# Patient Record
Sex: FEMALE | Race: WHITE | NOT HISPANIC OR LATINO | Employment: OTHER | URBAN - METROPOLITAN AREA
[De-identification: names, ages, dates, MRNs, and addresses within clinical notes are randomized per-mention and may not be internally consistent; named-entity substitution may affect disease eponyms.]

---

## 2017-01-17 ENCOUNTER — NON-PROVIDER VISIT (OUTPATIENT)
Dept: CARDIOLOGY | Facility: MEDICAL CENTER | Age: 67
End: 2017-01-17
Payer: MEDICARE

## 2017-01-17 ENCOUNTER — OFFICE VISIT (OUTPATIENT)
Dept: CARDIOLOGY | Facility: MEDICAL CENTER | Age: 67
End: 2017-01-17
Payer: MEDICARE

## 2017-01-17 VITALS
BODY MASS INDEX: 23.99 KG/M2 | SYSTOLIC BLOOD PRESSURE: 100 MMHG | OXYGEN SATURATION: 93 % | WEIGHT: 144 LBS | DIASTOLIC BLOOD PRESSURE: 60 MMHG | HEART RATE: 62 BPM | HEIGHT: 65 IN

## 2017-01-17 DIAGNOSIS — I48.0 PAF (PAROXYSMAL ATRIAL FIBRILLATION) (HCC): ICD-10-CM

## 2017-01-17 DIAGNOSIS — Z95.0 CARDIAC PACEMAKER IN SITU: ICD-10-CM

## 2017-01-17 DIAGNOSIS — E78.2 MIXED HYPERLIPIDEMIA: ICD-10-CM

## 2017-01-17 DIAGNOSIS — I49.5 SSS (SICK SINUS SYNDROME) (HCC): ICD-10-CM

## 2017-01-17 DIAGNOSIS — G47.00 INSOMNIA, UNSPECIFIED TYPE: ICD-10-CM

## 2017-01-17 PROCEDURE — 93288 INTERROG EVL PM/LDLS PM IP: CPT | Performed by: NURSE PRACTITIONER

## 2017-01-17 PROCEDURE — 99214 OFFICE O/P EST MOD 30 MIN: CPT | Mod: 25 | Performed by: NURSE PRACTITIONER

## 2017-01-17 ASSESSMENT — ENCOUNTER SYMPTOMS
HEADACHES: 0
ABDOMINAL PAIN: 0
MYALGIAS: 0
SHORTNESS OF BREATH: 0
FEVER: 0
LOSS OF CONSCIOUSNESS: 0
PALPITATIONS: 0
CHILLS: 0
DIZZINESS: 0
PND: 0
BRUISES/BLEEDS EASILY: 0
ORTHOPNEA: 0

## 2017-01-17 NOTE — PROGRESS NOTES
Subjective:   Simona Jensen is a 66 y.o. female who presents today for six month FU for PM check and hyperlipidemia.    Simona is a 66 year old female with history of sick sinus syndrome with pacemaker since June 2010, paroxysmal atrial fibrillation on Flecainide, and hyperlipidemia, normally followed by Dr. Malagon.    Since the last visit six months ago, she has been doing well. She joined [employer], and has lost 25+ pounds, and feel much better. No chest pain, pressure or discomfort; no symptomatic palpitations; no shortness of breath, orthopnea or PND; no dizziness or syncope; no edema. BP has been very stable.    Past Medical History   Diagnosis Date   • A-fib (CMS-HCC)    • SSS (sick sinus syndrome) (CMS-HCC)    • Hyperlipidemia    • Menopause    • Pacemaker June 2010   • Other specified disorder of intestines      Divertuculosis     Past Surgical History   Procedure Laterality Date   • Hysterectomy, total abdominal     • Pacemaker insertion  June 2010     [employer] Altrua 60 [id number] implanted by Dr. Marte, [facility].   • Low anterior resection robotic  5/28/2013     Performed by Prashant Almanzar M.D. at SURGERY [facility]   • Breast biopsy  4/28/2014     Performed by Alex Cruz M.D. at SURGERY SAME DAY [facility] ORS     Family History   Problem Relation Age of Onset   • Arrythmia Mother    • Diabetes Mother    • Cancer Mother      breast, lung   • Hypertension Mother    • Hyperlipidemia Mother    • Arrythmia Brother    • Diabetes Brother    • Hypertension Brother    • Cancer Brother      Prsotate CA   • Heart Disease Father    • Cancer Maternal Aunt      breast     History   Smoking status   • Never Smoker    Smokeless tobacco   • Never Used     Allergies   Allergen Reactions   • Nkda [No Known Drug Allergy]      Outpatient Encounter Prescriptions as of 1/17/2017   Medication Sig Dispense Refill   • flecainide (TAMBOCOR) 100 MG Tab Take 1 Tab by mouth 2 times a day. 180  "Tab 3   • simvastatin (ZOCOR) 40 MG Tab Take 1 tablet by mouth  every evening 90 Tab 1   • trazodone (DESYREL) 100 MG Tab Take 1 tablet by mouth  every night at bedtime 90 Tab 1   • aspirin EC (ECOTRIN) 81 MG TBEC Take 81 mg by mouth every day.     • Calcium 500 MG CHEW Take 2 Each by mouth every day.     • MULTIPLE VITAMINS PO Take 1 Tab by mouth every day.     • simvastatin (ZOCOR) 40 MG Tab Take 1 tablet by mouth  every evening 90 Tab 0   • trazodone (DESYREL) 100 MG Tab Take 1 tablet by mouth  every night at bedtime 90 Tab 0     No facility-administered encounter medications on file as of 1/17/2017.     Review of Systems   Constitutional: Negative for fever and chills.   Respiratory: Negative for shortness of breath.    Cardiovascular: Negative for chest pain, palpitations, orthopnea, leg swelling and PND.   Gastrointestinal: Negative for abdominal pain.   Musculoskeletal: Negative for myalgias.   Neurological: Negative for dizziness, loss of consciousness and headaches.   Endo/Heme/Allergies: Does not bruise/bleed easily.        Objective:   /60 mmHg  Pulse 62  Ht 1.651 m (5' 5\")  Wt 65.318 kg (144 lb)  BMI 23.96 kg/m2  SpO2 93%    Physical Exam   Constitutional: She is oriented to person, place, and time. She appears well-developed and well-nourished. No distress.   Weight is down 20 pounds since May 2016.   HENT:   Head: Normocephalic.   Eyes: Conjunctivae and EOM are normal.   Neck: Normal range of motion. Neck supple. No JVD present.   Cardiovascular: Normal rate, regular rhythm, normal heart sounds and intact distal pulses.  Exam reveals no gallop and no friction rub.    No murmur heard.  Pulmonary/Chest: Effort normal and breath sounds normal. No respiratory distress.   PM in left chest wall.   Abdominal: Soft. Bowel sounds are normal.   Musculoskeletal: Normal range of motion. She exhibits no edema.   Neurological: She is alert and oriented to person, place, and time.   Skin: Skin is warm and " dry.   Psychiatric: She has a normal mood and affect. Her behavior is normal.     PM is working normally. No mode switching episodes at all.    Labs as of 8/31/2016 - reviewed with patient:  Vitamin D 60  Glucose 95  BUN 24  Creatinine 0.97  AST 17  ALT 14  GFR 62  Cholesterol 157  Triglycerides 117  HDL 61  LDL 73  Cholesterol/HDL ratio 2.57    Assessment:     1. Cardiac pacemaker in situ     2. SSS (sick sinus syndrome) (CMS-[facility])     3. PAF (paroxysmal atrial fibrillation) (CMS-[facility])     4. Mixed hyperlipidemia     5. Insomnia, unspecified type         Medical Decision Making:  Today's Assessment / Status / Plan:     1. Sick sinus syndrome with paroxysmal atrial fibrillation, with pacemaker, and on Flecainide. She has not had any mode switching, and PM is working normally. No changes are made today.    2. Hyperlipidemia, treated with Zocor. Recent lipids were excellent.    3. Insomnia, treated with Trazadone, stable.    She is doing very well. She is congratulated on her weight loss; continue with exercise.  Same medications. FU in 6 months for next PM check; FU sooner if clinical condition changes.    Collaborating MD: Steven

## 2017-01-17 NOTE — Clinical Note
[facility]   [address]  [place] [address]  Phone: [phone]  Fax: [phone]              Simona Jensen  1950    Encounter Date: 1/17/2017    JUSTIN Matthews          PROGRESS NOTE:  Subjective:   Simona Jensen is a 66 y.o. female who presents today for six month FU for PM check and hyperlipidemia.    Simona is a 66 year old female with history of sick sinus syndrome with pacemaker since June 2010, paroxysmal atrial fibrillation on Flecainide, and hyperlipidemia, normally followed by Dr. Malagon.    Since the last visit six months ago, she has been doing well. She joined [employer], and has lost 25+ pounds, and feel much better. No chest pain, pressure or discomfort; no symptomatic palpitations; no shortness of breath, orthopnea or PND; no dizziness or syncope; no edema. BP has been very stable.    Past Medical History   Diagnosis Date   • A-fib (CMS-HCC)    • SSS (sick sinus syndrome) (CMS-HCC)    • Hyperlipidemia    • Menopause    • Pacemaker June 2010   • Other specified disorder of intestines      Divertuculosis     Past Surgical History   Procedure Laterality Date   • Hysterectomy, total abdominal     • Pacemaker insertion  June 2010     [employer] Altrua 60 [id number] implanted by Dr. Marte, [facility].   • Low anterior resection robotic  5/28/2013     Performed by Prashant Almanzar M.D. at SURGERY [facility]   • Breast biopsy  4/28/2014     Performed by Alex Cruz M.D. at SURGERY SAME DAY [facility] ORS     Family History   Problem Relation Age of Onset   • Arrythmia Mother    • Diabetes Mother    • Cancer Mother      breast, lung   • Hypertension Mother    • Hyperlipidemia Mother    • Arrythmia Brother    • Diabetes Brother    • Hypertension Brother    • Cancer Brother      Prsotate CA   • Heart Disease Father    • Cancer Maternal Aunt      breast     History   Smoking status   "  • Never Smoker    Smokeless tobacco   • Never Used     Allergies   Allergen Reactions   • Nkda [No Known Drug Allergy]      Outpatient Encounter Prescriptions as of 1/17/2017   Medication Sig Dispense Refill   • flecainide (TAMBOCOR) 100 MG Tab Take 1 Tab by mouth 2 times a day. 180 Tab 3   • simvastatin (ZOCOR) 40 MG Tab Take 1 tablet by mouth  every evening 90 Tab 1   • trazodone (DESYREL) 100 MG Tab Take 1 tablet by mouth  every night at bedtime 90 Tab 1   • aspirin EC (ECOTRIN) 81 MG TBEC Take 81 mg by mouth every day.     • Calcium 500 MG CHEW Take 2 Each by mouth every day.     • MULTIPLE VITAMINS PO Take 1 Tab by mouth every day.     • simvastatin (ZOCOR) 40 MG Tab Take 1 tablet by mouth  every evening 90 Tab 0   • trazodone (DESYREL) 100 MG Tab Take 1 tablet by mouth  every night at bedtime 90 Tab 0     No facility-administered encounter medications on file as of 1/17/2017.     Review of Systems   Constitutional: Negative for fever and chills.   Respiratory: Negative for shortness of breath.    Cardiovascular: Negative for chest pain, palpitations, orthopnea, leg swelling and PND.   Gastrointestinal: Negative for abdominal pain.   Musculoskeletal: Negative for myalgias.   Neurological: Negative for dizziness, loss of consciousness and headaches.   Endo/Heme/Allergies: Does not bruise/bleed easily.        Objective:   /60 mmHg  Pulse 62  Ht 1.651 m (5' 5\")  Wt 65.318 kg (144 lb)  BMI 23.96 kg/m2  SpO2 93%    Physical Exam   Constitutional: She is oriented to person, place, and time. She appears well-developed and well-nourished. No distress.   Weight is down 20 pounds since May 2016.   HENT:   Head: Normocephalic.   Eyes: Conjunctivae and EOM are normal.   Neck: Normal range of motion. Neck supple. No JVD present.   Cardiovascular: Normal rate, regular rhythm, normal heart sounds and intact distal pulses.  Exam reveals no gallop and no friction rub.    No murmur heard.  Pulmonary/Chest: Effort " normal and breath sounds normal. No respiratory distress.   PM in left chest wall.   Abdominal: Soft. Bowel sounds are normal.   Musculoskeletal: Normal range of motion. She exhibits no edema.   Neurological: She is alert and oriented to person, place, and time.   Skin: Skin is warm and dry.   Psychiatric: She has a normal mood and affect. Her behavior is normal.     PM is working normally. No mode switching episodes at all.    Labs as of 8/31/2016 - reviewed with patient:  Vitamin D 60  Glucose 95  BUN 24  Creatinine 0.97  AST 17  ALT 14  GFR 62  Cholesterol 157  Triglycerides 117  HDL 61  LDL 73  Cholesterol/HDL ratio 2.57    Assessment:     1. Cardiac pacemaker in situ     2. SSS (sick sinus syndrome) ([facility])     3. PAF (paroxysmal atrial fibrillation) ([facility])     4. Mixed hyperlipidemia     5. Insomnia, unspecified type         Medical Decision Making:  Today's Assessment / Status / Plan:     1. Sick sinus syndrome with paroxysmal atrial fibrillation, with pacemaker, and on Flecainide. She has not had any mode switching, and PM is working normally. No changes are made today.    2. Hyperlipidemia, treated with Zocor. Recent lipids were excellent.    3. Insomnia, treated with Trazadone, stable.    She is doing very well. She is congratulated on her weight loss; continue with exercise.  Same medications. FU in 6 months for next PM check; FU sooner if clinical condition changes.    Collaborating MD: Steven Love

## 2017-01-17 NOTE — MR AVS SNAPSHOT
"        Simona Jensen   2017 1:45 PM   Office Visit   MRN: [id number]    Department:  [facility]   Dept Phone:  [phone]    Description:  Female : 1950   Provider:  WALE MatthewsPSG           Reason for Visit     Follow-Up SICK SINUS SYNDROME    Pacemaker Check/Dysfunction [employer]      Allergies as of 2017     Allergen Noted Reactions    Nkda [No Known Drug Allergy] 2012         Vital Signs     Blood Pressure Pulse Height Weight Body Mass Index Oxygen Saturation    100/60 mmHg 62 1.651 m (5' 5\") 65.318 kg (144 lb) 23.96 kg/m2 93%    Smoking Status                   Never Smoker            Basic Information     Date Of Birth Sex Race Ethnicity Preferred Language    1950 Female White Non- English      Your appointments     2017 12:40 PM   PACER CHECK ONLY with Veronica Britton A.P.N.   [facility] (--)    [address]  [facility] [id number] [phone]            2017  1:00 PM   FOLLOW UP with Jonathan Malagon M.D.   [facility] (--)    [address]  [facility] [id number] [phone]              Problem List              ICD-10-CM Priority Class Noted - Resolved    Cardiac pacemaker in situ Z95.0   2012 - Present    SSS (sick sinus syndrome) (CMS-HCC) I49.5   2012 - Present    Diverticulitis K57.92   2013 - Present    PAF (paroxysmal atrial fibrillation) (CMS-HCC) I48.0   2013 - Present    Hyperlipidemia E78.5   2014 - Present    Insomnia G47.00   2014 - Present    Other (abnormal) findings on radiological examination of breast R92.8   2014 - Present    Vitamin D insufficiency E55.9   2015 - Present    Osteopenia M85.80   2015 - Present    Family history of breast cancer in mother Z80.3   2015 - Present    Hypercalcemia E83.52   2016 - Present   "   Health Maintenance        Date Due Completion Dates    IMM DTaP/Tdap/Td Vaccine (1 - Tdap) 9/19/1970 ---    IMM INFLUENZA (1) 9/1/2016 10/28/2015    IMM PNEUMOCOCCAL 65+ (ADULT) LOW/MEDIUM RISK SERIES (1 of 2 - PCV13) 9/19/2016 ---    MAMMOGRAM 12/28/2017 12/28/2016, 12/8/2015, 12/4/2014, 4/28/2014, 4/28/2014, 12/16/2013, 12/10/2013, 12/10/2013, 12/10/2013, 12/9/2013, 12/15/2012 (Prv Comp)    Override on 12/15/2012: Previously completed    COLONOSCOPY 10/15/2019 10/15/2009 (Prv Comp)    Override on 10/15/2009: Previously completed    BONE DENSITY 11/9/2021 11/9/2016            Current Immunizations     Influenza TIV (IM) 10/28/2015    SHINGLES VACCINE 10/15/2012      Below and/or attached are the medications your provider expects you to take. Review all of your home medications and newly ordered medications with your provider and/or pharmacist. Follow medication instructions as directed by your provider and/or pharmacist. Please keep your medication list with you and share with your provider. Update the information when medications are discontinued, doses are changed, or new medications (including over-the-counter products) are added; and carry medication information at all times in the event of emergency situations     Allergies:  NKDA - (reactions not documented)               Medications  Valid as of: January 17, 2017 -  1:46 PM    Generic Name Brand Name Tablet Size Instructions for use    Aspirin (Tablet Delayed Response) ECOTRIN 81 MG Take 81 mg by mouth every day.        Calcium Carbonate (Chew Tab) Calcium 500 MG Take 2 Each by mouth every day.        Flecainide Acetate (Tab) TAMBOCOR 100 MG Take 1 Tab by mouth 2 times a day.        Multiple Vitamin   Take 1 Tab by mouth every day.        Simvastatin (Tab) ZOCOR 40 MG Take 1 tablet by mouth  every evening        Simvastatin (Tab) ZOCOR 40 MG Take 1 tablet by mouth  every evening        TraZODone HCl (Tab) DESYREL 100 MG Take 1 tablet by mouth  every night  at bedtime        TraZODone HCl (Tab) DESYREL 100 MG Take 1 tablet by mouth  every night at bedtime        .                 Medicines prescribed today were sent to:     [facility] (), [place] - [address]    [address] () [place] [address]    Phone: [phone] Fax: [phone]    Open 24 Hours?: No    [facility] - [place], [place] - [address] [facility]    [address] [facility] [address]    Phone: [phone] Fax: [phone]    Open 24 Hours?: No    [facility] - [place] [id number] [facility] [address] [facility] [address]    Phone: [phone] Fax: [phone]    Open 24 Hours?: No      Medication refill instructions:       If your prescription bottle indicates you have medication refills left, it is not necessary to call your provider’s office. Please contact your pharmacy and they will refill your medication.    If your prescription bottle indicates you do not have any refills left, you may request refills at any time through one of the following ways: The online [employer] system (except Urgent Care), by calling your provider’s office, or by asking your pharmacy to contact your provider’s office with a refill request. Medication refills are processed only during regular business hours and may not be available until the next business day. Your provider may request additional information or to have a follow-up visit with you prior to refilling your medication.   *Please Note: Medication refills are assigned a new Rx number when refilled electronically. Your pharmacy may indicate that no refills were authorized even though a new prescription for the same medication is available at the pharmacy. Please request the medicine by name with the pharmacy before contacting your provider for a refill.           [employer] Access Code: Activation code not generated  Current [employer] Status: Active

## 2017-05-02 ENCOUNTER — TELEPHONE (OUTPATIENT)
Dept: MEDICAL GROUP | Facility: PHYSICIAN GROUP | Age: 67
End: 2017-05-02

## 2017-05-02 NOTE — TELEPHONE ENCOUNTER
NEW PATIENT VISIT PRE-VISIT PLANNING    1.  [employer] Patient is checked in Patient Demographics? YES    2.  Immunizations were updated in Epic using WebIZ?: Epic matches WebIZ       •  Web Iz Recommendations: HEPATITIS A  HEPATITIS B TDAP    3.  Patient is due for the following Health Maintenance Topics:   Health Maintenance Due   Topic Date Due   • Annual Wellness Visit  1950   • IMM DTaP/Tdap/Td Vaccine (1 - Tdap) 09/19/1970       - Patient declines Immunizations: TDAP     4.  Reviewed/Updated the following with patient:       •   Preferred Pharmacy? YES       •   Preferred Lab? YES       •   Medications? YES. Was Abstract Encounter opened and chart updated? YES       •   Social History? YES. Was Abstract Encounter opened and chart updated? YES       •   Family History? YES. Was Abstract Encounter opened and chart updated? YES    5.  Updated Care Team?       •   DME Company (gait device, O2, CPAP, etc.) NO       •   Other Specialists (eye doctor, derm, GYN, cardiology, endo, etc): NO    6.  Patient was informed to arrive 15 min prior to their scheduled appointment and bring in their medication bottles? YES

## 2017-05-03 ENCOUNTER — OFFICE VISIT (OUTPATIENT)
Dept: MEDICAL GROUP | Facility: PHYSICIAN GROUP | Age: 67
End: 2017-05-03
Payer: MEDICARE

## 2017-05-03 ENCOUNTER — HOSPITAL ENCOUNTER (OUTPATIENT)
Dept: LAB | Facility: MEDICAL CENTER | Age: 67
End: 2017-05-03
Attending: INTERNAL MEDICINE
Payer: MEDICARE

## 2017-05-03 VITALS
HEART RATE: 63 BPM | BODY MASS INDEX: 23.74 KG/M2 | OXYGEN SATURATION: 94 % | HEIGHT: 66 IN | TEMPERATURE: 98.8 F | SYSTOLIC BLOOD PRESSURE: 100 MMHG | WEIGHT: 147.71 LBS | DIASTOLIC BLOOD PRESSURE: 80 MMHG

## 2017-05-03 DIAGNOSIS — G47.00 INSOMNIA, UNSPECIFIED TYPE: ICD-10-CM

## 2017-05-03 DIAGNOSIS — I48.0 PAF (PAROXYSMAL ATRIAL FIBRILLATION) (HCC): ICD-10-CM

## 2017-05-03 DIAGNOSIS — E55.9 VITAMIN D INSUFFICIENCY: ICD-10-CM

## 2017-05-03 DIAGNOSIS — E78.2 MIXED HYPERLIPIDEMIA: ICD-10-CM

## 2017-05-03 DIAGNOSIS — Z80.3 FAMILY HISTORY OF BREAST CANCER IN MOTHER: ICD-10-CM

## 2017-05-03 DIAGNOSIS — I49.5 SSS (SICK SINUS SYNDROME) (HCC): ICD-10-CM

## 2017-05-03 DIAGNOSIS — K57.30 DIVERTICULOSIS OF LARGE INTESTINE WITHOUT HEMORRHAGE: ICD-10-CM

## 2017-05-03 DIAGNOSIS — M85.859 OSTEOPENIA OF THIGH, UNSPECIFIED LATERALITY: ICD-10-CM

## 2017-05-03 DIAGNOSIS — Z95.0 CARDIAC PACEMAKER IN SITU: ICD-10-CM

## 2017-05-03 LAB
ALBUMIN SERPL BCP-MCNC: 4.5 G/DL (ref 3.2–4.9)
ALBUMIN/GLOB SERPL: 1.4 G/DL
ALP SERPL-CCNC: 72 U/L (ref 30–99)
ALT SERPL-CCNC: 16 U/L (ref 2–50)
ANION GAP SERPL CALC-SCNC: 9 MMOL/L (ref 0–11.9)
AST SERPL-CCNC: 16 U/L (ref 12–45)
BILIRUB SERPL-MCNC: 0.6 MG/DL (ref 0.1–1.5)
BUN SERPL-MCNC: 25 MG/DL (ref 8–22)
CALCIUM SERPL-MCNC: 10.2 MG/DL (ref 8.5–10.5)
CHLORIDE SERPL-SCNC: 104 MMOL/L (ref 96–112)
CHOLEST SERPL-MCNC: 170 MG/DL (ref 100–199)
CO2 SERPL-SCNC: 26 MMOL/L (ref 20–33)
CREAT SERPL-MCNC: 0.94 MG/DL (ref 0.5–1.4)
GFR SERPL CREATININE-BSD FRML MDRD: 60 ML/MIN/1.73 M 2
GLOBULIN SER CALC-MCNC: 3.2 G/DL (ref 1.9–3.5)
GLUCOSE SERPL-MCNC: 84 MG/DL (ref 65–99)
HDLC SERPL-MCNC: 72 MG/DL
LDLC SERPL CALC-MCNC: 73 MG/DL
POTASSIUM SERPL-SCNC: 4 MMOL/L (ref 3.6–5.5)
PROT SERPL-MCNC: 7.7 G/DL (ref 6–8.2)
SODIUM SERPL-SCNC: 139 MMOL/L (ref 135–145)
TRIGL SERPL-MCNC: 126 MG/DL (ref 0–149)

## 2017-05-03 PROCEDURE — 99214 OFFICE O/P EST MOD 30 MIN: CPT | Performed by: INTERNAL MEDICINE

## 2017-05-03 PROCEDURE — G8432 DEP SCR NOT DOC, RNG: HCPCS | Performed by: INTERNAL MEDICINE

## 2017-05-03 PROCEDURE — 4040F PNEUMOC VAC/ADMIN/RCVD: CPT | Performed by: INTERNAL MEDICINE

## 2017-05-03 PROCEDURE — G8420 CALC BMI NORM PARAMETERS: HCPCS | Performed by: INTERNAL MEDICINE

## 2017-05-03 PROCEDURE — 82306 VITAMIN D 25 HYDROXY: CPT

## 2017-05-03 PROCEDURE — 1036F TOBACCO NON-USER: CPT | Performed by: INTERNAL MEDICINE

## 2017-05-03 PROCEDURE — 1101F PT FALLS ASSESS-DOCD LE1/YR: CPT | Performed by: INTERNAL MEDICINE

## 2017-05-03 PROCEDURE — 36415 COLL VENOUS BLD VENIPUNCTURE: CPT

## 2017-05-03 PROCEDURE — 3014F SCREEN MAMMO DOC REV: CPT | Performed by: INTERNAL MEDICINE

## 2017-05-03 PROCEDURE — 80061 LIPID PANEL: CPT

## 2017-05-03 PROCEDURE — 80053 COMPREHEN METABOLIC PANEL: CPT

## 2017-05-03 ASSESSMENT — PATIENT HEALTH QUESTIONNAIRE - PHQ9: CLINICAL INTERPRETATION OF PHQ2 SCORE: 0

## 2017-05-03 NOTE — ASSESSMENT & PLAN NOTE
History of low calcium and vitamin D. She is on supplement 1000 U per day. Last vitamin D 60 (09/2016) she denies any bone pain, tremor,

## 2017-05-03 NOTE — PROGRESS NOTES
Subjective:   iSmona Jensen is a 66 y.o. female here today for establish, medication refills, hyperlipidemia    PAF (paroxysmal atrial fibrillation)  She had history of a-fib since 2010. She was placed on flecanaide since then. She sees Dr. Call who is close monitoring her. At that time she noted feeling fatgued, HR 40. She was dx with SSS and pacemaker was placed. She is still taking flecainide. He denies chest pain, leg swelling, palpitation, shortness of breath.    Cardiac pacemaker in situ  Follows with cardiology every 6 months    Diverticulosis of large intestine without hemorrhage  History of complicated diverticulitis status post colon ectomy. Stable. Last colonoscopy 2009. She denies any melanotic stool, hematochezia or abdominal pain.    Family history of breast cancer in mother  Mother and aunt with breast cancer. She has had left breast biopsy 2014 , benign microcalcification.      Hyperlipidemia  Chronic, well managed. LDL < 100. She is on simvastatin 40 mg daily     Insomnia  Chronic. She has trouble falling and staying in sleep. Trazodone helps. She takes it every night. She denies apneic episodes     SSS (sick sinus syndrome)  As per above     Vitamin D insufficiency  History of low calcium and vitamin D. She is on supplement 1000 U per day. Last vitamin D 60 (09/2016) she denies any bone pain, tremor,         Current medicines (including changes today)  Current Outpatient Prescriptions   Medication Sig Dispense Refill   • Cholecalciferol (VITAMIN D-3 PO) Take  by mouth.     • Probiotic Product (PROBIOTIC DAILY PO) Take  by mouth.     • flecainide (TAMBOCOR) 100 MG Tab Take 1 Tab by mouth 2 times a day. 180 Tab 3   • simvastatin (ZOCOR) 40 MG Tab Take 1 tablet by mouth  every evening 90 Tab 1   • trazodone (DESYREL) 100 MG Tab Take 1 tablet by mouth  every night at bedtime 90 Tab 1   • aspirin EC (ECOTRIN) 81 MG TBEC Take 81 mg by mouth every day.     • MULTIPLE VITAMINS PO Take 1 Tab by  mouth every day.       No current facility-administered medications for this visit.     She  has a past medical history of A-fib (CMS-HCC); SSS (sick sinus syndrome) (CMS-HCC); Hyperlipidemia; Menopause; Pacemaker (June 2010); Other specified disorder of intestines; and Cataract. She also has no past medical history of Breast cancer (CMS-HCC).    Current Outpatient Prescriptions   Medication Sig Dispense Refill   • Cholecalciferol (VITAMIN D-3 PO) Take  by mouth.     • Probiotic Product (PROBIOTIC DAILY PO) Take  by mouth.     • flecainide (TAMBOCOR) 100 MG Tab Take 1 Tab by mouth 2 times a day. 180 Tab 3   • simvastatin (ZOCOR) 40 MG Tab Take 1 tablet by mouth  every evening 90 Tab 1   • trazodone (DESYREL) 100 MG Tab Take 1 tablet by mouth  every night at bedtime 90 Tab 1   • aspirin EC (ECOTRIN) 81 MG TBEC Take 81 mg by mouth every day.     • MULTIPLE VITAMINS PO Take 1 Tab by mouth every day.       No current facility-administered medications for this visit.       Allergies as of 05/03/2017 - Graham as Reviewed 05/03/2017   Allergen Reaction Noted   • Nkda [no known drug allergy]  07/20/2012       Social History     Social History   • Marital Status:      Spouse Name: N/A   • Number of Children: N/A   • Years of Education: N/A     Occupational History   • Not on file.     Social History Main Topics   • Smoking status: Never Smoker    • Smokeless tobacco: Never Used   • Alcohol Use: 0.5 oz/week     1 Glasses of wine per week      Comment: social, 1 per week   • Drug Use: No   • Sexual Activity:     Partners: Male     Other Topics Concern   • Not on file     Social History Narrative        Family History   Problem Relation Age of Onset   • Arrythmia Mother    • Diabetes Mother    • Cancer Mother      breast, lung   • Hypertension Mother    • Hyperlipidemia Mother    • Arrythmia Brother    • Diabetes Brother    • Hypertension Brother    • Cancer Brother      Prsotate CA   • Heart Disease Brother    • Heart  "Disease Father    • Cancer Maternal Aunt      breast       Past Surgical History   Procedure Laterality Date   • Hysterectomy, total abdominal     • Pacemaker insertion  June 2010     [employer] 60 [id number] implanted by Dr. Marte, [facility].   • Low anterior resection robotic  5/28/2013     Performed by Prashant Almanzar M.D. at SURGERY [facility]   • Breast biopsy  4/28/2014     Performed by Alex Cruz M.D. at SURGERY SAME DAY [facility] ORS   • Pacemaker insertion     • Abdominal hysterectomy total       still has ovaries       ROS   No chest pain, no shortness of breath, no abdominal pain       Objective:     Blood pressure 100/80, pulse 63, temperature 37.1 °C (98.8 °F), height 1.676 m (5' 6\"), weight 67 kg (147 lb 11.3 oz), SpO2 94 %. Body mass index is 23.85 kg/(m^2).   Physical Exam:  Constitutional: Alert, no distress.  Skin: Warm, dry, good turgor, no rashes in visible areas.  Eye: Equal, round and reactive, conjunctiva clear, lids normal.  ENMT: Lips without lesions, good dentition, oropharynx clear.  Neck: Trachea midline, no masses, no thyromegaly. No cervical or supraclavicular lymphadenopathy  Respiratory: Unlabored respiratory effort, lungs clear to auscultation, no wheezes, no ronchi.  Cardiovascular: Normal S1, S2, no murmur, no edema.  Abdomen: Soft, non-tender, no masses, no hepatosplenomegaly.  Psych: Alert and oriented x3, normal affect and mood.        Assessment and Plan:   The following treatment plan was discussed    1. SSS (sick sinus syndrome) (CMS-HCC)  2. PAF (paroxysmal atrial fibrillation) (CMS-HCC)  3. Cardiac pacemaker in situ  Stable, chronic. Follow up with Dr. Torres.   - COMP METABOLIC PANEL; Future      4. Diverticulosis of large intestine without hemorrhage  [place], no flares     5. Family history of breast cancer in mother  She is up to date with mammogram     6. Insomnia, unspecified type  Continue trazodone     7. Mixed hyperlipidemia  LDL at " the goal.continue simvastatin   - LIPID PROFILE; Future  - COMP METABOLIC PANEL; Future    8. Osteopenia of thigh, unspecified laterality  Bone densitty 2016, normal, no osteopenia. Repeat DEXA scan 2021     9. Vitamin D insufficiency  Level nl 09/2016. Continue to monitor. Continue supplement   - VITAMIN D,25 HYDROXY; Future    Followup: Return in about 3 months (around 8/3/2017).

## 2017-05-03 NOTE — ASSESSMENT & PLAN NOTE
She had history of a-fib since 2010. She was placed on flecanaide since then. She sees Dr. Call who is close monitoring her. At that time she noted feeling fatgued, HR 40. She was dx with SSS and pacemaker was placed. She is still taking flecainide. He denies chest pain, leg swelling, palpitation, shortness of breath.

## 2017-05-03 NOTE — ASSESSMENT & PLAN NOTE
History of complicated diverticulitis status post colon ectomy. Stable. Last colonoscopy 2009. She denies any melanotic stool, hematochezia or abdominal pain.

## 2017-05-03 NOTE — ASSESSMENT & PLAN NOTE
Chronic. She has trouble falling and staying in sleep. Trazodone helps. She takes it every night. She denies apneic episodes

## 2017-05-03 NOTE — MR AVS SNAPSHOT
"        Simona Jensen   5/3/2017 1:40 PM   Office Visit   MRN: [id number]    Department:  [facility]   Dept Phone:  [phone]    Description:  Female : 1950   Provider:  Juan Sanchez M.D.           Reason for Visit     Medication Refill needs refills    Other est care      Allergies as of 5/3/2017     Allergen Noted Reactions    Nkda [No Known Drug Allergy] 2012         You were diagnosed with     SSS (sick sinus syndrome) (CMS-[facility])   [962110]       PAF (paroxysmal atrial fibrillation) (CMS-[facility])   [333909]       Cardiac pacemaker in situ   [V45.01.ICD-9-CM]       Diverticulosis of large intestine without hemorrhage   [1148241]       Family history of breast cancer in mother   [308187]       Insomnia, unspecified type   [0722215]       Mixed hyperlipidemia   [272.2.ICD-9-CM]       Osteopenia of thigh, unspecified laterality   [2260542]       Vitamin D insufficiency   [926134]         Vital Signs     Blood Pressure Pulse Temperature Height Weight Body Mass Index    100/80 mmHg 63 37.1 °C (98.8 °F) 1.676 m (5' 6\") 67 kg (147 lb 11.3 oz) 23.85 kg/m2    Oxygen Saturation Smoking Status                94% Never Smoker           Basic Information     Date Of Birth Sex Race Ethnicity Preferred Language    1950 Female White Non- English      Your appointments     2017 12:40 PM   PACER CHECK ONLY with WALE MatthewsPSG   [facility] (--)    [address]  [address]  [place] [address] [phone]            2017  1:00 PM   FOLLOW UP with Jonathan Malagon M.D.   [facility] (--)    [address]  [address]  [place] [address] [phone]              Problem List              ICD-10-CM Priority Class Noted - Resolved    Cardiac pacemaker in situ Z95.0   2012 - Present    SSS (sick sinus syndrome) (CMS-[facility]) I49.5   2012 - Present  "    Diverticulosis of large intestine without hemorrhage K57.30   4/11/2013 - Present    PAF (paroxysmal atrial fibrillation) (CMS-HCC) I48.0   8/14/2013 - Present    Hyperlipidemia E78.5   2/25/2014 - Present    Insomnia G47.00   2/25/2014 - Present    Vitamin D insufficiency E55.9   12/8/2015 - Present    Family history of breast cancer in mother Z80.3   12/8/2015 - Present      Health Maintenance        Date Due Completion Dates    IMM PNEUMOCOCCAL 65+ (ADULT) LOW/MEDIUM RISK SERIES (2 of 2 - PPSV23) 11/9/2017 11/9/2016    MAMMOGRAM 12/28/2017 12/28/2016, 12/8/2015, 12/4/2014, 12/16/2013, 12/9/2013, 12/15/2012 (Prv Comp)    Override on 12/15/2012: Previously completed    COLONOSCOPY 10/15/2019 10/15/2009 (Prv Comp)    Override on 10/15/2009: Previously completed    IMM DTaP/Tdap/Td Vaccine (2 - Td) 1/10/2021 1/10/2011    BONE DENSITY 11/9/2021 11/9/2016            Current Immunizations     13-VALENT PCV PREVNAR 11/9/2016    Dtap Vaccine 1/10/2011    Influenza TIV (IM) 10/28/2015    Influenza Vac Subunit Quad Inj (Pf) 10/15/2012    Influenza Vaccine Adult HD 9/11/2016    Influenza Vaccine Quad Inj (Preserved) 10/18/2014, 10/15/2013    SHINGLES VACCINE 10/15/2012      Below and/or attached are the medications your provider expects you to take. Review all of your home medications and newly ordered medications with your provider and/or pharmacist. Follow medication instructions as directed by your provider and/or pharmacist. Please keep your medication list with you and share with your provider. Update the information when medications are discontinued, doses are changed, or new medications (including over-the-counter products) are added; and carry medication information at all times in the event of emergency situations     Allergies:  NKDA - (reactions not documented)               Medications  Valid as of: May 03, 2017 -  2:26 PM    Generic Name Brand Name Tablet Size Instructions for use    Aspirin (Tablet Delayed  Response) ECOTRIN 81 MG Take 81 mg by mouth every day.        Cholecalciferol   Take  by mouth.        Flecainide Acetate (Tab) TAMBOCOR 100 MG Take 1 Tab by mouth 2 times a day.        Multiple Vitamin   Take 1 Tab by mouth every day.        Probiotic Product   Take  by mouth.        Simvastatin (Tab) ZOCOR 40 MG Take 1 tablet by mouth  every evening        TraZODone HCl (Tab) DESYREL 100 MG Take 1 tablet by mouth  every night at bedtime        .                 Medicines prescribed today were sent to:     [facility] - [place], [place] - [address]    [address] [address]    Phone: [phone] Fax: [phone]    Open 24 Hours?: No      Medication refill instructions:       If your prescription bottle indicates you have medication refills left, it is not necessary to call your provider’s office. Please contact your pharmacy and they will refill your medication.    If your prescription bottle indicates you do not have any refills left, you may request refills at any time through one of the following ways: The online [employer] system (except Urgent Care), by calling your provider’s office, or by asking your pharmacy to contact your provider’s office with a refill request. Medication refills are processed only during regular business hours and may not be available until the next business day. Your provider may request additional information or to have a follow-up visit with you prior to refilling your medication.   *Please Note: Medication refills are assigned a new Rx number when refilled electronically. Your pharmacy may indicate that no refills were authorized even though a new prescription for the same medication is available at the pharmacy. Please request the medicine by name with the pharmacy before contacting your provider for a refill.        Your To Do List     Future Labs/Procedures Complete By Expires    COMP METABOLIC PANEL  As directed 5/4/2018    LIPID PROFILE   As directed 5/4/2018    VITAMIN D,25 HYDROXY  As directed 5/4/2018         MyChart Access Code: Activation code not generated  Current [employer] Status: Active

## 2017-05-03 NOTE — ASSESSMENT & PLAN NOTE
Mother and aunt with breast cancer. She has had left breast biopsy 2014 , benign microcalcification.

## 2017-05-04 LAB — 25(OH)D3 SERPL-MCNC: 40 NG/ML (ref 30–100)

## 2017-05-04 RX ORDER — ACETAMINOPHEN 160 MG
TABLET,DISINTEGRATING ORAL
COMMUNITY
End: 2018-12-20 | Stop reason: SDUPTHER

## 2017-05-04 NOTE — PROGRESS NOTES
Quick Note:    Renetta Jarvis,    I just wanted to let you know that that blood sugar, kidney function, liver function, vitamin D are normal.     Best,   Juan Sanchez M.D.  ______

## 2017-05-11 DIAGNOSIS — G47.00 INSOMNIA, UNSPECIFIED TYPE: ICD-10-CM

## 2017-05-11 RX ORDER — SIMVASTATIN 40 MG
TABLET ORAL
Qty: 90 TAB | Refills: 0 | Status: SHIPPED | OUTPATIENT
Start: 2017-05-11 | End: 2017-07-19 | Stop reason: SDUPTHER

## 2017-05-11 RX ORDER — TRAZODONE HYDROCHLORIDE 100 MG/1
TABLET ORAL
Qty: 90 TAB | Refills: 0 | Status: SHIPPED | OUTPATIENT
Start: 2017-05-11 | End: 2017-05-22 | Stop reason: SDUPTHER

## 2017-05-19 ENCOUNTER — PATIENT MESSAGE (OUTPATIENT)
Dept: MEDICAL GROUP | Facility: PHYSICIAN GROUP | Age: 67
End: 2017-05-19

## 2017-05-19 DIAGNOSIS — L98.9 SKIN LESION: ICD-10-CM

## 2017-05-22 DIAGNOSIS — G47.00 INSOMNIA, UNSPECIFIED TYPE: ICD-10-CM

## 2017-05-22 RX ORDER — TRAZODONE HYDROCHLORIDE 100 MG/1
TABLET ORAL
Qty: 90 TAB | Refills: 1 | OUTPATIENT
Start: 2017-05-22

## 2017-05-22 NOTE — TELEPHONE ENCOUNTER
Was the patient seen in the last year in this department? Yes     Does patient have an active prescription for medications requested? No     Received Request Via: Pharmacy     Last visit:5/3/17    Last labs:5/3/17

## 2017-05-23 RX ORDER — TRAZODONE HYDROCHLORIDE 100 MG/1
TABLET ORAL
Qty: 90 TAB | Refills: 0 | Status: SHIPPED | OUTPATIENT
Start: 2017-05-23 | End: 2017-09-15 | Stop reason: SDUPTHER

## 2017-05-31 ENCOUNTER — TELEPHONE (OUTPATIENT)
Dept: MEDICAL GROUP | Facility: PHYSICIAN GROUP | Age: 67
End: 2017-05-31

## 2017-05-31 DIAGNOSIS — L98.9 SKIN LESION: ICD-10-CM

## 2017-05-31 NOTE — TELEPHONE ENCOUNTER
1. Caller Name: Simona Jensen                      Call Back Number: [phone] (home)     2. Message: Pt needs an updated referral to dermatology please advise.     3. Patient approves office to leave a detailed voicemail/MyChart message: N\A

## 2017-05-31 NOTE — TELEPHONE ENCOUNTER
----- Message from Your Healthcare Team sent at 5/31/2017  2:23 PM PDT -----  Regarding: Non-Urgent Medical Question  Contact: [phone]  Renetta Sanchez,    Per your referral, I contacted [facility] and, unfortunately, they do not accept Medicare patients.   I wondered if you had another recommendation.     Thank you,  Simona Moody

## 2017-07-11 ENCOUNTER — OFFICE VISIT (OUTPATIENT)
Dept: CARDIOLOGY | Facility: MEDICAL CENTER | Age: 67
End: 2017-07-11
Payer: MEDICARE

## 2017-07-11 ENCOUNTER — NON-PROVIDER VISIT (OUTPATIENT)
Dept: CARDIOLOGY | Facility: MEDICAL CENTER | Age: 67
End: 2017-07-11
Payer: MEDICARE

## 2017-07-11 VITALS
BODY MASS INDEX: 24.99 KG/M2 | HEIGHT: 65 IN | OXYGEN SATURATION: 92 % | SYSTOLIC BLOOD PRESSURE: 110 MMHG | HEART RATE: 60 BPM | WEIGHT: 150 LBS | DIASTOLIC BLOOD PRESSURE: 70 MMHG

## 2017-07-11 DIAGNOSIS — I49.5 SSS (SICK SINUS SYNDROME) (HCC): ICD-10-CM

## 2017-07-11 DIAGNOSIS — E78.2 MIXED HYPERLIPIDEMIA: ICD-10-CM

## 2017-07-11 DIAGNOSIS — Z95.0 CARDIAC PACEMAKER IN SITU: ICD-10-CM

## 2017-07-11 DIAGNOSIS — I48.0 PAF (PAROXYSMAL ATRIAL FIBRILLATION) (HCC): ICD-10-CM

## 2017-07-11 PROCEDURE — 93288 INTERROG EVL PM/LDLS PM IP: CPT | Performed by: NURSE PRACTITIONER

## 2017-07-11 PROCEDURE — 99213 OFFICE O/P EST LOW 20 MIN: CPT | Performed by: INTERNAL MEDICINE

## 2017-07-11 ASSESSMENT — ENCOUNTER SYMPTOMS
EYE DISCHARGE: 0
CHILLS: 0
FEVER: 0
BLURRED VISION: 0
MYALGIAS: 0
COUGH: 0
HEADACHES: 0
SHORTNESS OF BREATH: 0
PND: 0
HEARTBURN: 0
DIZZINESS: 0
PALPITATIONS: 0
NAUSEA: 0
BRUISES/BLEEDS EASILY: 0
NERVOUS/ANXIOUS: 0
DEPRESSION: 0

## 2017-07-11 NOTE — PROGRESS NOTES
Device is working normally. No mode switching episodes.  Normal sensing and capture of RA and RV leads; stable impedances. Battery longevity is 2 years.  No changes are made today.    FU in 6 months for next PM check with me. She does see Dr. Malagon today too.    Collaborating MD: Manas

## 2017-07-11 NOTE — MR AVS SNAPSHOT
Simona Jensen   2017 12:40 PM   Non-Provider Visit   MRN: [id number]    Department:  [facility]   Dept Phone:  [phone]    Description:  Female : 1950   Provider:  JUSTIN Matthews           Allergies as of 2017     Allergen Noted Reactions    Nkda [No Known Drug Allergy] 2012         You were diagnosed with     Cardiac pacemaker in situ   [V45.01.ICD-9-CM]       SSS (sick sinus syndrome) (CMS-[facility])   [852289]       PAF (paroxysmal atrial fibrillation) (CMS-[facility])   [593644]         Vital Signs     Smoking Status                   Never Smoker            Basic Information     Date Of Birth Sex Race Ethnicity Preferred Language    1950 Female White Non- English      Your appointments     Sep 11, 2017  1:40 PM   Established Patient with Juan Sanchez M.D.   [facility] (--)    [address]  [address]  [facility] [id number] [phone]           You will be receiving a confirmation call a few days before your appointment from our automated call confirmation system.              Problem List              ICD-10-CM Priority Class Noted - Resolved    Cardiac pacemaker in situ Z95.0   2012 - Present    SSS (sick sinus syndrome) (CMS-[facility]) I49.5   2012 - Present    Diverticulosis of large intestine without hemorrhage K57.30   2013 - Present    PAF (paroxysmal atrial fibrillation) (CMS-[facility]) I48.0   2013 - Present    Hyperlipidemia E78.5   2014 - Present    Insomnia G47.00   2014 - Present    Vitamin D insufficiency E55.9   2015 - Present    Family history of breast cancer in mother Z80.3   2015 - Present      Health Maintenance        Date Due Completion Dates    IMM INFLUENZA (1) 2017, 10/28/2015, 10/18/2014, 10/15/2013, 10/15/2012    IMM PNEUMOCOCCAL 65+ (ADULT) LOW/MEDIUM RISK SERIES (2 of 2 - PPSV23) 2017    MAMMOGRAM 2017, 2015, 2014, 2013,  12/9/2013, 12/15/2012 (Prv Comp)    Override on 12/15/2012: Previously completed    COLONOSCOPY 10/15/2019 10/15/2009 (Prv Comp)    Override on 10/15/2009: Previously completed    IMM DTaP/Tdap/Td Vaccine (2 - Td) 1/10/2021 1/10/2011    BONE DENSITY 11/9/2021 11/9/2016            Current Immunizations     13-VALENT PCV PREVNAR 11/9/2016    Dtap Vaccine 1/10/2011    Influenza TIV (IM) 10/28/2015    Influenza Vac Subunit Quad Inj (Pf) 10/15/2012    Influenza Vaccine Adult HD 9/11/2016    Influenza Vaccine Quad Inj (Preserved) 10/18/2014, 10/15/2013    SHINGLES VACCINE 10/15/2012      Below and/or attached are the medications your provider expects you to take. Review all of your home medications and newly ordered medications with your provider and/or pharmacist. Follow medication instructions as directed by your provider and/or pharmacist. Please keep your medication list with you and share with your provider. Update the information when medications are discontinued, doses are changed, or new medications (including over-the-counter products) are added; and carry medication information at all times in the event of emergency situations     Allergies:  NKDA - (reactions not documented)               Medications  Valid as of: July 11, 2017 -  1:05 PM    Generic Name Brand Name Tablet Size Instructions for use    Aspirin (Tablet Delayed Response) ECOTRIN 81 MG Take 81 mg by mouth every day.        Cholecalciferol   Take  by mouth.        Cholecalciferol (Cap) Vitamin D3 2000 UNIT Take  by mouth.        Flecainide Acetate (Tab) TAMBOCOR 100 MG Take 1 Tab by mouth 2 times a day.        Multiple Vitamin   Take 1 Tab by mouth every day.        Probiotic Product   Take  by mouth.        Simvastatin (Tab) ZOCOR 40 MG Take 1 tablet by mouth  every evening        TraZODone HCl (Tab) DESYREL 100 MG Take 1 tablet by mouth  every night at bedtime        .                 Medicines prescribed today were sent to:     [facility]  MAIL DELIVERY - [place], [place] - [address]    [address] [address]    Phone: [phone] Fax: [phone]    Open 24 Hours?: No      Medication refill instructions:       If your prescription bottle indicates you have medication refills left, it is not necessary to call your provider’s office. Please contact your pharmacy and they will refill your medication.    If your prescription bottle indicates you do not have any refills left, you may request refills at any time through one of the following ways: The online [email or web address] system (except Urgent Care), by calling your provider’s office, or by asking your pharmacy to contact your provider’s office with a refill request. Medication refills are processed only during regular business hours and may not be available until the next business day. Your provider may request additional information or to have a follow-up visit with you prior to refilling your medication.   *Please Note: Medication refills are assigned a new Rx number when refilled electronically. Your pharmacy may indicate that no refills were authorized even though a new prescription for the same medication is available at the pharmacy. Please request the medicine by name with the pharmacy before contacting your provider for a refill.           [email or web address] Access Code: Activation code not generated  Current [employer] Status: Active

## 2017-07-11 NOTE — Clinical Note
[facility]   [address]   [address]  [place] [address]  Phone: [phone]  Fax: [phone]              Simona Jensen  1950    Encounter Date: 7/11/2017    Jonathan Malagon M.D.          PROGRESS NOTE:  Subjective:   Simona Jensen is a 66 y.o. female who presents today in annual follow-up for paroxysmal atrial fibrillation prevented with flecainide therapy.   She feels fine with no new medical problems at all in the last year.  Pacemaker interrogation today reveals no mode switching.  Tolerating flecainide without symptoms  Past Medical History   Diagnosis Date   • A-fib (CMS-HCC)    • SSS (sick sinus syndrome) (CMS-HCC)    • Hyperlipidemia    • Menopause    • Pacemaker June 2010   • Other specified disorder of intestines      Divertuculosis   • Cataract      OU     Past Surgical History   Procedure Laterality Date   • Hysterectomy, total abdominal     • Pacemaker insertion  June 2010     [employer] Altrua 60 [id number] implanted by Dr. Marte, [facility].   • Low anterior resection robotic  5/28/2013     Performed by Prashant Almanzar M.D. at SURGERY [facility] ORS   • Breast biopsy  4/28/2014     Performed by Alex Cruz M.D. at SURGERY SAME DAY [facility] ORS   • Pacemaker insertion     • Abdominal hysterectomy total       still has ovaries     Family History   Problem Relation Age of Onset   • Arrythmia Mother    • Diabetes Mother    • Cancer Mother      breast, lung   • Hypertension Mother    • Hyperlipidemia Mother    • Arrythmia Brother    • Diabetes Brother    • Hypertension Brother    • Cancer Brother      Prsotate CA   • Heart Disease Brother    • Heart Disease Father    • Cancer Maternal Aunt      breast     History   Smoking status   • Never Smoker    Smokeless tobacco   • Never Used     Allergies   Allergen Reactions   • Nkda [No Known Drug Allergy]      Outpatient Encounter Prescriptions  "as of 7/11/2017   Medication Sig Dispense Refill   • trazodone (DESYREL) 100 MG Tab Take 1 tablet by mouth  every night at bedtime 90 Tab 0   • simvastatin (ZOCOR) 40 MG Tab Take 1 tablet by mouth  every evening 90 Tab 0   • Cholecalciferol (VITAMIN D3) 2000 UNIT Cap Take  by mouth.     • flecainide (TAMBOCOR) 100 MG Tab Take 1 Tab by mouth 2 times a day. 180 Tab 3   • aspirin EC (ECOTRIN) 81 MG TBEC Take 81 mg by mouth every day.     • MULTIPLE VITAMINS PO Take 1 Tab by mouth every day.     • Cholecalciferol (VITAMIN D-3 PO) Take  by mouth.     • Probiotic Product (PROBIOTIC DAILY PO) Take  by mouth.       No facility-administered encounter medications on file as of 7/11/2017.     Review of Systems   Constitutional: Negative for fever, chills and malaise/fatigue.   Eyes: Negative for blurred vision and discharge.   Respiratory: Negative for cough and shortness of breath.    Cardiovascular: Negative for chest pain, palpitations, leg swelling and PND.   Gastrointestinal: Negative for heartburn and nausea.   Genitourinary: Negative for dysuria and urgency.   Musculoskeletal: Negative for myalgias.   Skin: Negative for itching and rash.   Neurological: Negative for dizziness and headaches.   Endo/Heme/Allergies: Negative for environmental allergies. Does not bruise/bleed easily.   Psychiatric/Behavioral: Negative for depression. The patient is not nervous/anxious.         Objective:   /70 mmHg  Pulse 60  Ht 1.651 m (5' 5\")  Wt 68.04 kg (150 lb)  BMI 24.96 kg/m2  SpO2 92%    Physical Exam   Constitutional: She is oriented to person, place, and time. She appears well-developed and well-nourished.   HENT:   Head: Normocephalic and atraumatic.   Eyes: Conjunctivae and EOM are normal. No scleral icterus.   Neck: Neck supple. No JVD present. No thyromegaly present.   Cardiovascular: Normal rate and regular rhythm.  Exam reveals no gallop and no friction rub.    No murmur heard.  Pulmonary/Chest: Effort normal and " breath sounds normal. No respiratory distress. She has no wheezes. She has no rales. She exhibits no tenderness.   Pacemaker generator left sub-clavicular area not tender and not inflamed   Abdominal: Soft. Bowel sounds are normal. She exhibits no distension and no mass. There is no tenderness.   Neurological: She is alert and oriented to person, place, and time. Coordination normal.   Skin: Skin is warm and dry. No rash noted. No pallor.   Psychiatric: She has a normal mood and affect. Her behavior is normal. Judgment and thought content normal.       Assessment:     1. PAF (paroxysmal atrial fibrillation) (CMS-HCC)     2. Cardiac pacemaker in situ     3. Mixed hyperlipidemia         Medical Decision Making:  Today's Assessment / Status / Plan:   Clinical status is stable.  No atrial fibrillation.  Continue flecainide at current dosage.  Pacemaker check 6 months  Return to see me one year        Juan Sanchez M.D.  [address]  [place] [address]  VIA In Basket

## 2017-07-11 NOTE — MR AVS SNAPSHOT
"        Simona Jensen   2017 1:00 PM   Office Visit   MRN: [id number]    Department:  [facility]   Dept Phone:  [phone]    Description:  Female : 1950   Provider:  Jonathan Malagon M.D.           Allergies as of 2017     Allergen Noted Reactions    Nkda [No Known Drug Allergy] 2012         You were diagnosed with     PAF (paroxysmal atrial fibrillation) (CMS-HCC)   [611172]       Cardiac pacemaker in situ   [V45.01.ICD-9-CM]       Mixed hyperlipidemia   [272.2.ICD-9-CM]         Vital Signs     Blood Pressure Pulse Height Weight Body Mass Index Oxygen Saturation    110/70 mmHg 60 1.651 m (5' 5\") 68.04 kg (150 lb) 24.96 kg/m2 92%    Smoking Status                   Never Smoker            Basic Information     Date Of Birth Sex Race Ethnicity Preferred Language    1950 Female White Non- English      Your appointments     Sep 11, 2017  1:40 PM   Established Patient with Juan Sanchez M.D.   [facility] (--)    [address]  [address]  [place] [id number] [phone]           You will be receiving a confirmation call a few days before your appointment from our automated call confirmation system.            2018 12:40 PM   PACER CHECK ONLY with JUSTIN Matthews   [facility]    [address]  [address]  [place] [id number] [phone]              Problem List              ICD-10-CM Priority Class Noted - Resolved    Cardiac pacemaker in situ Z95.0   2012 - Present    SSS (sick sinus syndrome) (CMS-HCC) I49.5   2012 - Present    Diverticulosis of large intestine without hemorrhage K57.30   2013 - Present    PAF (paroxysmal atrial fibrillation) (CMS-HCC) I48.0   2013 - Present    Hyperlipidemia E78.5   2014 - Present    Insomnia G47.00   2014 - Present    Vitamin D insufficiency E55.9   2015 - Present    Family history of breast " cancer in mother Z80.3   12/8/2015 - Present      Health Maintenance        Date Due Completion Dates    IMM INFLUENZA (1) 9/1/2017 9/11/2016, 10/28/2015, 10/18/2014, 10/15/2013, 10/15/2012    IMM PNEUMOCOCCAL 65+ (ADULT) LOW/MEDIUM RISK SERIES (2 of 2 - PPSV23) 11/9/2017 11/9/2016    MAMMOGRAM 12/28/2017 12/28/2016, 12/8/2015, 12/4/2014, 12/16/2013, 12/9/2013, 12/15/2012 (Prv Comp)    Override on 12/15/2012: Previously completed    COLONOSCOPY 10/15/2019 10/15/2009 (Prv Comp)    Override on 10/15/2009: Previously completed    IMM DTaP/Tdap/Td Vaccine (2 - Td) 1/10/2021 1/10/2011    BONE DENSITY 11/9/2021 11/9/2016            Current Immunizations     13-VALENT PCV PREVNAR 11/9/2016    Dtap Vaccine 1/10/2011    Influenza TIV (IM) 10/28/2015    Influenza Vac Subunit Quad Inj (Pf) 10/15/2012    Influenza Vaccine Adult HD 9/11/2016    Influenza Vaccine Quad Inj (Preserved) 10/18/2014, 10/15/2013    SHINGLES VACCINE 10/15/2012      Below and/or attached are the medications your provider expects you to take. Review all of your home medications and newly ordered medications with your provider and/or pharmacist. Follow medication instructions as directed by your provider and/or pharmacist. Please keep your medication list with you and share with your provider. Update the information when medications are discontinued, doses are changed, or new medications (including over-the-counter products) are added; and carry medication information at all times in the event of emergency situations     Allergies:  NKDA - (reactions not documented)               Medications  Valid as of: July 11, 2017 -  1:23 PM    Generic Name Brand Name Tablet Size Instructions for use    Aspirin (Tablet Delayed Response) ECOTRIN 81 MG Take 81 mg by mouth every day.        Cholecalciferol   Take  by mouth.        Cholecalciferol (Cap) Vitamin D3 2000 UNIT Take  by mouth.        Flecainide Acetate (Tab) TAMBOCOR 100 MG Take 1 Tab by mouth 2 times a day.          Multiple Vitamin   Take 1 Tab by mouth every day.        Probiotic Product   Take  by mouth.        Simvastatin (Tab) ZOCOR 40 MG Take 1 tablet by mouth  every evening        TraZODone HCl (Tab) DESYREL 100 MG Take 1 tablet by mouth  every night at bedtime        .                 Medicines prescribed today were sent to:     [facility] - [place], [place] - [address]    [address] [address] [facility] [address]    Phone: [phone] Fax: [phone]    Open 24 Hours?: No    [facility] [address] [place] [address] (), [place] - [address]    [address] () [place] [address]    Phone: [phone] Fax: [phone]    Open 24 Hours?: No      Medication refill instructions:       If your prescription bottle indicates you have medication refills left, it is not necessary to call your provider’s office. Please contact your pharmacy and they will refill your medication.    If your prescription bottle indicates you do not have any refills left, you may request refills at any time through one of the following ways: The online [employer] system (except Urgent Care), by calling your provider’s office, or by asking your pharmacy to contact your provider’s office with a refill request. Medication refills are processed only during regular business hours and may not be available until the next business day. Your provider may request additional information or to have a follow-up visit with you prior to refilling your medication.   *Please Note: Medication refills are assigned a new Rx number when refilled electronically. Your pharmacy may indicate that no refills were authorized even though a new prescription for the same medication is available at the pharmacy. Please request the medicine by name with the pharmacy before contacting your provider for a refill.           [employer] Access Code: Activation code not generated  Current [employer] Status: Active

## 2017-07-11 NOTE — PROGRESS NOTES
Subjective:   Simona Jensen is a 66 y.o. female who presents today in annual follow-up for paroxysmal atrial fibrillation prevented with flecainide therapy.   She feels fine with no new medical problems at all in the last year.  Pacemaker interrogation today reveals no mode switching.  Tolerating flecainide without symptoms  Past Medical History   Diagnosis Date   • A-fib (CMS-HCC)    • SSS (sick sinus syndrome) (CMS-HCC)    • Hyperlipidemia    • Menopause    • Pacemaker June 2010   • Other specified disorder of intestines      Divertuculosis   • Cataract      OU     Past Surgical History   Procedure Laterality Date   • Hysterectomy, total abdominal     • Pacemaker insertion  June 2010     [employer] Altrua 60 S606 implanted by Dr. Marte, [facility].   • Low anterior resection robotic  5/28/2013     Performed by Prashant Almanzar M.D. at SURGERY [facility] ORS   • Breast biopsy  4/28/2014     Performed by Alex Cruz M.D. at SURGERY SAME DAY [facility] ORS   • Pacemaker insertion     • Abdominal hysterectomy total       still has ovaries     Family History   Problem Relation Age of Onset   • Arrythmia Mother    • Diabetes Mother    • Cancer Mother      breast, lung   • Hypertension Mother    • Hyperlipidemia Mother    • Arrythmia Brother    • Diabetes Brother    • Hypertension Brother    • Cancer Brother      Prsotate CA   • Heart Disease Brother    • Heart Disease Father    • Cancer Maternal Aunt      breast     History   Smoking status   • Never Smoker    Smokeless tobacco   • Never Used     Allergies   Allergen Reactions   • Nkda [No Known Drug Allergy]      Outpatient Encounter Prescriptions as of 7/11/2017   Medication Sig Dispense Refill   • trazodone (DESYREL) 100 MG Tab Take 1 tablet by mouth  every night at bedtime 90 Tab 0   • simvastatin (ZOCOR) 40 MG Tab Take 1 tablet by mouth  every evening 90 Tab 0   • Cholecalciferol (VITAMIN D3) 2000 UNIT Cap Take  by mouth.     •  "flecainide (TAMBOCOR) 100 MG Tab Take 1 Tab by mouth 2 times a day. 180 Tab 3   • aspirin EC (ECOTRIN) 81 MG TBEC Take 81 mg by mouth every day.     • MULTIPLE VITAMINS PO Take 1 Tab by mouth every day.     • Cholecalciferol (VITAMIN D-3 PO) Take  by mouth.     • Probiotic Product (PROBIOTIC DAILY PO) Take  by mouth.       No facility-administered encounter medications on file as of 7/11/2017.     Review of Systems   Constitutional: Negative for fever, chills and malaise/fatigue.   Eyes: Negative for blurred vision and discharge.   Respiratory: Negative for cough and shortness of breath.    Cardiovascular: Negative for chest pain, palpitations, leg swelling and PND.   Gastrointestinal: Negative for heartburn and nausea.   Genitourinary: Negative for dysuria and urgency.   Musculoskeletal: Negative for myalgias.   Skin: Negative for itching and rash.   Neurological: Negative for dizziness and headaches.   Endo/Heme/Allergies: Negative for environmental allergies. Does not bruise/bleed easily.   Psychiatric/Behavioral: Negative for depression. The patient is not nervous/anxious.         Objective:   /70 mmHg  Pulse 60  Ht 1.651 m (5' 5\")  Wt 68.04 kg (150 lb)  BMI 24.96 kg/m2  SpO2 92%    Physical Exam   Constitutional: She is oriented to person, place, and time. She appears well-developed and well-nourished.   HENT:   Head: Normocephalic and atraumatic.   Eyes: Conjunctivae and EOM are normal. No scleral icterus.   Neck: Neck supple. No JVD present. No thyromegaly present.   Cardiovascular: Normal rate and regular rhythm.  Exam reveals no gallop and no friction rub.    No murmur heard.  Pulmonary/Chest: Effort normal and breath sounds normal. No respiratory distress. She has no wheezes. She has no rales. She exhibits no tenderness.   Pacemaker generator left sub-clavicular area not tender and not inflamed   Abdominal: Soft. Bowel sounds are normal. She exhibits no distension and no mass. There is no " tenderness.   Neurological: She is alert and oriented to person, place, and time. Coordination normal.   Skin: Skin is warm and dry. No rash noted. No pallor.   Psychiatric: She has a normal mood and affect. Her behavior is normal. Judgment and thought content normal.       Assessment:     1. PAF (paroxysmal atrial fibrillation) (CMS-HCC)     2. Cardiac pacemaker in situ     3. Mixed hyperlipidemia         Medical Decision Making:  Today's Assessment / Status / Plan:   Clinical status is stable.  No atrial fibrillation.  Continue flecainide at current dosage.  Pacemaker check 6 months  Return to see me one year

## 2017-07-19 RX ORDER — SIMVASTATIN 40 MG
TABLET ORAL
Qty: 90 TAB | Refills: 1 | Status: SHIPPED | OUTPATIENT
Start: 2017-07-19 | End: 2017-11-30 | Stop reason: SDUPTHER

## 2017-09-11 ENCOUNTER — OFFICE VISIT (OUTPATIENT)
Dept: MEDICAL GROUP | Facility: PHYSICIAN GROUP | Age: 67
End: 2017-09-11
Payer: MEDICARE

## 2017-09-11 VITALS
HEIGHT: 65 IN | SYSTOLIC BLOOD PRESSURE: 114 MMHG | OXYGEN SATURATION: 92 % | BODY MASS INDEX: 25.49 KG/M2 | HEART RATE: 60 BPM | TEMPERATURE: 98.9 F | RESPIRATION RATE: 16 BRPM | DIASTOLIC BLOOD PRESSURE: 68 MMHG | WEIGHT: 153 LBS

## 2017-09-11 DIAGNOSIS — Z95.0 CARDIAC PACEMAKER IN SITU: ICD-10-CM

## 2017-09-11 DIAGNOSIS — E78.2 MIXED HYPERLIPIDEMIA: ICD-10-CM

## 2017-09-11 DIAGNOSIS — E55.9 VITAMIN D INSUFFICIENCY: ICD-10-CM

## 2017-09-11 DIAGNOSIS — I49.5 SSS (SICK SINUS SYNDROME) (HCC): ICD-10-CM

## 2017-09-11 DIAGNOSIS — K57.30 DIVERTICULOSIS OF LARGE INTESTINE WITHOUT HEMORRHAGE: ICD-10-CM

## 2017-09-11 DIAGNOSIS — G47.00 INSOMNIA, UNSPECIFIED TYPE: ICD-10-CM

## 2017-09-11 DIAGNOSIS — H91.93 HEARING PROBLEM OF BOTH EARS: ICD-10-CM

## 2017-09-11 DIAGNOSIS — Z80.3 FAMILY HISTORY OF BREAST CANCER IN MOTHER: ICD-10-CM

## 2017-09-11 DIAGNOSIS — Z23 NEED FOR VACCINATION: ICD-10-CM

## 2017-09-11 DIAGNOSIS — I48.0 PAF (PAROXYSMAL ATRIAL FIBRILLATION) (HCC): ICD-10-CM

## 2017-09-11 PROCEDURE — 90662 IIV NO PRSV INCREASED AG IM: CPT | Performed by: INTERNAL MEDICINE

## 2017-09-11 PROCEDURE — G0438 PPPS, INITIAL VISIT: HCPCS | Mod: 25 | Performed by: INTERNAL MEDICINE

## 2017-09-11 PROCEDURE — G0008 ADMIN INFLUENZA VIRUS VAC: HCPCS | Performed by: INTERNAL MEDICINE

## 2017-09-11 ASSESSMENT — PAIN SCALES - GENERAL: PAINLEVEL: NO PAIN

## 2017-09-11 NOTE — PROGRESS NOTES
Chief Complaint   Patient presents with   • Annual Exam         HPI:  Simona Jensen is a 66 y.o. here for Medicare Annual Wellness Visit     Patient Active Problem List    Diagnosis Date Noted   • Hearing problem of both ears 09/11/2017   • Vitamin D insufficiency 12/08/2015   • Family history of breast cancer in mother 12/08/2015   • Hyperlipidemia 02/25/2014   • Insomnia 02/25/2014   • PAF (paroxysmal atrial fibrillation) (CMS-HCC) 08/14/2013   • Diverticulosis of large intestine without hemorrhage 04/11/2013   • Cardiac pacemaker in situ 01/25/2012   • SSS (sick sinus syndrome) (CMS-HCC) 01/25/2012       Current Outpatient Prescriptions   Medication Sig Dispense Refill   • simvastatin (ZOCOR) 40 MG Tab Take 1 tablet by mouth  every evening 90 Tab 1   • trazodone (DESYREL) 100 MG Tab Take 1 tablet by mouth  every night at bedtime 90 Tab 0   • Cholecalciferol (VITAMIN D3) 2000 UNIT Cap Take  by mouth.     • flecainide (TAMBOCOR) 100 MG Tab Take 1 Tab by mouth 2 times a day. 180 Tab 3   • aspirin EC (ECOTRIN) 81 MG TBEC Take 81 mg by mouth every day.     • MULTIPLE VITAMINS PO Take 1 Tab by mouth every day.       No current facility-administered medications for this visit.             Current supplements as per medication list.       Allergies: Nkda [no known drug allergy]    Current social contact/activities: elina, she likes bari, yogas, plays card, goes to movies, with friends      She  reports that she has never smoked. She has never used smokeless tobacco. She reports that she drinks about 0.5 oz of alcohol per week . She reports that she does not use drugs.  Counseling given: Not Answered        DPA/Advanced Directive:  Patient has Advanced Directive and Durable Power of  on file.       ROS:    Gait: Uses no assistive device   Ostomy: no   Other tubes: no   Amputations: yes   Chronic oxygen use: no   Last eye exam:09/2016   : Reports stress incontinence, not significant        Screenin  Depression Screening    Little interest or pleasure in doing things?   0  Feeling down, depressed , or hopeless?  0  Trouble falling or staying asleep, or sleeping too much?   Falling and waking up at night   Feeling tired or having little energy?   0  Poor appetite or overeating?   0  Feeling bad about yourself - or that you are a failure or have let yourself or your family down?  0  Trouble concentrating on things, such as reading the newspaper or watching television?  0  Moving or speaking so slowly that other people could have noticed.  Or the opposite - being so fidgety or restless that you have been moving around a lot more than usual?   0  Thoughts that you would be better off dead, or of hurting yourself?   0  Patient Health Questionnaire Score:      If depressive symptoms identified deferred to follow up visit unless specifically addressed in assessment and plan.    Interpretation of PHQ-9 Total Score   Score Severity   1-4 No Depression   5-9 Mild Depression   10-14 Moderate Depression   15-19 Moderately Severe Depression   20-27 Severe Depression      Screening for Cognitive Impairment    Three Minute Recall (apple, watch, alisa)  3 /3    Draw clock face with all 12 numbers set to the hand to show 10 minutes past 11 o'clock       Cognitive concerns identified deferred for follow up unless specifically addressed in assessment and plan.    Fall Risk Assessment    Has the patient had two or more falls in the last year or any fall with injury in the last year?   no    Safety Assessment    Throw rugs on floor.     Handrails on all stairs.     Good lighting in all hallways.     Difficulty hearing.     Patient counseled about all safety risks that were identified.    Functional Assessment ADLs    Are there any barriers preventing you from cooking for yourself or meeting nutritional needs?   .  no  Are there any barriers preventing you from driving safely or obtaining transportation?   .   no  Are there any barriers preventing you from using a telephone or calling for help?   .  no  Are there any barriers preventing you from shopping?   .  no  Are there any barriers preventing you from taking care of your own finances?   .  no  Are there any barriers preventing you from managing your medications?   .  no  Are currently engaging any exercise or physical activity?   . yes      Health Maintenance Summary                Annual Wellness Visit Overdue 1950     IMM INFLUENZA Overdue 9/1/2017      Done 9/11/2016 Imm Admin: Influenza Vaccine Adult HD     Patient has more history with this topic...    IMM PNEUMOCOCCAL 65+ (ADULT) LOW/MEDIUM RISK SERIES Next Due 11/9/2017      Done 11/9/2016 Imm Admin: Pneumococcal Conjugate Vaccine (Prevnar/PCV-13)    MAMMOGRAM Next Due 12/28/2017      Done 12/28/2016 MA-MAMMO SCREENING BILAT W/TOMOSYNTHESIS W/CAD     Patient has more history with this topic...    COLONOSCOPY Next Due 10/15/2019      Previously completed 10/15/2009     IMM DTaP/Tdap/Td Vaccine Next Due 1/10/2021      Done 1/10/2011 Imm Admin: Dtap Vaccine    BONE DENSITY Next Due 11/9/2021      Done 11/9/2016 DS-BONE DENSITY STUDY (DEXA)          Patient Care Team:  Juan Sanchez M.D. as PCP - General (Internal Medicine)  Jonathan Malagon M.D. as Consulting Physician (Cardiology)  Alex Cruz M.D. as Consulting Physician (Surgery)  Devon Cook O.D. as Consulting Physician (Optometry)      Social History   Substance Use Topics   • Smoking status: Never Smoker   • Smokeless tobacco: Never Used   • Alcohol use 0.5 oz/week     1 Glasses of wine per week      Comment: social, 1 per week     Family History   Problem Relation Age of Onset   • Arrythmia Mother    • Diabetes Mother    • Cancer Mother      breast, lung   • Hypertension Mother    • Hyperlipidemia Mother    • Arrythmia Brother    • Diabetes Brother    • Hypertension Brother    • Cancer Brother      Prsotate CA   • Heart Disease Brother   "  • Heart Disease Father    • Cancer Maternal Aunt      breast     She  has a past medical history of A-fib (CMS-HCC); Cataract; Hyperlipidemia; Menopause; Other specified disorder of intestines; Pacemaker (June 2010); and SSS (sick sinus syndrome) (CMS-HCC). She also has no past medical history of Breast cancer (CMS-HCC).   Past Surgical History:   Procedure Laterality Date   • BREAST BIOPSY  4/28/2014    Performed by Alex Cruz M.D. at SURGERY SAME DAY [facility] ORS   • LOW ANTERIOR RESECTION ROBOTIC  5/28/2013    Performed by Prashant Almanzar M.D. at SURGERY [facility] ORS   • PACEMAKER INSERTION  June 2010    [employer] Altrua 60 [id number] implanted by Dr. Marte, [facility].   • ABDOMINAL HYSTERECTOMY TOTAL      still has ovaries   • HYSTERECTOMY, TOTAL ABDOMINAL     • PACEMAKER INSERTION         Exam:     Blood pressure 114/68, pulse 60, temperature 37.2 °C (98.9 °F), resp. rate 16, height 1.651 m (5' 5\"), weight 69.4 kg (153 lb), SpO2 92 %, not currently breastfeeding. Body mass index is 25.46 kg/m².    Hearing fair.    Dentition good  Alert, oriented in no acute distress.  Eye contact is good, speech goal directed, affect calm      Assessment and Plan. The following treatment and monitoring plan is recommended:    1. Hearing problem of both ears  REFERRAL TO AUDIOLOGY   2. Vitamin D insufficiency     3. Mixed hyperlipidemia     4. Insomnia, unspecified type     5. PAF (paroxysmal atrial fibrillation) (CMS-HCC)     6. Need for vaccination  INFLUENZA VACCINE, HIGH DOSE (65+ ONLY)   7. Cardiac pacemaker in situ     8. SSS (sick sinus syndrome) (CMS-HCC)     9. Family history of breast cancer in mother     10. Diverticulosis of large intestine without hemorrhage       1. Hearing problem of both ears  Follow up on audiology. Chronic, left worse the right   - REFERRAL TO AUDIOLOGY    2. Vitamin D insufficiency  Last lab work 05/2017 nl. Continue to monitor     3. Mixed hyperlipidemia  Well " managed, last lab work 05/2017. Tolerating current regimen well, simvastatin 40 mg daily     4. Insomnia, unspecified type  Chronic, trouble falling a sleep. Trazodone helps    5. PAF (paroxysmal atrial fibrillation) (CMS-HCC)  History of paroxsymal a-fib since 2010. She is on flecainide. Asa for anticoagulation. Follows with Dr. Call   History of SSS. S/p pacemaker in place.     6. Need for vaccination  Flu shot today  She is due for prevnar 11/2017   - INFLUENZA VACCINE, HIGH DOSE (65+ ONLY)    7. Cardiac pacemaker in situ  Follow up with cards     8. SSS (sick sinus syndrome) (CMS-HCC)  Follow up with cards     9. Family history of breast cancer in mother  Normal mammogram 12/2016       Services suggested: No services needed at this time  Health Care Screening: Age-appropriate preventive services Medicare covers discussed today and ordered if indicated.  Referrals offered: Community-based lifestyle interventions to reduce health risks and promote self-management and wellness, fall prevention, nutrition, physical activity, tobacco-use cessation, weight loss, and mental health services as per orders if indicated.    Discussion today about general wellness and lifestyle habits:    · Prevent falls and reduce trip hazards; Cautioned about securing or removing rugs.  · Have a working fire alarm and carbon monoxide detector;   · Engage in regular physical activity and social activities       Follow-up: Return if symptoms worsen or fail to improve.

## 2017-09-15 DIAGNOSIS — G47.00 INSOMNIA, UNSPECIFIED TYPE: ICD-10-CM

## 2017-09-15 RX ORDER — TRAZODONE HYDROCHLORIDE 100 MG/1
TABLET ORAL
Qty: 90 TAB | Refills: 1 | Status: SHIPPED | OUTPATIENT
Start: 2017-09-15 | End: 2018-06-04 | Stop reason: SDUPTHER

## 2017-11-11 DIAGNOSIS — I48.0 PAF (PAROXYSMAL ATRIAL FIBRILLATION) (HCC): ICD-10-CM

## 2017-11-13 RX ORDER — FLECAINIDE ACETATE 100 MG/1
TABLET ORAL
Qty: 180 TAB | Refills: 2 | Status: SHIPPED | OUTPATIENT
Start: 2017-11-13 | End: 2018-07-03 | Stop reason: SDUPTHER

## 2017-11-29 ENCOUNTER — APPOINTMENT (RX ONLY)
Dept: URBAN - METROPOLITAN AREA CLINIC 4 | Facility: CLINIC | Age: 67
Setting detail: DERMATOLOGY
End: 2017-11-29

## 2017-11-29 DIAGNOSIS — L81.4 OTHER MELANIN HYPERPIGMENTATION: ICD-10-CM

## 2017-11-29 DIAGNOSIS — L82.1 OTHER SEBORRHEIC KERATOSIS: ICD-10-CM

## 2017-11-29 DIAGNOSIS — D18.0 HEMANGIOMA: ICD-10-CM

## 2017-11-29 DIAGNOSIS — D22 MELANOCYTIC NEVI: ICD-10-CM

## 2017-11-29 DIAGNOSIS — Z71.89 OTHER SPECIFIED COUNSELING: ICD-10-CM

## 2017-11-29 PROBLEM — D22.72 MELANOCYTIC NEVI OF LEFT LOWER LIMB, INCLUDING HIP: Status: ACTIVE | Noted: 2017-11-29

## 2017-11-29 PROBLEM — D18.01 HEMANGIOMA OF SKIN AND SUBCUTANEOUS TISSUE: Status: ACTIVE | Noted: 2017-11-29

## 2017-11-29 PROBLEM — D22.9 MELANOCYTIC NEVI, UNSPECIFIED: Status: ACTIVE | Noted: 2017-11-29

## 2017-11-29 PROBLEM — E78.5 HYPERLIPIDEMIA, UNSPECIFIED: Status: ACTIVE | Noted: 2017-11-29

## 2017-11-29 PROCEDURE — 99202 OFFICE O/P NEW SF 15 MIN: CPT

## 2017-11-29 PROCEDURE — ? OBSERVATION AND MEASURE

## 2017-11-29 PROCEDURE — ? COUNSELING

## 2017-11-29 ASSESSMENT — LOCATION SIMPLE DESCRIPTION DERM
LOCATION SIMPLE: LEFT FOOT
LOCATION SIMPLE: RIGHT EAR
LOCATION SIMPLE: LEFT UPPER BACK
LOCATION SIMPLE: RIGHT UPPER BACK

## 2017-11-29 ASSESSMENT — LOCATION ZONE DERM
LOCATION ZONE: EAR
LOCATION ZONE: TRUNK
LOCATION ZONE: FEET

## 2017-11-29 ASSESSMENT — LOCATION DETAILED DESCRIPTION DERM
LOCATION DETAILED: RIGHT POSTERIOR EAR
LOCATION DETAILED: LEFT LATERAL PLANTAR FOOT
LOCATION DETAILED: LEFT MID-UPPER BACK
LOCATION DETAILED: RIGHT MEDIAL UPPER BACK

## 2017-11-29 NOTE — HPI: FULL BODY SKIN EXAMINATION
What Is The Reason For Today's Visit?: Full Body Skin Examination
What Is The Reason For Today's Visit? (Being Monitored For X): concerning skin lesions on an annual basis
Additional History: Pt has no concerns today. She has not noticed any changes with her moles or any tender or bleeding spots.

## 2017-11-30 RX ORDER — SIMVASTATIN 40 MG
TABLET ORAL
Qty: 90 TAB | Refills: 3 | Status: SHIPPED | OUTPATIENT
Start: 2017-11-30 | End: 2018-10-17 | Stop reason: SDUPTHER

## 2017-11-30 NOTE — TELEPHONE ENCOUNTER
Was the patient seen in the last year in this department? YES     Does patient have an active prescription for medications requested? No     Received Request Via: Pharmacy

## 2017-12-29 ENCOUNTER — HOSPITAL ENCOUNTER (OUTPATIENT)
Dept: RADIOLOGY | Facility: MEDICAL CENTER | Age: 67
End: 2017-12-29
Attending: INTERNAL MEDICINE
Payer: MEDICARE

## 2017-12-29 DIAGNOSIS — Z12.31 SCREENING MAMMOGRAM, ENCOUNTER FOR: ICD-10-CM

## 2017-12-29 PROCEDURE — G0202 SCR MAMMO BI INCL CAD: HCPCS

## 2018-01-16 ENCOUNTER — NON-PROVIDER VISIT (OUTPATIENT)
Dept: CARDIOLOGY | Facility: MEDICAL CENTER | Age: 68
End: 2018-01-16
Payer: MEDICARE

## 2018-01-16 VITALS
WEIGHT: 146 LBS | HEIGHT: 66 IN | BODY MASS INDEX: 23.46 KG/M2 | HEART RATE: 60 BPM | DIASTOLIC BLOOD PRESSURE: 70 MMHG | SYSTOLIC BLOOD PRESSURE: 100 MMHG

## 2018-01-16 DIAGNOSIS — I48.0 PAF (PAROXYSMAL ATRIAL FIBRILLATION) (HCC): ICD-10-CM

## 2018-01-16 DIAGNOSIS — Z95.0 CARDIAC PACEMAKER IN SITU: ICD-10-CM

## 2018-01-16 DIAGNOSIS — I49.5 SSS (SICK SINUS SYNDROME) (HCC): ICD-10-CM

## 2018-01-16 PROCEDURE — 93288 INTERROG EVL PM/LDLS PM IP: CPT | Performed by: NURSE PRACTITIONER

## 2018-01-16 NOTE — PROGRESS NOTES
Device is working normally. No mode switching episodes.  Normal sensing and capture of RA and RV leads; stable impedances. Battery longevity is 2 years.  No changes are made today.    FU in 6 months for next PM check with me, as well as annual follow-up with Dr. Malagon.    Collaborating MD: Manas

## 2018-03-21 ENCOUNTER — OFFICE VISIT (OUTPATIENT)
Dept: INTERNAL MEDICINE | Facility: MEDICAL CENTER | Age: 68
End: 2018-03-21
Payer: MEDICARE

## 2018-03-21 VITALS
WEIGHT: 151.2 LBS | SYSTOLIC BLOOD PRESSURE: 110 MMHG | HEIGHT: 66 IN | OXYGEN SATURATION: 94 % | DIASTOLIC BLOOD PRESSURE: 78 MMHG | BODY MASS INDEX: 24.3 KG/M2 | TEMPERATURE: 99.5 F | HEART RATE: 66 BPM

## 2018-03-21 DIAGNOSIS — M76.32 ILIOTIBIAL BAND SYNDROME AFFECTING LEFT LOWER LEG: ICD-10-CM

## 2018-03-21 PROCEDURE — 99214 OFFICE O/P EST MOD 30 MIN: CPT | Mod: GC | Performed by: INTERNAL MEDICINE

## 2018-03-21 RX ORDER — TIZANIDINE 4 MG/1
4 TABLET ORAL EVERY 6 HOURS PRN
Qty: 16 TAB | Refills: 0 | Status: SHIPPED | OUTPATIENT
Start: 2018-03-21 | End: 2018-07-31

## 2018-03-21 RX ORDER — METHYLPREDNISOLONE 4 MG/1
TABLET ORAL
Qty: 21 TAB | Refills: 0 | Status: SHIPPED | OUTPATIENT
Start: 2018-03-21 | End: 2018-03-21 | Stop reason: SDUPTHER

## 2018-03-21 RX ORDER — TIZANIDINE 4 MG/1
4 TABLET ORAL EVERY 6 HOURS PRN
Qty: 16 TAB | Refills: 0 | Status: SHIPPED | OUTPATIENT
Start: 2018-03-21 | End: 2018-03-21 | Stop reason: SDUPTHER

## 2018-03-21 RX ORDER — METHYLPREDNISOLONE 4 MG/1
TABLET ORAL
Qty: 21 TAB | Refills: 0 | Status: SHIPPED | OUTPATIENT
Start: 2018-03-21 | End: 2018-07-31

## 2018-03-21 ASSESSMENT — ENCOUNTER SYMPTOMS
VOMITING: 0
PALPITATIONS: 0
NAUSEA: 0
FALLS: 0
CHILLS: 0
FEVER: 0
DIARRHEA: 0

## 2018-03-21 ASSESSMENT — PAIN SCALES - GENERAL: PAINLEVEL: 8=MODERATE-SEVERE PAIN

## 2018-03-21 NOTE — PATIENT INSTRUCTIONS
Iliotibial Band Syndrome  Iliotibial band syndrome is pain in the outer, lower thigh. The pain is caused by an inflammation of the iliotibial band. This is a band of thick fibrous tissue that runs down the outside of the thigh. The iliotibial band begins at the hip. It extends to the outer side of the shin bone (tibia) just below the knee joint. The band works with the thigh muscles. Together they provide stability to the outside of the knee joint.  Iliotibial band syndrome occurs when there is inflammation to this band of tissue. This is typically due to over use and not due to an injury. The irritation usually occurs over the outside of the knee joint, at the the end of the thigh bone (femur). The iliotibial band crosses bone and muscle at this point. Between these structures is a cushioning sac (bursa). The bursa should make possible a smooth gliding motion. However, when inflamed, the iliotibial band does not glide easily. When inflamed, there is pain with motion of the knee. Usually the pain worsens with continued movement and the pain goes away with rest.  This problem usually arises when there is a sudden increase in sports activities involving your legs. Running, and playing soccer or basketball are examples of activities causing this. Others who are prone to iliotibial band syndrome include individuals with mechanical problems such as leg length differences, abnormality of walking, bowed legs etc.  HOME CARE INSTRUCTIONS   · Apply ice to the injured area:  ¨ Put ice in a plastic bag.  ¨ Place a towel between your skin and the bag.  ¨ Leave the ice on for 20 minutes, 2-3 times a day.  · Limit excessive training or eliminate training until pain goes away.  · While pain is present, you may use gentle range of motion. Do not resume regular use until instructed by your health care provider. Begin use gradually. Do not increase activity to the point of pain. If pain does develop, decrease activity and continue  the above measures. Gradually increase activities that do not cause discomfort. Do this until you finally achieve normal use.  · Perform low-impact activities while pain is present. Wear proper footwear.  · Only take over-the-counter or prescription medicines for pain, discomfort, or fever as directed by your health care provider.  SEEK MEDICAL CARE IF:   · Your pain increases or pain is not controlled with medications.  · You develop new, unexplained symptoms, or an increase of the symptoms that brought you to your health care provider.  · Your pain and symptoms are not improving or are getting worse.  This information is not intended to replace advice given to you by your health care provider. Make sure you discuss any questions you have with your health care provider.  Document Released: 06/09/2003 Document Revised: 01/08/2016 Document Reviewed: 07/17/2014  Elsevier Interactive Patient Education © 2017 [employer]

## 2018-03-21 NOTE — PROGRESS NOTES
Established Patient    Simona presents today with the following:    CC: Left hip/leg pain    HPI: This patient is a very pleasant 67-year-old female with past history of dyslipidemia, paroxysmal atrial fibrillation reporting to clinic today for acute complaint of left hip and leg pain. The pain onset on Monday after the patient was exercising vigorously while moving luggage around on her bed. Of note, the patient also engages in daily yoga, and Alice 3 days per week. She also endorses airline travel of approximately 7 hours prior to the onset of pain. She has had no saddle anesthesia, paresthesias, loss of strength or sensation in the lower extremity. She denies trauma, or sick contacts. There is no cording, edema, or erythema in the leg. She reports no other associated symptoms. She has tried extra strength Tylenol over-the-counter for the pain with minimal relief. She is tender to palpation at the left iliac crest, with radiation down the lateral aspect of her left leg to her left calf. Distal pulses, deep tendon reflexes are grossly intact. Strength and sensation are also intact.    Patient Active Problem List    Diagnosis Date Noted   • Hearing problem of both ears 09/11/2017   • Vitamin D insufficiency 12/08/2015   • Family history of breast cancer in mother 12/08/2015   • Hyperlipidemia 02/25/2014   • Insomnia 02/25/2014   • PAF (paroxysmal atrial fibrillation) (CMS-HCC) 08/14/2013   • Diverticulosis of large intestine without hemorrhage 04/11/2013   • Cardiac pacemaker in situ 01/25/2012   • SSS (sick sinus syndrome) (CMS-HCC) 01/25/2012       Current Outpatient Prescriptions   Medication Sig Dispense Refill   • simvastatin (ZOCOR) 40 MG Tab TAKE 1 TABLET EVERY EVENING 90 Tab 3   • flecainide (TAMBOCOR) 100 MG Tab TAKE 1 TABLET TWICE DAILY 180 Tab 2   • trazodone (DESYREL) 100 MG Tab TAKE 1 TABLET EVERY NIGHT AT BEDTIME 90 Tab 1   • Cholecalciferol (VITAMIN D3) 2000 UNIT Cap Take  by mouth.     • aspirin  "EC (ECOTRIN) 81 MG TBEC Take 81 mg by mouth every day.     • MULTIPLE VITAMINS PO Take 1 Tab by mouth every day.       No current facility-administered medications for this visit.        ROS: As per HPI. Additional pertinent symptoms as noted below.  Review of Systems   Constitutional: Negative for chills, fever and malaise/fatigue.   Cardiovascular: Negative for chest pain, palpitations and leg swelling.   Gastrointestinal: Negative for diarrhea, nausea and vomiting.   Genitourinary: Negative for dysuria, frequency and urgency.   Musculoskeletal: Positive for joint pain. Negative for falls.   Skin: Negative for itching and rash.   All other systems reviewed and are negative.        /78   Pulse 66   Temp 37.5 °C (99.5 °F)   Ht 1.676 m (5' 6\")   Wt 68.6 kg (151 lb 3.2 oz)   SpO2 94%   BMI 24.40 kg/m²     Physical Exam   Constitutional: She is oriented to person, place, and time and well-developed, well-nourished, and in no distress. No distress.   HENT:   Head: Normocephalic and atraumatic.   Right Ear: External ear normal.   Left Ear: External ear normal.   Eyes: Conjunctivae are normal. Pupils are equal, round, and reactive to light.   Neck: Normal range of motion. No tracheal deviation present.   Cardiovascular: Normal rate and regular rhythm.    Pulmonary/Chest: Effort normal. No respiratory distress.   Abdominal: Soft. She exhibits no distension.   Musculoskeletal: She exhibits no edema or deformity.   Gait mildly antalgic  Tenderness to palpation at iliac crest and down the lateral aspect of left lower extremity   Neurological: She is alert and oriented to person, place, and time. GCS score is 15.   Skin: Skin is warm and dry. She is not diaphoretic. No erythema.   Psychiatric: Mood, memory, affect and judgment normal.   Nursing note and vitals reviewed.      Note: I have reviewed all pertinent labs and diagnostic tests associated with this visit with specific comments listed under the assessment " and plan below    Assessment and Plan    1. Iliotibial band syndrome affecting left lower leg  Patient educated regarding disease process and self-care. Instructions printed regarding ice, stretches, conservative exercise. Since the patient received only mild relief from extra strength Tylenol, will treat inflammation with Medrol Dosepak. Patient counseled to use over-the-counter ibuprofen after dose pack is complete. Offered physical therapy, however patient deferred at this time and will try conservative measures first. Patient also concerned with inability to sleep due to symptoms, prescribed short course of muscle relaxant to be taken in evenings as needed for sleep.  -Prescriptions: Medrol Dosepak, tizanidine 4 mg ×16  -Self-care instructions        Followup: Return in about 3 months (around 6/21/2018), or if symptoms worsen or fail to improve.      Signed by: Balbir Wan D.O.

## 2018-06-04 DIAGNOSIS — G47.00 INSOMNIA, UNSPECIFIED TYPE: ICD-10-CM

## 2018-06-04 RX ORDER — TRAZODONE HYDROCHLORIDE 100 MG/1
TABLET ORAL
Qty: 90 TAB | Refills: 0 | Status: SHIPPED | OUTPATIENT
Start: 2018-06-04 | End: 2018-08-07 | Stop reason: SDUPTHER

## 2018-07-03 DIAGNOSIS — I48.0 PAF (PAROXYSMAL ATRIAL FIBRILLATION) (HCC): ICD-10-CM

## 2018-07-05 RX ORDER — FLECAINIDE ACETATE 100 MG/1
TABLET ORAL
Qty: 180 TAB | Refills: 2 | Status: SHIPPED | OUTPATIENT
Start: 2018-07-05 | End: 2018-12-20 | Stop reason: SDUPTHER

## 2018-07-31 ENCOUNTER — OFFICE VISIT (OUTPATIENT)
Dept: CARDIOLOGY | Facility: MEDICAL CENTER | Age: 68
End: 2018-07-31
Payer: MEDICARE

## 2018-07-31 ENCOUNTER — NON-PROVIDER VISIT (OUTPATIENT)
Dept: CARDIOLOGY | Facility: MEDICAL CENTER | Age: 68
End: 2018-07-31
Payer: MEDICARE

## 2018-07-31 VITALS
WEIGHT: 159 LBS | SYSTOLIC BLOOD PRESSURE: 124 MMHG | BODY MASS INDEX: 25.55 KG/M2 | HEART RATE: 60 BPM | OXYGEN SATURATION: 93 % | HEIGHT: 66 IN | DIASTOLIC BLOOD PRESSURE: 82 MMHG

## 2018-07-31 VITALS
HEIGHT: 66 IN | BODY MASS INDEX: 25.55 KG/M2 | DIASTOLIC BLOOD PRESSURE: 82 MMHG | HEART RATE: 60 BPM | OXYGEN SATURATION: 93 % | SYSTOLIC BLOOD PRESSURE: 124 MMHG | WEIGHT: 159 LBS

## 2018-07-31 DIAGNOSIS — I49.5 SSS (SICK SINUS SYNDROME) (HCC): ICD-10-CM

## 2018-07-31 DIAGNOSIS — Z95.0 CARDIAC PACEMAKER IN SITU: ICD-10-CM

## 2018-07-31 DIAGNOSIS — I48.0 PAF (PAROXYSMAL ATRIAL FIBRILLATION) (HCC): ICD-10-CM

## 2018-07-31 DIAGNOSIS — E78.2 MIXED HYPERLIPIDEMIA: ICD-10-CM

## 2018-07-31 PROCEDURE — 99213 OFFICE O/P EST LOW 20 MIN: CPT | Performed by: NURSE PRACTITIONER

## 2018-07-31 PROCEDURE — 93280 PM DEVICE PROGR EVAL DUAL: CPT | Performed by: NURSE PRACTITIONER

## 2018-07-31 ASSESSMENT — ENCOUNTER SYMPTOMS
PND: 0
BRUISES/BLEEDS EASILY: 0
CHILLS: 0
ORTHOPNEA: 0
COUGH: 0
SHORTNESS OF BREATH: 0
PALPITATIONS: 0
MYALGIAS: 0
ABDOMINAL PAIN: 0
NAUSEA: 0
DIZZINESS: 0
FEVER: 0
HEADACHES: 0
LOSS OF CONSCIOUSNESS: 0

## 2018-07-31 NOTE — PROGRESS NOTES
Chief Complaint   Patient presents with   • Follow-Up   • Pacemaker Check/Dysfunction   • HTN (Controlled)   • Hyperlipidemia       Subjective:   Simona Jensen is a 67 y.o. female who presents today for six month follow-up for PM check, paroxysmal atrial fibrillation and hyperlipidemia.    Simona is a 67 year old female with history of sick sinus syndrome with pacemaker since June 2010, paroxysmal atrial fibrillation on Flecainide, and hyperlipidemia, normally followed by Dr. Malagon.    Since the last visit, she has been stable. No symptomatic palpitations or fluttering of her heart; no chest pain, pressure or discomfort; no shortness of breath, orthopnea or PND; no dizziness or syncope; no edema. BP has been stable. She has put some weight back on, after losing 25+ pounds on Weight Watchers last year.    Past Medical History:   Diagnosis Date   • Cataract     OU   • Hyperlipidemia    • Menopause    • Other specified disorder of intestines     Divertuculosis   • Pacemaker June 2010   • Paroxysmal atrial fibrillation (HCC)     Treated with Flecainide   • SSS (sick sinus syndrome) (HCC)      Past Surgical History:   Procedure Laterality Date   • BREAST BIOPSY  4/28/2014    Performed by Alex Cruz M.D. at SURGERY SAME DAY [facility] ORS   • LOW ANTERIOR RESECTION ROBOTIC  5/28/2013    Performed by Prashant Almanzar M.D. at SURGERY [facility] ORS   • PACEMAKER INSERTION  June 2010    [place] [employer] Altrua 60 S606 implanted by Dr. Marte, [facility].   • ABDOMINAL HYSTERECTOMY TOTAL      still has ovaries   • HYSTERECTOMY, TOTAL ABDOMINAL     • PACEMAKER INSERTION       Family History   Problem Relation Age of Onset   • Arrythmia Mother    • Diabetes Mother    • Cancer Mother         breast, lung   • Hypertension Mother    • Hyperlipidemia Mother    • Arrythmia Brother    • Diabetes Brother    • Hypertension Brother    • Cancer Brother         Prsotate CA   • Heart Disease Brother    • Heart  Disease Father    • Cancer Maternal Aunt         breast     Social History     Social History   • Marital status:      Spouse name: N/A   • Number of children: N/A   • Years of education: N/A     Occupational History   • Not on file.     Social History Main Topics   • Smoking status: Never Smoker   • Smokeless tobacco: Never Used   • Alcohol use 0.5 oz/week     1 Glasses of wine per week      Comment: social, 1 per week   • Drug use: No   • Sexual activity: Yes     Partners: Male     Other Topics Concern   • Not on file     Social History Narrative   • No narrative on file     Allergies   Allergen Reactions   • Nkda [No Known Drug Allergy]      Outpatient Encounter Prescriptions as of 7/31/2018   Medication Sig Dispense Refill   • flecainide (TAMBOCOR) 100 MG Tab TAKE 1 TABLET TWICE DAILY 180 Tab 2   • traZODone (DESYREL) 100 MG Tab TAKE 1 TABLET EVERY NIGHT AT BEDTIME (SUBSTITUTED FOR  DESYREL) 90 Tab 0   • [DISCONTINUED] MethylPREDNISolone (MEDROL DOSEPAK) 4 MG Tablet Therapy Pack As directed on the packaging label. (Patient not taking: Reported on 7/31/2018) 21 Tab 0   • [DISCONTINUED] tizanidine (ZANAFLEX) 4 MG Tab Take 1 Tab by mouth every 6 hours as needed. (Patient not taking: Reported on 7/31/2018) 16 Tab 0   • simvastatin (ZOCOR) 40 MG Tab TAKE 1 TABLET EVERY EVENING 90 Tab 3   • Cholecalciferol (VITAMIN D3) 2000 UNIT Cap Take  by mouth.     • aspirin EC (ECOTRIN) 81 MG TBEC Take 81 mg by mouth every day.     • MULTIPLE VITAMINS PO Take 1 Tab by mouth every day.       No facility-administered encounter medications on file as of 7/31/2018.      Review of Systems   Constitutional: Negative for chills and fever.   HENT: Negative for congestion.    Respiratory: Negative for cough and shortness of breath.    Cardiovascular: Negative for chest pain, palpitations, orthopnea, leg swelling and PND.   Gastrointestinal: Negative for abdominal pain and nausea.   Musculoskeletal: Negative for myalgias.   Skin:  "Negative for rash.   Neurological: Negative for dizziness, loss of consciousness and headaches.   Endo/Heme/Allergies: Does not bruise/bleed easily.        Objective:   /82   Pulse 60   Ht 1.676 m (5' 6\")   Wt 72.1 kg (159 lb)   SpO2 93%   BMI 25.66 kg/m²     Physical Exam   Constitutional: She is oriented to person, place, and time. She appears well-developed and well-nourished.   Weight is up 8 pounds.   HENT:   Head: Normocephalic.   Eyes: EOM are normal.   Neck: Normal range of motion. Neck supple. No JVD present.   Cardiovascular: Normal rate, regular rhythm and normal heart sounds.    Pulmonary/Chest: Effort normal and breath sounds normal. No respiratory distress. She has no wheezes. She has no rales.   PM in left chest wall.   Abdominal: Soft. Bowel sounds are normal. She exhibits no distension. There is no tenderness.   Musculoskeletal: Normal range of motion. She exhibits no edema.   Neurological: She is alert and oriented to person, place, and time.   Skin: Skin is warm and dry. No rash noted.   Psychiatric: She has a normal mood and affect.     PM is working normally - no mode switching episodes at all.    Lab Results   Component Value Date/Time    CHOLSTRLTOT 170 05/03/2017 02:34 PM    LDL 73 05/03/2017 02:34 PM    HDL 72 05/03/2017 02:34 PM    TRIGLYCERIDE 126 05/03/2017 02:34 PM       Lab Results   Component Value Date/Time    SODIUM 139 05/03/2017 02:34 PM    POTASSIUM 4.0 05/03/2017 02:34 PM    CHLORIDE 104 05/03/2017 02:34 PM    CO2 26 05/03/2017 02:34 PM    GLUCOSE 84 05/03/2017 02:34 PM    BUN 25 (H) 05/03/2017 02:34 PM    CREATININE 0.94 05/03/2017 02:34 PM     Lab Results   Component Value Date/Time    ALKPHOSPHAT 72 05/03/2017 02:34 PM    ASTSGOT 16 05/03/2017 02:34 PM    ALTSGPT 16 05/03/2017 02:34 PM    TBILIRUBIN 0.6 05/03/2017 02:34 PM        Assessment:     1. Cardiac pacemaker in situ     2. SSS (sick sinus syndrome) ([facility])     3. PAF (paroxysmal atrial fibrillation) ([facility])  CBC " WITH DIFFERENTIAL    TSH   4. Mixed hyperlipidemia  CBC WITH DIFFERENTIAL    COMP METABOLIC PANEL    LIPID PROFILE       Medical Decision Making:  Today's Assessment / Status / Plan:     1. Sick sinus syndrome with paroxysmal atrial fibrillation, with permanent pacemaker. The device is working normally, with no mode switching episodes at all. She remains on Flecainide, and ASA 81mg once daily.    2. Hyperlipidemia, treated. To check fasting CMP and lipid panel.    Same medications for now. FU in 6 months with Dr. ELGIN Patel to establish care, with PM check on same day. FU sooner if clinical condition changes.    Collaborating MD: WEN Patel

## 2018-07-31 NOTE — LETTER
[facility]   [address]   [address]   [place] [address]  Phone: [phone]  Fax: [phone]              Simona Jensen  1950    Encounter Date: 7/31/2018    JUSTIN Levine          PROGRESS NOTE:  Chief Complaint   Patient presents with   • Follow-Up   • Pacemaker Check/Dysfunction   • HTN (Controlled)   • Hyperlipidemia       Subjective:   Simona Jensen is a 67 y.o. female who presents today for six month follow-up for PM check, paroxysmal atrial fibrillation and hyperlipidemia.    Simona is a 67 year old female with history of sick sinus syndrome with pacemaker since June 2010, paroxysmal atrial fibrillation on Flecainide, and hyperlipidemia, normally followed by Dr. Malagon.    Since the last visit, she has been stable. No symptomatic palpitations or fluttering of her heart; no chest pain, pressure or discomfort; no shortness of breath, orthopnea or PND; no dizziness or syncope; no edema. BP has been stable. She has put some weight back on, after losing 25+ pounds on Weight Watchers last year.    Past Medical History:   Diagnosis Date   • Cataract     OU   • Hyperlipidemia    • Menopause    • Other specified disorder of intestines     Divertuculosis   • Pacemaker June 2010   • Paroxysmal atrial fibrillation (HCC)     Treated with Flecainide   • SSS (sick sinus syndrome) (HCC)      Past Surgical History:   Procedure Laterality Date   • BREAST BIOPSY  4/28/2014    Performed by Alex Cruz M.D. at SURGERY SAME DAY [facility] ORS   • LOW ANTERIOR RESECTION ROBOTIC  5/28/2013    Performed by Prashant Almanzar M.D. at SURGERY [facility] ORS   • PACEMAKER INSERTION  June 2010    [place] [employer] Altrua 60 [id number] implanted by Dr. Marte, [facility].   • ABDOMINAL HYSTERECTOMY TOTAL      still has ovaries   • HYSTERECTOMY, TOTAL ABDOMINAL     • PACEMAKER INSERTION       Family History   Problem Relation Age of  Onset   • Arrythmia Mother    • Diabetes Mother    • Cancer Mother         breast, lung   • Hypertension Mother    • Hyperlipidemia Mother    • Arrythmia Brother    • Diabetes Brother    • Hypertension Brother    • Cancer Brother         Prsotate CA   • Heart Disease Brother    • Heart Disease Father    • Cancer Maternal Aunt         breast     Social History     Social History   • Marital status:      Spouse name: N/A   • Number of children: N/A   • Years of education: N/A     Occupational History   • Not on file.     Social History Main Topics   • Smoking status: Never Smoker   • Smokeless tobacco: Never Used   • Alcohol use 0.5 oz/week     1 Glasses of wine per week      Comment: social, 1 per week   • Drug use: No   • Sexual activity: Yes     Partners: Male     Other Topics Concern   • Not on file     Social History Narrative   • No narrative on file     Allergies   Allergen Reactions   • Nkda [No Known Drug Allergy]      Outpatient Encounter Prescriptions as of 7/31/2018   Medication Sig Dispense Refill   • flecainide (TAMBOCOR) 100 MG Tab TAKE 1 TABLET TWICE DAILY 180 Tab 2   • traZODone (DESYREL) 100 MG Tab TAKE 1 TABLET EVERY NIGHT AT BEDTIME (SUBSTITUTED FOR  DESYREL) 90 Tab 0   • [DISCONTINUED] MethylPREDNISolone (MEDROL DOSEPAK) 4 MG Tablet Therapy Pack As directed on the packaging label. (Patient not taking: Reported on 7/31/2018) 21 Tab 0   • [DISCONTINUED] tizanidine (ZANAFLEX) 4 MG Tab Take 1 Tab by mouth every 6 hours as needed. (Patient not taking: Reported on 7/31/2018) 16 Tab 0   • simvastatin (ZOCOR) 40 MG Tab TAKE 1 TABLET EVERY EVENING 90 Tab 3   • Cholecalciferol (VITAMIN D3) 2000 UNIT Cap Take  by mouth.     • aspirin EC (ECOTRIN) 81 MG TBEC Take 81 mg by mouth every day.     • MULTIPLE VITAMINS PO Take 1 Tab by mouth every day.       No facility-administered encounter medications on file as of 7/31/2018.      Review of Systems   Constitutional: Negative for chills and fever.   HENT:  "Negative for congestion.    Respiratory: Negative for cough and shortness of breath.    Cardiovascular: Negative for chest pain, palpitations, orthopnea, leg swelling and PND.   Gastrointestinal: Negative for abdominal pain and nausea.   Musculoskeletal: Negative for myalgias.   Skin: Negative for rash.   Neurological: Negative for dizziness, loss of consciousness and headaches.   Endo/Heme/Allergies: Does not bruise/bleed easily.        Objective:   /82   Pulse 60   Ht 1.676 m (5' 6\")   Wt 72.1 kg (159 lb)   SpO2 93%   BMI 25.66 kg/m²      Physical Exam   Constitutional: She is oriented to person, place, and time. She appears well-developed and well-nourished.   Weight is up 8 pounds.   HENT:   Head: Normocephalic.   Eyes: EOM are normal.   Neck: Normal range of motion. Neck supple. No JVD present.   Cardiovascular: Normal rate, regular rhythm and normal heart sounds.    Pulmonary/Chest: Effort normal and breath sounds normal. No respiratory distress. She has no wheezes. She has no rales.   PM in left chest wall.   Abdominal: Soft. Bowel sounds are normal. She exhibits no distension. There is no tenderness.   Musculoskeletal: Normal range of motion. She exhibits no edema.   Neurological: She is alert and oriented to person, place, and time.   Skin: Skin is warm and dry. No rash noted.   Psychiatric: She has a normal mood and affect.     PM is working normally - no mode switching episodes at all.    Lab Results   Component Value Date/Time    CHOLSTRLTOT 170 05/03/2017 02:34 PM    LDL 73 05/03/2017 02:34 PM    HDL 72 05/03/2017 02:34 PM    TRIGLYCERIDE 126 05/03/2017 02:34 PM       Lab Results   Component Value Date/Time    SODIUM 139 05/03/2017 02:34 PM    POTASSIUM 4.0 05/03/2017 02:34 PM    CHLORIDE 104 05/03/2017 02:34 PM    CO2 26 05/03/2017 02:34 PM    GLUCOSE 84 05/03/2017 02:34 PM    BUN 25 (H) 05/03/2017 02:34 PM    CREATININE 0.94 05/03/2017 02:34 PM     Lab Results   Component Value Date/Time    " ALKPHOSPHAT 72 05/03/2017 02:34 PM    ASTSGOT 16 05/03/2017 02:34 PM    ALTSGPT 16 05/03/2017 02:34 PM    TBILIRUBIN 0.6 05/03/2017 02:34 PM        Assessment:     1. Cardiac pacemaker in situ     2. SSS (sick sinus syndrome) (HCC)     3. PAF (paroxysmal atrial fibrillation) (HCC)  CBC WITH DIFFERENTIAL    TSH   4. Mixed hyperlipidemia  CBC WITH DIFFERENTIAL    COMP METABOLIC PANEL    LIPID PROFILE       Medical Decision Making:  Today's Assessment / Status / Plan:     1. Sick sinus syndrome with paroxysmal atrial fibrillation, with permanent pacemaker. The device is working normally, with no mode switching episodes at all. She remains on Flecainide, and ASA 81mg once daily.    2. Hyperlipidemia, treated. To check fasting CMP and lipid panel.    Same medications for now. FU in 6 months with Dr. ELGIN Patel to establish care, with PM check on same day. FU sooner if clinical condition changes.    Collaborating MD: WEN Patel      No Recipients

## 2018-08-07 DIAGNOSIS — G47.00 INSOMNIA, UNSPECIFIED TYPE: ICD-10-CM

## 2018-08-07 RX ORDER — TRAZODONE HYDROCHLORIDE 100 MG/1
TABLET ORAL
Qty: 90 TAB | Refills: 0 | Status: SHIPPED | OUTPATIENT
Start: 2018-08-07 | End: 2018-10-11 | Stop reason: SDUPTHER

## 2018-09-04 ENCOUNTER — HOSPITAL ENCOUNTER (OUTPATIENT)
Dept: LAB | Facility: MEDICAL CENTER | Age: 68
End: 2018-09-04
Attending: NURSE PRACTITIONER
Payer: MEDICARE

## 2018-09-04 DIAGNOSIS — E78.2 MIXED HYPERLIPIDEMIA: ICD-10-CM

## 2018-09-04 DIAGNOSIS — I48.0 PAF (PAROXYSMAL ATRIAL FIBRILLATION) (HCC): ICD-10-CM

## 2018-09-04 LAB
ALBUMIN SERPL BCP-MCNC: 4.4 G/DL (ref 3.2–4.9)
ALBUMIN/GLOB SERPL: 1.5 G/DL
ALP SERPL-CCNC: 75 U/L (ref 30–99)
ALT SERPL-CCNC: 23 U/L (ref 2–50)
ANION GAP SERPL CALC-SCNC: 11 MMOL/L (ref 0–11.9)
AST SERPL-CCNC: 21 U/L (ref 12–45)
BASOPHILS # BLD AUTO: 0.4 % (ref 0–1.8)
BASOPHILS # BLD: 0.03 K/UL (ref 0–0.12)
BILIRUB SERPL-MCNC: 0.6 MG/DL (ref 0.1–1.5)
BUN SERPL-MCNC: 20 MG/DL (ref 8–22)
CALCIUM SERPL-MCNC: 9.4 MG/DL (ref 8.5–10.5)
CHLORIDE SERPL-SCNC: 102 MMOL/L (ref 96–112)
CHOLEST SERPL-MCNC: 213 MG/DL (ref 100–199)
CO2 SERPL-SCNC: 27 MMOL/L (ref 20–33)
CREAT SERPL-MCNC: 0.95 MG/DL (ref 0.5–1.4)
EOSINOPHIL # BLD AUTO: 0.11 K/UL (ref 0–0.51)
EOSINOPHIL NFR BLD: 1.6 % (ref 0–6.9)
ERYTHROCYTE [DISTWIDTH] IN BLOOD BY AUTOMATED COUNT: 45.5 FL (ref 35.9–50)
GLOBULIN SER CALC-MCNC: 2.9 G/DL (ref 1.9–3.5)
GLUCOSE SERPL-MCNC: 75 MG/DL (ref 65–99)
HCT VFR BLD AUTO: 45.5 % (ref 37–47)
HDLC SERPL-MCNC: 84 MG/DL
HGB BLD-MCNC: 15.3 G/DL (ref 12–16)
IMM GRANULOCYTES # BLD AUTO: 0.02 K/UL (ref 0–0.11)
IMM GRANULOCYTES NFR BLD AUTO: 0.3 % (ref 0–0.9)
LDLC SERPL CALC-MCNC: 99 MG/DL
LYMPHOCYTES # BLD AUTO: 1.92 K/UL (ref 1–4.8)
LYMPHOCYTES NFR BLD: 28.2 % (ref 22–41)
MCH RBC QN AUTO: 32.1 PG (ref 27–33)
MCHC RBC AUTO-ENTMCNC: 33.6 G/DL (ref 33.6–35)
MCV RBC AUTO: 95.6 FL (ref 81.4–97.8)
MONOCYTES # BLD AUTO: 0.55 K/UL (ref 0–0.85)
MONOCYTES NFR BLD AUTO: 8.1 % (ref 0–13.4)
NEUTROPHILS # BLD AUTO: 4.18 K/UL (ref 2–7.15)
NEUTROPHILS NFR BLD: 61.4 % (ref 44–72)
NRBC # BLD AUTO: 0 K/UL
NRBC BLD-RTO: 0 /100 WBC
PLATELET # BLD AUTO: 272 K/UL (ref 164–446)
PMV BLD AUTO: 11 FL (ref 9–12.9)
POTASSIUM SERPL-SCNC: 4.3 MMOL/L (ref 3.6–5.5)
PROT SERPL-MCNC: 7.3 G/DL (ref 6–8.2)
RBC # BLD AUTO: 4.76 M/UL (ref 4.2–5.4)
SODIUM SERPL-SCNC: 140 MMOL/L (ref 135–145)
TRIGL SERPL-MCNC: 150 MG/DL (ref 0–149)
TSH SERPL DL<=0.005 MIU/L-ACNC: 0.56 UIU/ML (ref 0.38–5.33)
WBC # BLD AUTO: 6.8 K/UL (ref 4.8–10.8)

## 2018-09-04 PROCEDURE — 84443 ASSAY THYROID STIM HORMONE: CPT

## 2018-09-04 PROCEDURE — 80053 COMPREHEN METABOLIC PANEL: CPT

## 2018-09-04 PROCEDURE — 85025 COMPLETE CBC W/AUTO DIFF WBC: CPT

## 2018-09-04 PROCEDURE — 80061 LIPID PANEL: CPT

## 2018-09-04 PROCEDURE — 36415 COLL VENOUS BLD VENIPUNCTURE: CPT

## 2018-09-13 ENCOUNTER — OFFICE VISIT (OUTPATIENT)
Dept: MEDICAL GROUP | Facility: PHYSICIAN GROUP | Age: 68
End: 2018-09-13
Payer: MEDICARE

## 2018-09-13 VITALS
HEIGHT: 66 IN | BODY MASS INDEX: 26.52 KG/M2 | TEMPERATURE: 98.2 F | OXYGEN SATURATION: 95 % | HEART RATE: 66 BPM | RESPIRATION RATE: 14 BRPM | WEIGHT: 165 LBS | DIASTOLIC BLOOD PRESSURE: 62 MMHG | SYSTOLIC BLOOD PRESSURE: 112 MMHG

## 2018-09-13 DIAGNOSIS — E78.2 MIXED HYPERLIPIDEMIA: ICD-10-CM

## 2018-09-13 DIAGNOSIS — I49.5 SSS (SICK SINUS SYNDROME) (HCC): ICD-10-CM

## 2018-09-13 DIAGNOSIS — Z95.0 CARDIAC PACEMAKER IN SITU: ICD-10-CM

## 2018-09-13 DIAGNOSIS — Z23 NEED FOR VACCINATION: ICD-10-CM

## 2018-09-13 DIAGNOSIS — G47.00 INSOMNIA, UNSPECIFIED TYPE: ICD-10-CM

## 2018-09-13 DIAGNOSIS — E55.9 VITAMIN D INSUFFICIENCY: ICD-10-CM

## 2018-09-13 DIAGNOSIS — I48.0 PAF (PAROXYSMAL ATRIAL FIBRILLATION) (HCC): ICD-10-CM

## 2018-09-13 PROCEDURE — 99214 OFFICE O/P EST MOD 30 MIN: CPT | Mod: 25 | Performed by: INTERNAL MEDICINE

## 2018-09-13 PROCEDURE — G0008 ADMIN INFLUENZA VIRUS VAC: HCPCS | Performed by: INTERNAL MEDICINE

## 2018-09-13 PROCEDURE — 90662 IIV NO PRSV INCREASED AG IM: CPT | Performed by: INTERNAL MEDICINE

## 2018-09-13 ASSESSMENT — PATIENT HEALTH QUESTIONNAIRE - PHQ9: CLINICAL INTERPRETATION OF PHQ2 SCORE: 0

## 2018-09-13 NOTE — ASSESSMENT & PLAN NOTE
Persistent, stable. She is on flecainide. No side effects. She charbel asa for anticoagulation. She is seeing cardiology. CHADVSC2 score 2

## 2018-09-13 NOTE — ASSESSMENT & PLAN NOTE
She was seeing at cardiology clinic. Pacemaker working great. No major event. She is due for battery change next year.  She denies chest pain, shortness of breath, leg swelling, blurred vision, lightheadedness, palpitation, orthopnea,, melanotic stool, hematuria, abdomina pain or diarrhea

## 2018-09-13 NOTE — PROGRESS NOTES
Subjective:   Simona Jensen is a 67 y.o. female here today for routine follow-up, lab work review, hyperlipidemia    Cardiac pacemaker in situ  She was seeing at cardiology clinic. Pacemaker working great. No major event. She is due for battery change next year.  She denies chest pain, shortness of breath, leg swelling, blurred vision, lightheadedness, palpitation, orthopnea,, melanotic stool, hematuria, abdomina pain or diarrhea    SSS (sick sinus syndrome)  As per above s/p pacemaker placement     PAF (paroxysmal atrial fibrillation)  Persistent, stable. She is on flecainide. No side effects. She charbel asa for anticoagulation. She is seeing cardiology. CHADVSC2 score 2    Hyperlipidemia  Triglycerides slightly elevated due to weight gain.  She is on simvastatin 40 mg daily for primary prevention.  No side effects from current regimen.  Continue to monitor    Insomnia  Chronic. She has trouble falling and staying in sleep. Trazodone helps. She takes it every night. She denies apneic episodes     Vitamin D insufficiency  Last lab work 09/2018. Well managed. Continue supplement        Current medicines (including changes today)  Current Outpatient Prescriptions   Medication Sig Dispense Refill   • traZODone (DESYREL) 100 MG Tab TAKE 1 TABLET EVERY NIGHT AT BEDTIME (SUBSTITUTED FOR  DESYREL) 90 Tab 0   • flecainide (TAMBOCOR) 100 MG Tab TAKE 1 TABLET TWICE DAILY 180 Tab 2   • simvastatin (ZOCOR) 40 MG Tab TAKE 1 TABLET EVERY EVENING 90 Tab 3   • Cholecalciferol (VITAMIN D3) 2000 UNIT Cap Take  by mouth.     • aspirin EC (ECOTRIN) 81 MG TBEC Take 81 mg by mouth every day.     • MULTIPLE VITAMINS PO Take 1 Tab by mouth every day.       No current facility-administered medications for this visit.      She  has a past medical history of Cataract; Hyperlipidemia; Menopause; Other specified disorder of intestines; Pacemaker (June 2010); Paroxysmal atrial fibrillation (HCC); and SSS (sick sinus syndrome) ([facility]). She  also has no past medical history of Breast cancer (HCC).    Current Outpatient Prescriptions   Medication Sig Dispense Refill   • traZODone (DESYREL) 100 MG Tab TAKE 1 TABLET EVERY NIGHT AT BEDTIME (SUBSTITUTED FOR  DESYREL) 90 Tab 0   • flecainide (TAMBOCOR) 100 MG Tab TAKE 1 TABLET TWICE DAILY 180 Tab 2   • simvastatin (ZOCOR) 40 MG Tab TAKE 1 TABLET EVERY EVENING 90 Tab 3   • Cholecalciferol (VITAMIN D3) 2000 UNIT Cap Take  by mouth.     • aspirin EC (ECOTRIN) 81 MG TBEC Take 81 mg by mouth every day.     • MULTIPLE VITAMINS PO Take 1 Tab by mouth every day.       No current facility-administered medications for this visit.        Allergies as of 09/13/2018 - Reviewed 09/13/2018   Allergen Reaction Noted   • Nkda [no known drug allergy]  07/20/2012       Social History     Social History   • Marital status:      Spouse name: N/A   • Number of children: N/A   • Years of education: N/A     Occupational History   • Not on file.     Social History Main Topics   • Smoking status: Never Smoker   • Smokeless tobacco: Never Used   • Alcohol use 0.5 oz/week     1 Glasses of wine per week      Comment: social, 1 per week   • Drug use: No   • Sexual activity: Yes     Partners: Male     Other Topics Concern   • Not on file     Social History Narrative   • No narrative on file        Family History   Problem Relation Age of Onset   • Arrythmia Mother    • Diabetes Mother    • Cancer Mother         breast, lung   • Hypertension Mother    • Hyperlipidemia Mother    • Arrythmia Brother    • Diabetes Brother    • Hypertension Brother    • Cancer Brother         Prsotate CA   • Heart Disease Brother    • Heart Disease Father    • Cancer Maternal Aunt         breast       Past Surgical History:   Procedure Laterality Date   • BREAST BIOPSY  4/28/2014    Performed by Alex Cruz M.D. at SURGERY SAME DAY [facility]   • LOW ANTERIOR RESECTION ROBOTIC  5/28/2013    Performed by Prashant Almanzar M.D. at SURGERY [facility]  "ORS   • PACEMAKER INSERTION  June 2010    [place] [employer] Altrua 60 S606 implanted by Dr. Marte, [facility].   • ABDOMINAL HYSTERECTOMY TOTAL      still has ovaries   • HYSTERECTOMY, TOTAL ABDOMINAL     • PACEMAKER INSERTION         ROS   All systems reviewed are negative except for HPI       Objective:     Blood pressure 112/62, pulse 66, temperature 36.8 °C (98.2 °F), resp. rate 14, height 1.676 m (5' 6\"), weight 74.8 kg (165 lb), SpO2 95 %, not currently breastfeeding. Body mass index is 26.63 kg/m².   Physical Exam:  Constitutional: Alert, no distress.  Skin: Warm, dry, good turgor, no rashes in visible areas.  Eye: Equal, round and reactive, conjunctiva clear, lids normal.  ENMT: Lips without lesions, good dentition, oropharynx clear.  Neck: Trachea midline, no masses, no thyromegaly. No cervical or supraclavicular lymphadenopathy  Respiratory: Unlabored respiratory effort, lungs clear to auscultation, no wheezes, no ronchi.  Cardiovascular: irregular S1, S2, no murmur, no edema.  Abdomen: Soft, non-tender, no masses, no hepatosplenomegaly.  Psych: Alert and oriented x3, normal affect and mood.        Assessment and Plan:   The following treatment plan was discussed    1. Need for vaccination  - INFLUENZA VACCINE, HIGH DOSE (65+ ONLY)    2. Cardiac pacemaker in situ  3. SSS (sick sinus syndrome) (HCC)  4. PAF (paroxysmal atrial fibrillation) (HCC)  Continue same treatment.  Continue ASA.  Consider anticoagulation with cardiology. Continue to monitor     5. Mixed hyperlipidemia  Continue same treatment.  Continue to monitor    6. Insomnia, unspecified type  Continue trazodone    7. Vitamin D insufficiency  Continue supplements.  Continue to monitor      Followup: Return in about 1 year (around 9/13/2019), or if symptoms worsen or fail to improve, for Short, annual exam.         "

## 2018-10-11 DIAGNOSIS — G47.00 INSOMNIA, UNSPECIFIED TYPE: ICD-10-CM

## 2018-10-11 RX ORDER — TRAZODONE HYDROCHLORIDE 100 MG/1
TABLET ORAL
Qty: 90 TAB | Refills: 3 | Status: SHIPPED | OUTPATIENT
Start: 2018-10-11 | End: 2018-12-20 | Stop reason: SDUPTHER

## 2018-10-17 RX ORDER — SIMVASTATIN 40 MG
TABLET ORAL
Qty: 90 TAB | Refills: 3 | Status: SHIPPED | OUTPATIENT
Start: 2018-10-17 | End: 2018-12-20 | Stop reason: SDUPTHER

## 2018-12-31 ENCOUNTER — HOSPITAL ENCOUNTER (OUTPATIENT)
Dept: RADIOLOGY | Facility: MEDICAL CENTER | Age: 68
End: 2018-12-31
Attending: INTERNAL MEDICINE
Payer: MEDICARE

## 2018-12-31 DIAGNOSIS — Z12.31 SCREENING MAMMOGRAM, ENCOUNTER FOR: ICD-10-CM

## 2018-12-31 PROCEDURE — 77063 BREAST TOMOSYNTHESIS BI: CPT

## 2019-02-13 ENCOUNTER — NON-PROVIDER VISIT (OUTPATIENT)
Dept: CARDIOLOGY | Facility: MEDICAL CENTER | Age: 69
End: 2019-02-13
Payer: MEDICARE

## 2019-02-13 ENCOUNTER — OFFICE VISIT (OUTPATIENT)
Dept: CARDIOLOGY | Facility: MEDICAL CENTER | Age: 69
End: 2019-02-13
Payer: MEDICARE

## 2019-02-13 VITALS
BODY MASS INDEX: 26.36 KG/M2 | WEIGHT: 164 LBS | HEART RATE: 66 BPM | OXYGEN SATURATION: 96 % | DIASTOLIC BLOOD PRESSURE: 80 MMHG | SYSTOLIC BLOOD PRESSURE: 120 MMHG | HEIGHT: 66 IN

## 2019-02-13 DIAGNOSIS — Z91.89 OTHER SPECIFIED PERSONAL RISK FACTORS, NOT ELSEWHERE CLASSIFIED: ICD-10-CM

## 2019-02-13 DIAGNOSIS — Z95.0 CARDIAC PACEMAKER IN SITU: ICD-10-CM

## 2019-02-13 DIAGNOSIS — E78.5 DYSLIPIDEMIA: ICD-10-CM

## 2019-02-13 DIAGNOSIS — I48.0 PAF (PAROXYSMAL ATRIAL FIBRILLATION) (HCC): ICD-10-CM

## 2019-02-13 DIAGNOSIS — I49.5 SSS (SICK SINUS SYNDROME) (HCC): Primary | ICD-10-CM

## 2019-02-13 DIAGNOSIS — I49.5 SSS (SICK SINUS SYNDROME) (HCC): ICD-10-CM

## 2019-02-13 PROCEDURE — 99214 OFFICE O/P EST MOD 30 MIN: CPT | Performed by: INTERNAL MEDICINE

## 2019-02-13 PROCEDURE — 93280 PM DEVICE PROGR EVAL DUAL: CPT | Performed by: INTERNAL MEDICINE

## 2019-02-13 ASSESSMENT — ENCOUNTER SYMPTOMS
CONSTIPATION: 1
DIARRHEA: 1
INSOMNIA: 1
DIZZINESS: 1

## 2019-02-13 NOTE — LETTER
Name:          Simona Jensen   YOB: 1950  Date:     02/13/2019      Juan Sanchez M.D.  [address] [facility] [address]  [facility] [address]     Micky Patel MD  [address] , [facility] [address]  [place] [address]  Phone: [phone]  Back Line: [phone]  Fax: [phone]  E-mail: [email or web address]   Dear Dr. Sanchez,    We had the pleasure of seeing your patient, Simona Jensen, in Cardiology Clinic at [facility] today.    As you know, she is a 68-year-old woman with sick sinus syndrome status post pacemaker implantation ([place], 6/2010), paroxysmal atrial fibrillation rhythm controlled with flecainide. She is also followed in cardiology for dyslipidemia on simvastatin.    She continues to do very well and has no mode switching with flecainide at 100 mg p.o. twice daily.  I discussed with her the theoretical risk of unopposed flecainide with rapid atrial fibrillation off of cuate blockers.  However, since she has had no episodes of mode switching for quite some time I have not at this time elected to place her on a cuate agent (such as metoprolol, diltiazem, or digoxin).    We also discussed her stroke risk on the basis of her chads 2 vascular score with risk factors including age, and female gender, she has a 2% annual risk of stroke.  However, given a complete lack of mode switching on device interrogation she certainly has a lower risk of stroke than that.  I discussed with her that the guidelines have not reflected lack of mode switching to decrease stroke risk though studies have certainly validated that.  At this time, I have not recommended the use of an oral anticoagulant after careful discussion with her in that way.  I did let her know that should there be any mode switching whatsoever I would probably advocate for an oral anticoagulant at that time.    Finally, to exclude developing or obstructive coronary artery disease in light of her dyslipidemia I did order a CT.   Certainly, if her score is above 400 then I would probably have to take her off of flecainide.  We will discuss that at our next follow-up.    Return in about 3 months (around 5/13/2019).    Thank you for the referral and please do not hesitate to contact me at any time. My contact information is listed above.    This note was dictated using Dragon speech recognition software.     A full note including my physical examination and a full list of rectified medications is available in our medical record, and can be faxed as well.    Micky Patel MD  Cardiologist  [facility]

## 2019-02-13 NOTE — PROGRESS NOTES
Chief Complaint   Patient presents with   • Hypertension     Previous patient       Subjective:   Simona Jensen is a 68 -year-old woman with sick sinus syndrome status post pacemaker implantation ([employer], 6/2010), paroxysmal atrial fibrillation rhythm controlled with flecainide. She is also followed in cardiology for dyslipidemia on simvastatin.    She is doing quite well today, and has no cardiovascular complaints.  Her blood pressure at home is very well controlled as it is here in the office.  She tells me that her home weight is about 154 pounds.  She comes in with her , both are 100 and pleasant.    She had device interrogation today, and no mode switching has been seen nor has any mode switching been seen for quite some time in conjunction with her history of paroxysmal atrial fibrillation.    After seeing her, I did not see an echocardiogram ordered recently.    Her [employer] pacemaker was implanted in [facility] in June 2010 as per the problem list and past medical history.    Past Medical History:   Diagnosis Date   • Cardiac pacemaker in situ 1/25/2012 June 2010: [employer] Altrua 60 S606 implanted by Dr. Marte, [facility].    • Cataract     OU   • Hyperlipidemia    • Menopause    • Other specified disorder of intestines     Divertuculosis   • Pacemaker June 2010   • Paroxysmal atrial fibrillation (HCC)     Treated with Flecainide   • SSS (sick sinus syndrome) (HCC)      Past Surgical History:   Procedure Laterality Date   • BREAST BIOPSY  4/28/2014    Performed by Alex Cruz M.D. at SURGERY SAME DAY [facility]   • LOW ANTERIOR RESECTION ROBOTIC  5/28/2013    Performed by Prashant Almanzar M.D. at SURGERY [facility] ORS   • PACEMAKER INSERTION  June 2010    [employer] Altrua 60 S606 implanted by Dr. Marte, [facility].   • ABDOMINAL HYSTERECTOMY TOTAL      still has ovaries   • HYSTERECTOMY, TOTAL ABDOMINAL     •  PACEMAKER INSERTION       Family History   Problem Relation Age of Onset   • Arrythmia Mother    • Diabetes Mother    • Cancer Mother         breast, lung   • Hypertension Mother    • Hyperlipidemia Mother    • Arrythmia Brother    • Diabetes Brother    • Hypertension Brother    • Cancer Brother         Prsotate CA   • Heart Disease Brother    • Heart Disease Father    • Cancer Maternal Aunt         breast     Social History     Social History   • Marital status:      Spouse name: N/A   • Number of children: N/A   • Years of education: N/A     Occupational History   • Not on file.     Social History Main Topics   • Smoking status: Never Smoker   • Smokeless tobacco: Never Used   • Alcohol use 0.5 oz/week     1 Glasses of wine per week      Comment: social, 1 per week   • Drug use: No   • Sexual activity: Yes     Partners: Male     Other Topics Concern   • Not on file     Social History Narrative   • No narrative on file     Allergies   Allergen Reactions   • Nkda [No Known Drug Allergy]      Outpatient Encounter Prescriptions as of 2/13/2019   Medication Sig Dispense Refill   • Cholecalciferol (VITAMIN D3) 2000 UNIT Cap Take 1 Cap by mouth every day. 30 Cap 0   • flecainide (TAMBOCOR) 100 MG Tab Take 1 Tab by mouth 2 times a day. 180 Tab 2   • simvastatin (ZOCOR) 40 MG Tab Take 1 Tab by mouth every evening. 90 Tab 3   • traZODone (DESYREL) 100 MG Tab Take 1 Tab by mouth at bedtime as needed for Sleep. 90 Tab 3   • aspirin EC (ECOTRIN) 81 MG TBEC Take 81 mg by mouth every day.     • MULTIPLE VITAMINS PO Take 1 Tab by mouth every day.       No facility-administered encounter medications on file as of 2/13/2019.      Review of Systems   Gastrointestinal: Positive for constipation and diarrhea.   Musculoskeletal: Positive for joint pain.   Neurological: Positive for dizziness.   Psychiatric/Behavioral: The patient has insomnia.    All other systems reviewed and are negative.       Objective:   /80 (BP  "Location: Left arm, Patient Position: Sitting, BP Cuff Size: Adult)   Pulse 66   Ht 1.676 m (5' 6\")   Wt 74.4 kg (164 lb)   SpO2 96%   BMI 26.47 kg/m²     Physical Exam   Constitutional: She is oriented to person, place, and time. She appears well-developed and well-nourished. No distress.   Pleasant, in no distress   Eyes: Pupils are equal, round, and reactive to light. EOM are normal. No scleral icterus.   Neck: No JVD present.   Cardiovascular: Normal rate, regular rhythm, normal heart sounds and intact distal pulses.  Exam reveals no gallop and no friction rub.    No murmur heard.  No carotid bruits.  Pacemaker generator noted in left upper chest without evidence of surrounding infection   Pulmonary/Chest: Effort normal and breath sounds normal. No respiratory distress. She has no wheezes. She has no rales.   Abdominal: Soft. Bowel sounds are normal. She exhibits no distension.   Musculoskeletal: She exhibits no edema (No lower extremity edema bilaterally).   Neurological: She is alert and oriented to person, place, and time.   Skin: Skin is warm and dry. No rash noted. She is not diaphoretic. No erythema. No pallor.   Psychiatric: She has a normal mood and affect. Judgment and thought content normal.   Nursing note and vitals reviewed.    Lab Results   Component Value Date/Time    WBC 6.8 09/04/2018 12:45 PM    RBC 4.76 09/04/2018 12:45 PM    HEMOGLOBIN 15.3 09/04/2018 12:45 PM    HEMATOCRIT 45.5 09/04/2018 12:45 PM    MCV 95.6 09/04/2018 12:45 PM    MCH 32.1 09/04/2018 12:45 PM    MCHC 33.6 09/04/2018 12:45 PM    MPV 11.0 09/04/2018 12:45 PM        Lab Results   Component Value Date/Time    SODIUM 140 09/04/2018 12:45 PM    POTASSIUM 4.3 09/04/2018 12:45 PM    CHLORIDE 102 09/04/2018 12:45 PM    CO2 27 09/04/2018 12:45 PM    GLUCOSE 75 09/04/2018 12:45 PM    BUN 20 09/04/2018 12:45 PM    CREATININE 0.95 09/04/2018 12:45 PM        Lab Results   Component Value Date/Time    ASTSGOT 21 09/04/2018 12:45 PM    " ALTSGPT 23 09/04/2018 12:45 PM        Lab Results   Component Value Date/Time    CHOLSTRLTOT 213 (H) 09/04/2018 12:45 PM    LDL 99 09/04/2018 12:45 PM    HDL 84 09/04/2018 12:45 PM    TRIGLYCERIDE 150 (H) 09/04/2018 12:45 PM         No results found for this or any previous visit.        Assessment:     1. SSS (sick sinus syndrome) ([facility])     2. Other specified personal risk factors, not elsewhere classified  CT-CARDIAC SCORING   3. PAF (paroxysmal atrial fibrillation) ([facility])     4. Cardiac pacemaker in situ     5. Dyslipidemia  CT-CARDIAC SCORING       Medical Decision Making:  Today's Assessment / Status / Plan:     She continues to do very well and has no mode switching with flecainide at 100 mg p.o. twice daily.  I discussed with her the theoretical risk of unopposed flecainide with rapid atrial fibrillation off of cuate blockers.  However, since she has had no episodes of mode switching for quite some time I have not at this time elected to place her on a cuate agent (such as metoprolol, diltiazem, or digoxin).    We also discussed her stroke risk on the basis of her chads 2 vascular score with risk factors including age, and female gender, she has a 2% annual risk of stroke.  However, given a complete lack of mode switching on device interrogation she certainly has a lower risk of stroke than that.  I discussed with her that the guidelines have not reflected lack of mode switching to decrease stroke risk though studies have certainly validated that.  At this time, I have not recommended the use of an oral anticoagulant after careful discussion with her in that way.  I did let her know that should there be any mode switching whatsoever I would probably advocate for an oral anticoagulant at that time.    Finally, to exclude developing or obstructive coronary artery disease in light of her dyslipidemia I did order a CT.  Certainly, if her score is above 400 then I would probably have to take her off of flecainide.   We will discuss that at our next follow-up.    Micky Patel MD  Cardiologist, [facility] [place]     Return in about 3 months (around 5/13/2019).    I spent 45 minutes with Ms. Simona Jensen, over fifty percent was spent counseling the patient on their condition, best management practices, reviewing test results, risks and benefits of treatment and coordinating care.

## 2019-02-21 ENCOUNTER — HOSPITAL ENCOUNTER (OUTPATIENT)
Dept: RADIOLOGY | Facility: MEDICAL CENTER | Age: 69
End: 2019-02-21
Attending: INTERNAL MEDICINE
Payer: COMMERCIAL

## 2019-02-21 DIAGNOSIS — E78.5 DYSLIPIDEMIA: ICD-10-CM

## 2019-02-21 DIAGNOSIS — Z91.89 OTHER SPECIFIED PERSONAL RISK FACTORS, NOT ELSEWHERE CLASSIFIED: ICD-10-CM

## 2019-02-21 PROCEDURE — 4410556 CT-CARDIAC SCORING

## 2019-02-26 ENCOUNTER — TELEPHONE (OUTPATIENT)
Dept: CARDIOLOGY | Facility: MEDICAL CENTER | Age: 69
End: 2019-02-26

## 2019-02-26 NOTE — TELEPHONE ENCOUNTER
CT-CARDIAC SCORING    Notes recorded by Micky Patel M.D. on 2/26/2019 at 8:35 AM PST  Very little coronary calcification confirms no major blockages related to which flecainide continues to be safe     Called pt and notified, pt verbalizes understanding

## 2019-03-24 ENCOUNTER — APPOINTMENT (OUTPATIENT)
Dept: RADIOLOGY | Facility: IMAGING CENTER | Age: 69
End: 2019-03-24
Attending: FAMILY MEDICINE
Payer: MEDICARE

## 2019-03-24 ENCOUNTER — OFFICE VISIT (OUTPATIENT)
Dept: URGENT CARE | Facility: CLINIC | Age: 69
End: 2019-03-24
Payer: MEDICARE

## 2019-03-24 VITALS
RESPIRATION RATE: 20 BRPM | DIASTOLIC BLOOD PRESSURE: 58 MMHG | SYSTOLIC BLOOD PRESSURE: 110 MMHG | HEART RATE: 76 BPM | HEIGHT: 66 IN | WEIGHT: 147.6 LBS | TEMPERATURE: 98.7 F | BODY MASS INDEX: 23.72 KG/M2 | OXYGEN SATURATION: 92 %

## 2019-03-24 DIAGNOSIS — J22 ACUTE LOWER RESPIRATORY INFECTION: ICD-10-CM

## 2019-03-24 DIAGNOSIS — R05.9 COUGH: ICD-10-CM

## 2019-03-24 DIAGNOSIS — H61.23 BILATERAL IMPACTED CERUMEN: ICD-10-CM

## 2019-03-24 LAB
FLUAV+FLUBV AG SPEC QL IA: NEGATIVE
INT CON NEG: NORMAL
INT CON POS: NORMAL

## 2019-03-24 PROCEDURE — 87804 INFLUENZA ASSAY W/OPTIC: CPT | Performed by: FAMILY MEDICINE

## 2019-03-24 PROCEDURE — 71046 X-RAY EXAM CHEST 2 VIEWS: CPT | Mod: TC | Performed by: FAMILY MEDICINE

## 2019-03-24 PROCEDURE — 99214 OFFICE O/P EST MOD 30 MIN: CPT | Performed by: FAMILY MEDICINE

## 2019-03-24 RX ORDER — DOXYCYCLINE HYCLATE 100 MG/1
CAPSULE ORAL
Qty: 20 CAP | Refills: 0 | Status: SHIPPED | OUTPATIENT
Start: 2019-03-24 | End: 2019-07-16

## 2019-03-24 NOTE — PROGRESS NOTES
Chief Complaint:    Chief Complaint   Patient presents with   • Cough     x5days, cough, chest congestion, chills, sinus, stomach ache       History of Present Illness:     present. This is a new problem. Since 3/20/19, she has chills, nasal symptoms, scratchy throat, and cough. Some diarrhea. No vomiting. Not getting better. No definite sick contacts.      Review of Systems:    Constitutional: See HPI.  Eyes: Negative for change in vision, photophobia, pain, redness, and discharge.  ENT: See HPI.  Respiratory: See HPI.  Cardiovascular: Negative for chest pain.   Gastrointestinal: See HPI.  Genitourinary: Negative for dysuria, urinary urgency, urinary frequency, hematuria, and flank pain.   Musculoskeletal: Negative for myalgias, joint pain, neck pain, and back pain.   Skin: Negative for rash and itching.   Neurological: Negative for dizziness, tingling, tremors, sensory change, speech change, focal weakness, seizures, loss of consciousness, and headaches.   Endo: Negative for polydipsia.   Heme: Does not bruise/bleed easily.   Psychiatric/Behavioral: No new symptoms.      Past Medical History:    Past Medical History:   Diagnosis Date   • Cardiac pacemaker in situ 1/25/2012 June 2010: [employer] Altrua 60 S606 implanted by Dr. Marte, [facility].    • Cataract     OU   • Hyperlipidemia    • Menopause    • Other specified disorder of intestines     Divertuculosis   • Pacemaker June 2010   • Paroxysmal atrial fibrillation (HCC)     Treated with Flecainide   • SSS (sick sinus syndrome) (HCC)      Past Surgical History:    Past Surgical History:   Procedure Laterality Date   • BREAST BIOPSY  4/28/2014    Performed by Alex Cruz M.D. at SURGERY SAME DAY [facility] ORS   • LOW ANTERIOR RESECTION ROBOTIC  5/28/2013    Performed by Prashant Almanzar M.D. at SURGERY [facility] ORS   • PACEMAKER INSERTION  June 2010    [employer] Altrua 60 S606 implanted by Dr. Marte, [facility]  [place].   • ABDOMINAL HYSTERECTOMY TOTAL      still has ovaries   • HYSTERECTOMY, TOTAL ABDOMINAL     • PACEMAKER INSERTION       Social History:    Social History     Social History   • Marital status:      Spouse name: N/A   • Number of children: N/A   • Years of education: N/A     Occupational History   • Not on file.     Social History Main Topics   • Smoking status: Never Smoker   • Smokeless tobacco: Never Used   • Alcohol use 0.5 oz/week     1 Glasses of wine per week      Comment: social, 1 per week   • Drug use: No   • Sexual activity: Yes     Partners: Male     Other Topics Concern   • Not on file     Social History Narrative   • No narrative on file     Family History:    Family History   Problem Relation Age of Onset   • Arrythmia Mother    • Diabetes Mother    • Cancer Mother         breast, lung   • Hypertension Mother    • Hyperlipidemia Mother    • Arrythmia Brother    • Diabetes Brother    • Hypertension Brother    • Cancer Brother         Prsotate CA   • Heart Disease Brother    • Heart Disease Father    • Cancer Maternal Aunt         breast     Medications:    Current Outpatient Prescriptions on File Prior to Visit   Medication Sig Dispense Refill   • Cholecalciferol (VITAMIN D3) 2000 UNIT Cap Take 1 Cap by mouth every day. 30 Cap 0   • flecainide (TAMBOCOR) 100 MG Tab Take 1 Tab by mouth 2 times a day. 180 Tab 2   • simvastatin (ZOCOR) 40 MG Tab Take 1 Tab by mouth every evening. 90 Tab 3   • traZODone (DESYREL) 100 MG Tab Take 1 Tab by mouth at bedtime as needed for Sleep. 90 Tab 3   • aspirin EC (ECOTRIN) 81 MG TBEC Take 81 mg by mouth every day.     • MULTIPLE VITAMINS PO Take 1 Tab by mouth every day.       No current facility-administered medications on file prior to visit.      Allergies:    Allergies   Allergen Reactions   • Nkda [No Known Drug Allergy]        Vitals:    Vitals:    03/24/19 0952   BP: 110/58   BP Location: Left arm   Patient Position: Sitting   BP Cuff Size: Adult  "  Pulse: 76   Resp: 20   Temp: 37.1 °C (98.7 °F)   TempSrc: Temporal   SpO2: 92%   Weight: 67 kg (147 lb 9.6 oz)   Height: 1.676 m (5' 6\")       Physical Exam:    Constitutional: Vital signs reviewed. Appears well-developed and well-nourished. No acute distress.   Eyes: Sclera white, conjunctivae clear.   ENT: Bilateral impacted cerumen. External ears normal. Hearing normal. Nasal mucosa pink. Lips/teeth are normal. Oral mucosa pink and moist. Posterior pharynx: WNL.  Neck: Neck supple.   Cardiovascular: Regular rate and rhythm. No murmur.  Pulmonary/Chest: Respirations non-labored. Clear to auscultation bilaterally.  Lymph: Cervical nodes without tenderness or enlargement.  Musculoskeletal: Normal gait. Normal range of motion. No muscular atrophy or weakness.  Neurological: Alert and oriented to person, place, and time. Muscle tone normal. Coordination normal.   Skin: No rashes or lesions. Warm, dry, normal turgor.  Psychiatric: Normal mood and affect. Behavior is normal. Judgment and thought content normal.     Diagnostics:    DX-CHEST-2 VIEWS (Order #[id number]) on 3/24/19   Narrative       3/24/2019 10:16 AM    HISTORY/REASON FOR EXAM:  Cough    TECHNIQUE/EXAM DESCRIPTION:  PA and lateral views of the chest.    COMPARISON:  None    FINDINGS:    There is a left-sided pacer with leads projecting over the right atrium and right ventricle. The heart is not enlarged. No focal consolidation, pleural effusion or pneumothorax is identified.  Costophrenic angles are clear. Degenerative changes are seen   in the spine.       Impression       No acute cardiopulmonary process is identified.     I personally reviewed the images. Images and Rad report reviewed with them and copy of report to them.    POCT INFLUENZA A/B (Order #[id number]) on 3/24/19   Component Results     Component   Rapid Influenza A-B   Negative    Internal Control Positive   Valid    Internal Control Negative   Valid    Lab Information     Lab   [facility]    Resulting Physician   Najma                Last Resulted Time   Sun Mar 24, 2019 10:37 AM       Assessment / Plan:    1. Bilateral impacted cerumen    2. Cough  - DX-CHEST-2 VIEWS; Future  - POCT Influenza A/B    3. Acute lower respiratory infection  - doxycycline (VIBRAMYCIN) 100 MG Cap; 1 CAP TWICE A DAY X 10 DAYS.  Dispense: 20 Cap; Refill: 0      Discussed with them DDX, management options, and risks, benefits, and alternatives to treatment plan agreed upon.    They prefer antibiotic treatment, understand may be viral condition for which antibiotic won't work, but viral condition will eventually self-resolve.    Agreeable to medication prescribed.    Cerumen successfully flushed from both ears without complication.    After lavage, bilateral ear canals and TMs are WNL.    Discussed expected course of duration, time for improvement, and to seek follow-up in Emergency Room, urgent care, or with PCP if getting worse at any time or not improving within expected time frame.

## 2019-04-09 ENCOUNTER — OFFICE VISIT (OUTPATIENT)
Dept: CARDIOLOGY | Facility: MEDICAL CENTER | Age: 69
End: 2019-04-09
Payer: MEDICARE

## 2019-04-09 VITALS
DIASTOLIC BLOOD PRESSURE: 70 MMHG | HEIGHT: 66 IN | HEART RATE: 66 BPM | SYSTOLIC BLOOD PRESSURE: 102 MMHG | WEIGHT: 147.71 LBS | OXYGEN SATURATION: 95 % | BODY MASS INDEX: 23.74 KG/M2

## 2019-04-09 DIAGNOSIS — Z95.0 CARDIAC PACEMAKER IN SITU: ICD-10-CM

## 2019-04-09 DIAGNOSIS — E78.2 MIXED HYPERLIPIDEMIA: ICD-10-CM

## 2019-04-09 DIAGNOSIS — I48.0 PAF (PAROXYSMAL ATRIAL FIBRILLATION) (HCC): Primary | ICD-10-CM

## 2019-04-09 PROCEDURE — 99214 OFFICE O/P EST MOD 30 MIN: CPT | Performed by: INTERNAL MEDICINE

## 2019-04-09 ASSESSMENT — ENCOUNTER SYMPTOMS
CONSTIPATION: 1
INSOMNIA: 1
DIARRHEA: 1
DIZZINESS: 1

## 2019-04-09 NOTE — LETTER
Name:          Simona Jensen   YOB: 1950  Date:     04/09/2019      Juan Sanchez M.D.  [address] [facility] [address]  [place] [address]     Micky Patel MD  [address] , [address]  [place] [address]  Phone: [phone]  Back Line: [phone]  Fax: [phone]  E-mail: [email or web address]   Dear Dr. Sanchez,    We had the pleasure of seeing your patient, Simona Jensen, in Cardiology Clinic at [facility] today.    As you know, she is a 68-year-old woman with sick sinus syndrome status post pacemaker implantation ([employer], 6/2010), paroxysmal atrial fibrillation rhythm controlled with flecainide. She is also followed in cardiology for dyslipidemia on simvastatin.    She continues to do wonderfully with no episodes of atrial fibrillation seen on device interrogation related to which I do not think that the risk calculator applies to her in terms of stroke risk with atrial fibrillation.  We discussed the recent studies of aspirin, and I noted that those do not apply to her recommending that she continue that medication.  Again, if we had significant mode switching on device checks I would plan to recommend an oral anticoagulant.     I reviewed with her the results of her CT coronary calcium scan with a total calcium score of 5.9.  I discussed with her that in junction with her flecainide use, the medication continues to be exceedingly safe.  Given that she has no mode switching whatsoever, I would wonder whether that medication could be stopped or cut down in the future.  I have made no changes to it at this time.  I would continue diet, exercise, and simvastatin for treatment of her lipids.    Return in about 6 months (around 10/9/2019).    Thank you for the referral and please do not hesitate to contact me at any time. My contact information is listed above.    This note was dictated using Dragon speech recognition software.     A full note including my physical examination  and a full list of rectified medications is available in our medical record, and can be faxed as well.    Micky Patel MD  Cardiologist  [facility]

## 2019-04-09 NOTE — PROGRESS NOTES
Chief Complaint   Patient presents with   • Hypertension       Subjective:   Simona Jensen is a 68 -year-old woman with sick sinus syndrome status post pacemaker implantation ([employer], 6/2010), paroxysmal atrial fibrillation rhythm controlled with flecainide. She is also followed in cardiology for dyslipidemia on simvastatin.    She continues to do quite well, and has no cardiovascular complaints.  She specifically has no palpitations consistent with episodes of atrial fibrillation nor any seen on device interrogation 2 months ago (no mode switching).    She any up with her , both wonderfully pleasant as usual.  She had some questions about recent studies and use around taking aspirin.    Past Medical History:   Diagnosis Date   • Cardiac pacemaker in situ 1/25/2012 June 2010: [employer] Altrua 60 S606 implanted by Dr. Marte, [facility].    • Cataract     OU   • Hyperlipidemia    • Menopause    • Other specified disorder of intestines     Divertuculosis   • Pacemaker June 2010   • Paroxysmal atrial fibrillation (HCC)     Treated with Flecainide   • SSS (sick sinus syndrome) (HCC)      Past Surgical History:   Procedure Laterality Date   • BREAST BIOPSY  4/28/2014    Performed by Alex Cruz M.D. at SURGERY SAME DAY [facility] ORS   • LOW ANTERIOR RESECTION ROBOTIC  5/28/2013    Performed by Prashant Almanzar M.D. at SURGERY [facility] ORS   • PACEMAKER INSERTION  June 2010    [employer] Altrua 60 [id number] implanted by Dr. Marte, [facility].   • ABDOMINAL HYSTERECTOMY TOTAL      still has ovaries   • HYSTERECTOMY, TOTAL ABDOMINAL     • PACEMAKER INSERTION       Family History   Problem Relation Age of Onset   • Arrythmia Mother    • Diabetes Mother    • Cancer Mother         breast, lung   • Hypertension Mother    • Hyperlipidemia Mother    • Arrythmia Brother    • Diabetes Brother    • Hypertension Brother    • Cancer Brother         Prsotate CA   •  "Heart Disease Brother    • Heart Disease Father    • Cancer Maternal Aunt         breast     Social History     Social History   • Marital status:      Spouse name: N/A   • Number of children: N/A   • Years of education: N/A     Occupational History   • Not on file.     Social History Main Topics   • Smoking status: Never Smoker   • Smokeless tobacco: Never Used   • Alcohol use 0.5 oz/week     1 Glasses of wine per week      Comment: social, 1 per week   • Drug use: No   • Sexual activity: Yes     Partners: Male     Other Topics Concern   • Not on file     Social History Narrative   • No narrative on file     Allergies   Allergen Reactions   • Nkda [No Known Drug Allergy]      Outpatient Encounter Prescriptions as of 4/9/2019   Medication Sig Dispense Refill   • Cholecalciferol (VITAMIN D3) 2000 UNIT Cap Take 1 Cap by mouth every day. 30 Cap 0   • flecainide (TAMBOCOR) 100 MG Tab Take 1 Tab by mouth 2 times a day. 180 Tab 2   • simvastatin (ZOCOR) 40 MG Tab Take 1 Tab by mouth every evening. 90 Tab 3   • traZODone (DESYREL) 100 MG Tab Take 1 Tab by mouth at bedtime as needed for Sleep. 90 Tab 3   • aspirin EC (ECOTRIN) 81 MG TBEC Take 81 mg by mouth every day.     • doxycycline (VIBRAMYCIN) 100 MG Cap 1 CAP TWICE A DAY X 10 DAYS. (Patient not taking: Reported on 4/9/2019) 20 Cap 0   • MULTIPLE VITAMINS PO Take 1 Tab by mouth every day.       No facility-administered encounter medications on file as of 4/9/2019.      Review of Systems   Gastrointestinal: Positive for constipation and diarrhea.   Musculoskeletal: Positive for joint pain.   Neurological: Positive for dizziness.   Psychiatric/Behavioral: The patient has insomnia.    All other systems reviewed and are negative.       Objective:   /70 (BP Location: Right arm, Patient Position: Sitting, BP Cuff Size: Adult)   Pulse 66   Ht 1.676 m (5' 6\")   Wt 67 kg (147 lb 11.3 oz)   SpO2 95%   BMI 23.84 kg/m²     Physical Exam   Constitutional: She is " oriented to person, place, and time. She appears well-developed and well-nourished. No distress.   Pleasant, in no distress.  Physical examination is unchanged except where specified compared to my previous on 2/13/2019.   Eyes: Pupils are equal, round, and reactive to light. EOM are normal. No scleral icterus.   Neck: No JVD present.   Cardiovascular: Normal rate, regular rhythm, normal heart sounds and intact distal pulses.  Exam reveals no gallop and no friction rub.    No murmur heard.  No carotid bruits.  Pacemaker generator noted in left upper chest without evidence of surrounding infection   Pulmonary/Chest: Effort normal and breath sounds normal. No respiratory distress. She has no wheezes. She has no rales.   Abdominal: Soft. Bowel sounds are normal. She exhibits no distension.   Musculoskeletal: She exhibits no edema (No lower extremity edema bilaterally).   Neurological: She is alert and oriented to person, place, and time.   Skin: Skin is warm and dry. No rash noted. She is not diaphoretic. No erythema. No pallor.   Psychiatric: She has a normal mood and affect. Judgment and thought content normal.   Nursing note and vitals reviewed.    Lab Results   Component Value Date/Time    WBC 6.8 09/04/2018 12:45 PM    RBC 4.76 09/04/2018 12:45 PM    HEMOGLOBIN 15.3 09/04/2018 12:45 PM    HEMATOCRIT 45.5 09/04/2018 12:45 PM    MCV 95.6 09/04/2018 12:45 PM    MCH 32.1 09/04/2018 12:45 PM    MCHC 33.6 09/04/2018 12:45 PM    MPV 11.0 09/04/2018 12:45 PM        Lab Results   Component Value Date/Time    SODIUM 140 09/04/2018 12:45 PM    POTASSIUM 4.3 09/04/2018 12:45 PM    CHLORIDE 102 09/04/2018 12:45 PM    CO2 27 09/04/2018 12:45 PM    GLUCOSE 75 09/04/2018 12:45 PM    BUN 20 09/04/2018 12:45 PM    CREATININE 0.95 09/04/2018 12:45 PM        Lab Results   Component Value Date/Time    ASTSGOT 21 09/04/2018 12:45 PM    ALTSGPT 23 09/04/2018 12:45 PM        Lab Results   Component Value Date/Time    CHOLSTRLTOT 213 (H)  "09/04/2018 12:45 PM    LDL 99 09/04/2018 12:45 PM    HDL 84 09/04/2018 12:45 PM    TRIGLYCERIDE 150 (H) 09/04/2018 12:45 PM         No results found for this or any previous visit.    Coronary calcium scan, 2/21/2019:  \"Coronary calcification:  LMA - 0.0  LCX - 0.0  LAD - 5.9  RCA - 0.0  Total Calcium Score: 5.9\"    Assessment:     1. PAF (paroxysmal atrial fibrillation) (HCC)     2. Cardiac pacemaker in situ     3. Mixed hyperlipidemia         Medical Decision Making:  Today's Assessment / Status / Plan:     She continues to do wonderfully with no episodes of atrial fibrillation seen on device interrogation related to which I do not think that the risk calculator applies to her in terms of stroke risk with atrial fibrillation.  We discussed the recent studies of aspirin, and I noted that those do not apply to her recommending that she continue that medication.  Again, if we had significant mode switching on device checks I would plan to recommend an oral anticoagulant.    I reviewed with her the results of her CT coronary calcium scan with a total calcium score of 5.9.  I discussed with her that in junction with her flecainide use, the medication continues to be exceedingly safe.  Given that she has no mode switching whatsoever, I would wonder whether that medication could be stopped or cut down in the future.  I have made no changes to it at this time.  I would continue diet, exercise, and simvastatin for treatment of her lipids.    Micky Patel MD  Cardiologist, [facility]     Return in about 6 months (around 10/9/2019).    "

## 2019-04-24 ENCOUNTER — NON-PROVIDER VISIT (OUTPATIENT)
Dept: CARDIOLOGY | Facility: MEDICAL CENTER | Age: 69
End: 2019-04-24
Payer: MEDICARE

## 2019-04-24 DIAGNOSIS — Z95.0 CARDIAC PACEMAKER IN SITU: ICD-10-CM

## 2019-04-24 PROCEDURE — 93280 PM DEVICE PROGR EVAL DUAL: CPT | Performed by: INTERNAL MEDICINE

## 2019-05-17 ENCOUNTER — OFFICE VISIT (OUTPATIENT)
Dept: URGENT CARE | Facility: CLINIC | Age: 69
End: 2019-05-17
Payer: MEDICARE

## 2019-05-17 VITALS
HEIGHT: 66 IN | WEIGHT: 145.8 LBS | DIASTOLIC BLOOD PRESSURE: 78 MMHG | HEART RATE: 60 BPM | OXYGEN SATURATION: 95 % | RESPIRATION RATE: 16 BRPM | TEMPERATURE: 97.4 F | BODY MASS INDEX: 23.43 KG/M2 | SYSTOLIC BLOOD PRESSURE: 120 MMHG

## 2019-05-17 DIAGNOSIS — H93.8X1 SENSATION OF FULLNESS IN RIGHT EAR: ICD-10-CM

## 2019-05-17 PROCEDURE — 99214 OFFICE O/P EST MOD 30 MIN: CPT | Performed by: PHYSICIAN ASSISTANT

## 2019-05-17 ASSESSMENT — ENCOUNTER SYMPTOMS
SHORTNESS OF BREATH: 0
SPUTUM PRODUCTION: 0
ABDOMINAL PAIN: 0
COUGH: 0
DIARRHEA: 0
MUSCULOSKELETAL NEGATIVE: 1
SORE THROAT: 0
VOMITING: 0
CHILLS: 0
NAUSEA: 0
FEVER: 0
DIZZINESS: 0

## 2019-05-17 NOTE — PROGRESS NOTES
"Subjective:      Simona Jensen is a 68 y.o. female who presents with Ear Fullness (x 2 months off / on, Rt. ear fullness)            Ear Fullness   This is a new problem. The current episode started more than 1 month ago (2 months). The problem occurs constantly. The problem has been unchanged. Pertinent negatives include no abdominal pain, chest pain, chills, congestion, coughing, fever, nausea, rash, sore throat or vomiting. Nothing aggravates the symptoms. She has tried nothing for the symptoms.     Patient presents to urgent care reporting a sensation of ear fullness x 2 months since she was seen in urgent care and had ear lavage performed for cerumen impaction of right ear. No fevers, chills, body aches, ear pain, ear discharge, sore throat, tinnitus, or sinus congestion.     Review of Systems   Constitutional: Negative for chills and fever.   HENT: Negative for congestion, ear pain, hearing loss, sore throat and tinnitus.         + ear fullness   Respiratory: Negative for cough, sputum production and shortness of breath.    Cardiovascular: Negative for chest pain.   Gastrointestinal: Negative for abdominal pain, diarrhea, nausea and vomiting.   Genitourinary: Negative.    Musculoskeletal: Negative.    Skin: Negative for rash.   Neurological: Negative for dizziness.        Objective:     /78 (BP Location: Left arm, Patient Position: Sitting, BP Cuff Size: Adult)   Pulse 60   Temp 36.3 °C (97.4 °F) (Temporal)   Resp 16   Ht 1.676 m (5' 6\")   Wt 66.1 kg (145 lb 12.8 oz)   SpO2 95%   BMI 23.53 kg/m²      Physical Exam   Constitutional: She is oriented to person, place, and time. She appears well-developed and well-nourished. No distress.   HENT:   Head: Normocephalic and atraumatic.   Right Ear: Hearing, tympanic membrane, external ear and ear canal normal.   Left Ear: Hearing, tympanic membrane, external ear and ear canal normal.   Mouth/Throat: Oropharynx is clear and moist. No " oropharyngeal exudate, posterior oropharyngeal edema or posterior oropharyngeal erythema.   Eyes: Pupils are equal, round, and reactive to light. Conjunctivae are normal. Right eye exhibits no discharge. Left eye exhibits no discharge.   Neck: Normal range of motion.   Cardiovascular: Normal rate, regular rhythm and normal heart sounds.    No murmur heard.  Pulmonary/Chest: Effort normal and breath sounds normal. No respiratory distress. She has no wheezes. She has no rales.   Musculoskeletal: Normal range of motion.   Lymphadenopathy:     She has no cervical adenopathy.   Neurological: She is alert and oriented to person, place, and time.   Skin: Skin is warm and dry. She is not diaphoretic.   Psychiatric: She has a normal mood and affect. Her behavior is normal.   Nursing note and vitals reviewed.         PMH:  has a past medical history of Cardiac pacemaker in situ (1/25/2012); Cataract; Hyperlipidemia; Menopause; Other specified disorder of intestines; Pacemaker (June 2010); Paroxysmal atrial fibrillation ([facility]); and SSS (sick sinus syndrome) ([facility]). She also has no past medical history of Breast cancer ([facility]).  MEDS:   Current Outpatient Prescriptions:   •  doxycycline (VIBRAMYCIN) 100 MG Cap, 1 CAP TWICE A DAY X 10 DAYS. (Patient not taking: Reported on 4/9/2019), Disp: 20 Cap, Rfl: 0  •  Cholecalciferol (VITAMIN D3) 2000 UNIT Cap, Take 1 Cap by mouth every day., Disp: 30 Cap, Rfl: 0  •  flecainide (TAMBOCOR) 100 MG Tab, Take 1 Tab by mouth 2 times a day., Disp: 180 Tab, Rfl: 2  •  simvastatin (ZOCOR) 40 MG Tab, Take 1 Tab by mouth every evening., Disp: 90 Tab, Rfl: 3  •  traZODone (DESYREL) 100 MG Tab, Take 1 Tab by mouth at bedtime as needed for Sleep., Disp: 90 Tab, Rfl: 3  •  aspirin EC (ECOTRIN) 81 MG TBEC, Take 81 mg by mouth every day., Disp: , Rfl:   •  MULTIPLE VITAMINS PO, Take 1 Tab by mouth every day., Disp: , Rfl:   ALLERGIES:   Allergies   Allergen Reactions   • Nkda [No Known Drug Allergy]       SURGHX:   Past Surgical History:   Procedure Laterality Date   • BREAST BIOPSY  4/28/2014    Performed by Alex Cruz M.D. at SURGERY SAME DAY [facility] ORS   • LOW ANTERIOR RESECTION ROBOTIC  5/28/2013    Performed by Prashant Almanzar M.D. at SURGERY [facility] ORS   • PACEMAKER INSERTION  June 2010    [employer] Altrua 60 S606 implanted by Dr. Marte, [facility].   • ABDOMINAL HYSTERECTOMY TOTAL      still has ovaries   • HYSTERECTOMY, TOTAL ABDOMINAL     • PACEMAKER INSERTION       SOCHX:  reports that she has never smoked. She has never used smokeless tobacco. She reports that she drinks about 0.5 oz of alcohol per week . She reports that she does not use drugs.  FH: family history includes Arrythmia in her brother and mother; Cancer in her brother, maternal aunt, and mother; Diabetes in her brother and mother; Heart Disease in her brother and father; Hyperlipidemia in her mother; Hypertension in her brother and mother.       Assessment/Plan:     1. Sensation of fullness in right ear    Normal exam at today's visit. Encouraged to take OTC decongestant, OTC antihistamines, and Flonase nasal spray as needed for symptomatic relief. Follow up with ENT if symptoms persist/worsen. The patient demonstrated a good understanding and agreed with the treatment plan.

## 2019-07-16 ENCOUNTER — OFFICE VISIT (OUTPATIENT)
Dept: CARDIOLOGY | Facility: MEDICAL CENTER | Age: 69
End: 2019-07-16
Payer: MEDICARE

## 2019-07-16 VITALS
WEIGHT: 148.4 LBS | BODY MASS INDEX: 23.85 KG/M2 | DIASTOLIC BLOOD PRESSURE: 82 MMHG | HEART RATE: 60 BPM | SYSTOLIC BLOOD PRESSURE: 130 MMHG | HEIGHT: 66 IN | OXYGEN SATURATION: 93 %

## 2019-07-16 DIAGNOSIS — I48.0 PAF (PAROXYSMAL ATRIAL FIBRILLATION) (HCC): ICD-10-CM

## 2019-07-16 DIAGNOSIS — R93.1 AGATSTON CORONARY ARTERY CALCIUM SCORE LESS THAN 100: ICD-10-CM

## 2019-07-16 DIAGNOSIS — I49.5 SSS (SICK SINUS SYNDROME) (HCC): ICD-10-CM

## 2019-07-16 DIAGNOSIS — E78.2 MIXED HYPERLIPIDEMIA: ICD-10-CM

## 2019-07-16 LAB — EKG IMPRESSION: NORMAL

## 2019-07-16 PROCEDURE — 93000 ELECTROCARDIOGRAM COMPLETE: CPT | Performed by: INTERNAL MEDICINE

## 2019-07-16 PROCEDURE — 99214 OFFICE O/P EST MOD 30 MIN: CPT | Performed by: INTERNAL MEDICINE

## 2019-07-16 RX ORDER — METOPROLOL SUCCINATE 25 MG/1
25 TABLET, EXTENDED RELEASE ORAL DAILY
Qty: 90 TAB | Refills: 3 | Status: SHIPPED | OUTPATIENT
Start: 2019-07-16 | End: 2019-08-01 | Stop reason: SDUPTHER

## 2019-07-16 ASSESSMENT — ENCOUNTER SYMPTOMS: INSOMNIA: 1

## 2019-07-16 NOTE — PROGRESS NOTES
Cardiology Follow-up Consultation Note    Date of note:    7/16/2019    Primary Care Provider: Pcp Pt States None  Referring Provider: Micky Patel M.D.     Patient Name: Simona Jensen   YOB: 1950  MRN:              [id number]    Chief Complaint: atrial fibrillation    History of Present Illness: Simona Jensen is a 68 y.o. female whose current medical problems include dyslipidemia, sick sinus syndrome status post pacemaker implantation ([employer], 6/2010), and paroxysmal atrial fibrillation rhythm controlled with flecainide who is here for follow-up.    Last seen by Dr. Micky Patel on 4/9/2019.    Interim Events:  In terms of atrial fibrillation, no palpitations.     In terms of pacemaker, v paced 28%, a paced 81%.     In terms of dyslipidemia,     Review of Systems   Psychiatric/Behavioral: The patient has insomnia.    Constitution: Negative for chills, fever and night sweats.   HENT: Negative for nosebleeds.    Eyes: Negative for vision loss in left eye and vision loss in right eye.   Respiratory: Negative for hemoptysis.    Gastrointestinal: Negative for hematemesis, hematochezia and melena.   Genitourinary: Negative for hematuria.   Neurological: Negative for focal weakness, numbness and paresthesias.         All other systems reviewed and discussed using a comprehensive questionnaire and are negative.     Past Medical History:   Diagnosis Date   • Cardiac pacemaker in situ 1/25/2012 June 2010: [employer] Altrua 60 S606 implanted by Dr. Marte, [facility].    • Cataract     OU   • Hyperlipidemia    • Menopause    • Other specified disorder of intestines     Divertuculosis   • Pacemaker June 2010   • Paroxysmal atrial fibrillation (HCC)     Treated with Flecainide   • SSS (sick sinus syndrome) (HCC)          Past Surgical History:   Procedure Laterality Date   • BREAST BIOPSY  4/28/2014    Performed by Alex Cruz M.D. at SURGERY SAME DAY  [facility] ORS   • LOW ANTERIOR RESECTION ROBOTIC  5/28/2013    Performed by Prashant Almanzar M.D. at SURGERY [facility] ORS   • PACEMAKER INSERTION  June 2010    [employer] Altrua 60 [id number] implanted by Dr. Marte, [facility].   • ABDOMINAL HYSTERECTOMY TOTAL      still has ovaries   • HYSTERECTOMY, TOTAL ABDOMINAL     • PACEMAKER INSERTION           Current Outpatient Prescriptions   Medication Sig Dispense Refill   • Cholecalciferol (VITAMIN D3) 2000 UNIT Cap Take 1 Cap by mouth every day. 30 Cap 0   • flecainide (TAMBOCOR) 100 MG Tab Take 1 Tab by mouth 2 times a day. 180 Tab 2   • simvastatin (ZOCOR) 40 MG Tab Take 1 Tab by mouth every evening. 90 Tab 3   • traZODone (DESYREL) 100 MG Tab Take 1 Tab by mouth at bedtime as needed for Sleep. 90 Tab 3   • aspirin EC (ECOTRIN) 81 MG TBEC Take 81 mg by mouth every day.     • doxycycline (VIBRAMYCIN) 100 MG Cap 1 CAP TWICE A DAY X 10 DAYS. (Patient not taking: Reported on 4/9/2019) 20 Cap 0   • MULTIPLE VITAMINS PO Take 1 Tab by mouth every day.       No current facility-administered medications for this visit.          Allergies   Allergen Reactions   • Nkda [No Known Drug Allergy]          Family History   Problem Relation Age of Onset   • Arrythmia Mother    • Diabetes Mother    • Cancer Mother         breast, lung   • Hypertension Mother    • Hyperlipidemia Mother    • Arrythmia Brother    • Diabetes Brother    • Hypertension Brother    • Cancer Brother         Prsotate CA   • Heart Disease Brother    • Heart Disease Father    • Cancer Maternal Aunt         breast         Social History     Social History   • Marital status:      Spouse name: N/A   • Number of children: N/A   • Years of education: N/A     Occupational History   • Not on file.     Social History Main Topics   • Smoking status: Never Smoker   • Smokeless tobacco: Never Used   • Alcohol use 0.5 oz/week     1 Glasses of wine per week      Comment: social, 1 per week   • Drug use:  "No   • Sexual activity: Yes     Partners: Male     Other Topics Concern   • Not on file     Social History Narrative   • No narrative on file         Physical Exam:  Ambulatory Vitals  /82 (BP Location: Left arm, Patient Position: Sitting, BP Cuff Size: Adult)   Pulse 60   Ht 1.676 m (5' 6\")   Wt 67.3 kg (148 lb 6.4 oz)   SpO2 93%    Oxygen Therapy:  Pulse Oximetry: 93 %  BP Readings from Last 4 Encounters:   07/16/19 130/82   05/17/19 120/78   04/09/19 102/70   03/24/19 110/58       Weight/BMI: Body mass index is 23.95 kg/m².  Wt Readings from Last 4 Encounters:   07/16/19 67.3 kg (148 lb 6.4 oz)   05/17/19 66.1 kg (145 lb 12.8 oz)   04/09/19 67 kg (147 lb 11.3 oz)   03/24/19 67 kg (147 lb 9.6 oz)       General: Well appearing and in no apparent distress  Eyes: nl conjunctiva  ENT: OP clear, normal external appearance of nose and ears  Neck: JVP 4 cm H2O, no carotid bruits  Lungs: normal respiratory effort, CTAB  Heart: RRR, no murmurs, no rubs or gallops, no edema bilateral lower extremities. No LV/RV heave on cardiac palpatation. 2+ bilateral radial pulses.  2+ bilateral dp pulses.   Abdomen: soft, non tender, non distended, no masses, normal bowel sounds.  No HSM.  Extremities/MSK: no clubbing, no cyanosis  Neurological: No focal sensory deficits  Psychiatric: Appropriate affect, A/O x 3, intact judgement and insight  Skin: Warm extremities    Lab Data Review:  Lab Results   Component Value Date/Time    CHOLSTRLTOT 213 (H) 09/04/2018 12:45 PM    LDL 99 09/04/2018 12:45 PM    HDL 84 09/04/2018 12:45 PM    TRIGLYCERIDE 150 (H) 09/04/2018 12:45 PM       Lab Results   Component Value Date/Time    SODIUM 140 09/04/2018 12:45 PM    POTASSIUM 4.3 09/04/2018 12:45 PM    CHLORIDE 102 09/04/2018 12:45 PM    CO2 27 09/04/2018 12:45 PM    GLUCOSE 75 09/04/2018 12:45 PM    BUN 20 09/04/2018 12:45 PM    CREATININE 0.95 09/04/2018 12:45 PM     Lab Results   Component Value Date/Time    ALKPHOSPHAT 75 09/04/2018 12:45 " PM    ASTSGOT 21 2018 12:45 PM    ALTSGPT 23 2018 12:45 PM    TBILIRUBIN 0.6 2018 12:45 PM      Lab Results   Component Value Date/Time    WBC 6.8 2018 12:45 PM     No components found for: HBGA1C  No components found for: TROPONIN  No components found for: BNP      Cardiac Imaging and Procedures Review:    EKG dated 2019 : My personal interpretation is a paced, non-specific IVCD, QRS 110ms, low voltage, nl QT. QRS duration minimally changed from 2014 EKG.       Radiology test Review:  CXR: 3/2019  There is a left-sided pacer with leads projecting over the right atrium and right ventricle. The heart is not enlarged. No focal consolidation, pleural effusion or pneumothorax is identified.  Costophrenic angles are clear. Degenerative changes are seen   in the spine.    CCS 2019:  FINDINGS:    Coronary calcification:  LMA - 0.0  LCX - 0.0  LAD - 5.9  RCA - 0.0    Total Calcium Score: 5.9    Percentile: Calcium score is above the 25th percentile for the patient's age and sex.    Other findings:  Heart: Normal size.  Lungs: Clear.  Mediastinum: Normal.  Upper abdomen: Normal.      Medical Decision Makin. SSS (sick sinus syndrome) ([facility])  F/u with pacemaker clinic as needed    2. PAF (paroxysmal atrial fibrillation) ([facility])  Asymptomatic.  -continue flecainide  -start metoprolol XL 25mg PO Daily  -[id number] score of 1, no NOAC indicated at this time, continue aspirin.    3. Mixed hyperlipidemia  Mild and minimal elevated for calcium score.  -stop statin, check lipids off statin.    4. Agatston coronary artery calcium score less than 100  Stop statin, check lipids off statin. Repeat calcium score in 5 years if she stays off statin.       Return in about 1 year (around 2020).      Deon Clemente MD, [facility]  [facility]  [address] [address]  Phone: [phone]  Fax: [phone]

## 2019-07-16 NOTE — PATIENT INSTRUCTIONS
Please stop simvastatin and start metoprolol XL 25mg once a day to control your heart rate.     Please get fasting blood tests in 3 months.

## 2019-07-30 ENCOUNTER — TELEPHONE (OUTPATIENT)
Dept: CARDIOLOGY | Facility: MEDICAL CENTER | Age: 69
End: 2019-07-30

## 2019-07-30 DIAGNOSIS — I48.0 PAROXYSMAL ATRIAL FIBRILLATION (HCC): ICD-10-CM

## 2019-07-30 NOTE — TELEPHONE ENCOUNTER
Simona Clemente M.D. 1 hour ago (2:11 PM)         Dr. Clemente prescribed Metoprolol recently and after taking it for six days, I was experiencing dizziness, fatigue and lightheadedness.   I also take Flecainide, which causes similar symptoms.   Together, the side effects were a bit much, so I discontinued the Metoprolol.  I left a voice mail regarding this yesterday morning, but have not heard back from anyone, thus this message. I wanted Dr. Clemente to be aware of this in case he wanted me to continue the medication or prescribe another.  I do not have high blood pressure, which the Metoprolol is used for.  However, I will follow Dr. Clemente's recommendations.              Spoke with pt via phone. Pt stated she stopped taking Metoprolol Friday morning, pt stated her symptoms decreased while off of the Metoprolol. Pt stated the last time she took her BP today was SBP 99. Pt advised to come to the office to check her PPM, agreed to come in tomorrow (Wednesday July 31) at 1pm. Spoke with Lou, agreed to call rep to check pt's device.    To Dr. Clemente

## 2019-07-31 ENCOUNTER — NON-PROVIDER VISIT (OUTPATIENT)
Dept: CARDIOLOGY | Facility: MEDICAL CENTER | Age: 69
End: 2019-07-31
Payer: MEDICARE

## 2019-07-31 NOTE — TELEPHONE ENCOUNTER
Pt came in for her 1:00pm device check. Was informed that device check is normal. Spoke with pt and . Pt reports symptoms are better ever since off metoprolol. BP normal with reported . HR good as well per pt. Denies palpitations or heart racing. She will continue to hold metoprolol at this time until otherwise instructed by Dr. Clemente. Discussed other interventions to pt like elevating legs, cross leg squeezes, staying hydrated, drinking broth to help with dizziness at times. Discussed that we'll give her a call if Dr. Clemente has other recommendations.

## 2019-08-01 RX ORDER — METOPROLOL SUCCINATE 25 MG/1
25 TABLET, EXTENDED RELEASE ORAL DAILY
Qty: 90 TAB | Refills: 3 | COMMUNITY
Start: 2019-08-01 | End: 2020-06-23

## 2019-08-01 NOTE — TELEPHONE ENCOUNTER
Spoke with pt via phone and discussed Dr. Clemente's recommendations. Pt agreed and verbalized understanding. Discussed that pt can always schedule a sooner appointment if she wants to see Dr. Clemente earlier than her next appointment.    MAR updated.

## 2019-08-01 NOTE — TELEPHONE ENCOUNTER
She needs to be on at least low dose metoprolol while on flecainide or else she is at risk of a fast heart rate arrhythmia. Please have her start metoprolol XL 12.5mg PO daily, just half of the previous dose, and try it for another week. Her body should get used to it, but let me know if not. Stay hydrated as well. Thanks!

## 2019-10-21 ENCOUNTER — HOSPITAL ENCOUNTER (OUTPATIENT)
Dept: LAB | Facility: MEDICAL CENTER | Age: 69
End: 2019-10-21
Attending: INTERNAL MEDICINE
Payer: MEDICARE

## 2019-10-21 DIAGNOSIS — R93.1 AGATSTON CORONARY ARTERY CALCIUM SCORE LESS THAN 100: ICD-10-CM

## 2019-10-21 DIAGNOSIS — I49.5 SSS (SICK SINUS SYNDROME) (HCC): ICD-10-CM

## 2019-10-21 DIAGNOSIS — I48.0 PAF (PAROXYSMAL ATRIAL FIBRILLATION) (HCC): ICD-10-CM

## 2019-10-21 DIAGNOSIS — E78.2 MIXED HYPERLIPIDEMIA: ICD-10-CM

## 2019-10-21 PROCEDURE — 80048 BASIC METABOLIC PNL TOTAL CA: CPT

## 2019-10-21 PROCEDURE — 80061 LIPID PANEL: CPT

## 2019-10-21 PROCEDURE — 36415 COLL VENOUS BLD VENIPUNCTURE: CPT

## 2019-10-22 LAB
ANION GAP SERPL CALC-SCNC: 10 MMOL/L (ref 0–11.9)
BUN SERPL-MCNC: 23 MG/DL (ref 8–22)
CALCIUM SERPL-MCNC: 9.3 MG/DL (ref 8.5–10.5)
CHLORIDE SERPL-SCNC: 106 MMOL/L (ref 96–112)
CHOLEST SERPL-MCNC: 306 MG/DL (ref 100–199)
CO2 SERPL-SCNC: 26 MMOL/L (ref 20–33)
CREAT SERPL-MCNC: 0.88 MG/DL (ref 0.5–1.4)
GLUCOSE SERPL-MCNC: 76 MG/DL (ref 65–99)
HDLC SERPL-MCNC: 65 MG/DL
LDLC SERPL CALC-MCNC: 201 MG/DL
POTASSIUM SERPL-SCNC: 4.2 MMOL/L (ref 3.6–5.5)
SODIUM SERPL-SCNC: 142 MMOL/L (ref 135–145)
TRIGL SERPL-MCNC: 201 MG/DL (ref 0–149)

## 2019-10-24 ENCOUNTER — NON-PROVIDER VISIT (OUTPATIENT)
Dept: CARDIOLOGY | Facility: MEDICAL CENTER | Age: 69
End: 2019-10-24
Payer: MEDICARE

## 2019-10-24 DIAGNOSIS — Z95.0 CARDIAC PACEMAKER IN SITU: ICD-10-CM

## 2019-10-24 PROCEDURE — 93280 PM DEVICE PROGR EVAL DUAL: CPT | Performed by: INTERNAL MEDICINE

## 2019-10-25 ENCOUNTER — TELEPHONE (OUTPATIENT)
Dept: CARDIOLOGY | Facility: MEDICAL CENTER | Age: 69
End: 2019-10-25

## 2019-10-25 DIAGNOSIS — E78.2 MIXED HYPERLIPIDEMIA: ICD-10-CM

## 2019-10-25 NOTE — TELEPHONE ENCOUNTER
Cholesterol more than doubled indicating genetic component to her hypercholesterolemia.       Would restart simvastatin 40mg PO daily as this did well to control her cholesterol. Thanks!

## 2019-10-28 RX ORDER — SIMVASTATIN 40 MG
40 TABLET ORAL EVERY EVENING
Qty: 90 TAB | Refills: 3 | Status: SHIPPED | OUTPATIENT
Start: 2019-10-28 | End: 2020-07-09

## 2019-10-28 NOTE — TELEPHONE ENCOUNTER
Called pt, discussed labs per Dr Clemente and his recommendations, pt agreed and requested Rx for Simvastatin to be sent to [facility], Rx sent.

## 2019-10-31 ENCOUNTER — OFFICE VISIT (OUTPATIENT)
Dept: MEDICAL GROUP | Facility: PHYSICIAN GROUP | Age: 69
End: 2019-10-31
Payer: MEDICARE

## 2019-10-31 VITALS
BODY MASS INDEX: 24.75 KG/M2 | DIASTOLIC BLOOD PRESSURE: 64 MMHG | RESPIRATION RATE: 16 BRPM | SYSTOLIC BLOOD PRESSURE: 110 MMHG | HEIGHT: 66 IN | WEIGHT: 154 LBS | HEART RATE: 60 BPM | TEMPERATURE: 98.4 F | OXYGEN SATURATION: 97 %

## 2019-10-31 DIAGNOSIS — Z11.59 NEED FOR HEPATITIS C SCREENING TEST: ICD-10-CM

## 2019-10-31 DIAGNOSIS — G47.00 INSOMNIA, UNSPECIFIED TYPE: ICD-10-CM

## 2019-10-31 DIAGNOSIS — E78.2 MIXED HYPERLIPIDEMIA: ICD-10-CM

## 2019-10-31 DIAGNOSIS — Z95.0 CARDIAC PACEMAKER IN SITU: ICD-10-CM

## 2019-10-31 DIAGNOSIS — I49.5 SSS (SICK SINUS SYNDROME) (HCC): ICD-10-CM

## 2019-10-31 DIAGNOSIS — Z23 NEED FOR VACCINATION: ICD-10-CM

## 2019-10-31 DIAGNOSIS — I48.0 PAF (PAROXYSMAL ATRIAL FIBRILLATION) (HCC): ICD-10-CM

## 2019-10-31 DIAGNOSIS — Z12.11 COLON CANCER SCREENING: ICD-10-CM

## 2019-10-31 PROCEDURE — G0008 ADMIN INFLUENZA VIRUS VAC: HCPCS | Performed by: FAMILY MEDICINE

## 2019-10-31 PROCEDURE — 99214 OFFICE O/P EST MOD 30 MIN: CPT | Mod: 25 | Performed by: FAMILY MEDICINE

## 2019-10-31 PROCEDURE — 90662 IIV NO PRSV INCREASED AG IM: CPT | Performed by: FAMILY MEDICINE

## 2019-10-31 ASSESSMENT — PATIENT HEALTH QUESTIONNAIRE - PHQ9: CLINICAL INTERPRETATION OF PHQ2 SCORE: 0

## 2019-10-31 NOTE — ASSESSMENT & PLAN NOTE
This is a chronic condition. She had a pacemaker placed in 2010. She gets it checked regularly by cardiology and is due for next check in January.

## 2019-10-31 NOTE — ASSESSMENT & PLAN NOTE
This is a chronic condition. She follows with cardiology regularly. It was placed for sick sinus syndrome and she is due for a new battery next year.

## 2019-10-31 NOTE — PROGRESS NOTES
Subjective:     CC:  Diagnoses of Mixed hyperlipidemia, SSS (sick sinus syndrome) (HCC), Cardiac pacemaker in situ, PAF (paroxysmal atrial fibrillation) (HCC), Insomnia, unspecified type, Colon cancer screening, Need for hepatitis C screening test, and Need for vaccination were pertinent to this visit.    HISTORY OF THE PRESENT ILLNESS: Patient is a 69 y.o. female. This pleasant patient is here today to establish care. Her prior PCP was Juan Sanchez MD.    Hyperlipidemia  This is a chronic condition. She had been on simvastatin for primary prevention. She got a coronary calcium score  <100, so it was stopped. After 3 months, cholesterol greatly increased, so she was restarted on simvastatin a few days ago. She is tolerating it well.     Cardiac pacemaker in situ  This is a chronic condition. She follows with cardiology regularly. It was placed for sick sinus syndrome and she is due for a new battery next year.    SSS (sick sinus syndrome)  This is a chronic condition. She had a pacemaker placed in 2010. She gets it checked regularly by cardiology and is due for next check in January.    PAF (paroxysmal atrial fibrillation)  This is a chronic condition. She has persistent a fib and is on flecainide and metoprolol. She follows with cardiology regularly. She denies any palpitations, cp, sob.    Insomnia  This is a chronic condition. She has had trouble staying asleep for years. She is on trazodone which works somewhat for her sleep. She has no interest in trying anything stronger. She uses it every night.      Allergies: Nkda [no known drug allergy]    Current Outpatient Medications Ordered in Epic   Medication Sig Dispense Refill   • simvastatin (ZOCOR) 40 MG Tab Take 1 Tab by mouth every evening. 90 Tab 3   • metoprolol SR (TOPROL XL) 25 MG TABLET SR 24 HR Take 25 mg by mouth every day. 90 Tab 3   • Cholecalciferol (VITAMIN D3) 2000 UNIT Cap Take 1 Cap by mouth every day. 30 Cap 0   • flecainide (TAMBOCOR) 100 MG Tab  Take 1 Tab by mouth 2 times a day. 180 Tab 2   • traZODone (DESYREL) 100 MG Tab Take 1 Tab by mouth at bedtime as needed for Sleep. 90 Tab 3   • aspirin EC (ECOTRIN) 81 MG TBEC Take 81 mg by mouth every day.     • MULTIPLE VITAMINS PO Take 1 Tab by mouth every day.       No current [facility]-ordered facility-administered medications on file.        Past Medical History:   Diagnosis Date   • Agatston coronary artery calcium score less than 100    • Cardiac pacemaker in situ 01/25/2012 June 2010: [employer] Altrua 60 [id number] implanted by Dr. Marte, [facility].    • Cataract     OU   • Hyperlipidemia    • Menopause    • Other specified disorder of intestines     Divertuculosis   • Paroxysmal atrial fibrillation (HCC)     Treated with Flecainide   • SSS (sick sinus syndrome) (HCC)        Past Surgical History:   Procedure Laterality Date   • BREAST BIOPSY  4/28/2014    Performed by Alex Cruz M.D. at SURGERY SAME DAY [facility] ORS   • LOW ANTERIOR RESECTION ROBOTIC  5/28/2013    Performed by Prashant Almanzar M.D. at SURGERY [facility] ORS   • COLON RESECTION  2013 8 in colectomy   • PACEMAKER INSERTION  June 2010    [employer] Altrua 60 [id number] implanted by Dr. Marte, [facility].   • ABDOMINAL HYSTERECTOMY TOTAL  1981    for endometriosis, still has ovaries       Social History     Tobacco Use   • Smoking status: Never Smoker   • Smokeless tobacco: Never Used   Substance Use Topics   • Alcohol use: Yes     Alcohol/week: 0.5 oz     Types: 1 Glasses of wine per week     Comment: social, 1 per week   • Drug use: No       Social History     Social History Narrative    Retired .        Family History   Problem Relation Age of Onset   • Arrythmia Mother    • Diabetes Mother    • Cancer Mother         breast, lung   • Hypertension Mother    • Hyperlipidemia Mother    • Arrythmia Brother    • Diabetes Brother    • Hypertension Brother    • Cancer Brother          "Prsotate CA   • Heart Disease Brother    • Heart Disease Father    • Cancer Maternal Aunt         breast   • Cancer Maternal Aunt         breast       Health Maintenance: Completed    ROS:   Gen: no fevers/chills  Eyes: no changes in vision  ENT: no sore throat  Pulm: no cough  CV: no palpitations  GI: + diarrhea - last night, not uncommon  : no dysuria  MSk: no myalgias  Skin: no rash  Neuro: no headaches      Objective:     Exam: /64   Pulse 60   Temp 36.9 °C (98.4 °F)   Resp 16   Ht 1.676 m (5' 6\")   Wt 69.9 kg (154 lb)   SpO2 97%  Body mass index is 24.86 kg/m².    General: Normal appearing. No distress.  HEENT: Normocephalic. Eyes conjunctiva clear lids without ptosis, pupils equal and reactive to light accommodation, ears normal shape and contour, canals are clear bilaterally, tympanic membranes are benign, oropharynx is without erythema, edema or exudates.   Neck: Supple without JVD. Thyroid is not enlarged.  Pulmonary: Clear to ausculation.  Normal effort. No rales, ronchi, or wheezing.  Cardiovascular: Regular rate and rhythm without murmur. Carotid and radial pulses are intact and equal bilaterally.  Abdomen: Soft, nontender, nondistended. Normal bowel sounds. Liver and spleen are not palpable  Neurologic: Grossly nonfocal  Lymph: No cervical or supraclavicular lymph nodes are palpable  Skin: Warm and dry.  No obvious lesions.  Musculoskeletal: Normal gait. No extremity cyanosis, clubbing, or edema.  Psych: Normal mood and affect. Alert and oriented x3. Judgment and insight is normal.    Assessment & Plan:   69 y.o. female with the following -    1. Mixed hyperlipidemia  This is a chronic condition, stable.  She states she been on simvastatin for many years for primary prevention.  She then got a coronary calcium score that was lost 100 so her cardiologist stopped the medication to see what her cholesterol do off the medication.  After 3 months the cholesterol greatly increase it was " restarted a few days ago.  She is tolerating the simvastatin well.  -Continue simvastatin 40 mg daily  -Continue to follow with cardiology    2. SSS (sick sinus syndrome) ([facility])  3. Cardiac pacemaker in situ  This is a chronic condition, controlled.  She has a history of sick sinus syndrome and ended up having a pacemaker placed in  to treat this.  She follows with cardiology regularly who test the pacemaker at regular intervals.  She states that the battery life is starting to decline so next year they plan to replace the battery in the pacemaker.  She denies any chest pain, shortness of breath, or heart palpitations.  -Continue to follow with cardiology    4. PAF (paroxysmal atrial fibrillation) (HCC)  This is a chronic condition, stable.  She has a long-standing history of atrial fibrillation that was persistent.  Cardiology started her on flecainide and metoprolol.  She states that she has been in sinus rhythm for a few years with her current dose of flecainide and the metoprolol works very well to control the rate.  She was in sinus rhythm on exam today.  - CBC WITH DIFFERENTIAL; Future  -Continue flecainide 100 mg twice daily  -Continue metoprolol SR 25 mg daily    5. Insomnia, unspecified type  This is a chronic condition, stable.  She reports for many years she is had difficulty being able to stay asleep.  She currently is using trazodone nightly and she finds that it works only somewhat.  However, she has no interest in trying any other medications or stronger medications for her sleep.  We did briefly discussed taking a break from the trazodone and using it as needed as she may have built a tolerance to the trazodone dose.  -Continue trazodone    6. Colon cancer screening  - REFERRAL TO GI FOR COLONOSCOPY    7. Need for hepatitis C screening test  He qualifies for hepatitis C screening per CDC guidelines.  - HCV Scrn ( [id number] 1xLife); Future    8. Need for vaccination  - INFLUENZA VACCINE, HIGH  DOSE (65+ ONLY)    Return in about 6 months (around 4/30/2020) for Medicare Annual/wellness visit.    Please note that this dictation was created using voice recognition software. I have made every reasonable attempt to correct obvious errors, but I expect that there are errors of grammar and possibly content that I did not discover before finalizing the note.

## 2019-10-31 NOTE — ASSESSMENT & PLAN NOTE
This is a chronic condition. She has had trouble staying asleep for years. She is on trazodone which works somewhat for her sleep. She has no interest in trying anything stronger. She uses it every night.

## 2019-10-31 NOTE — ASSESSMENT & PLAN NOTE
This is a chronic condition. She has persistent a fib and is on flecainide and metoprolol. She follows with cardiology regularly. She denies any palpitations, cp, sob.

## 2019-10-31 NOTE — ASSESSMENT & PLAN NOTE
This is a chronic condition. She had been on simvastatin for primary prevention. She got a coronary calcium score  <100, so it was stopped. After 3 months, cholesterol greatly increased, so she was restarted on simvastatin a few days ago. She is tolerating it well.

## 2020-01-07 ENCOUNTER — NON-PROVIDER VISIT (OUTPATIENT)
Dept: CARDIOLOGY | Facility: MEDICAL CENTER | Age: 70
End: 2020-01-07
Payer: MEDICARE

## 2020-01-07 DIAGNOSIS — Z95.0 CARDIAC PACEMAKER IN SITU: ICD-10-CM

## 2020-01-07 PROCEDURE — 93280 PM DEVICE PROGR EVAL DUAL: CPT | Performed by: INTERNAL MEDICINE

## 2020-01-08 ENCOUNTER — HOSPITAL ENCOUNTER (OUTPATIENT)
Dept: RADIOLOGY | Facility: MEDICAL CENTER | Age: 70
End: 2020-01-08
Attending: FAMILY MEDICINE
Payer: MEDICARE

## 2020-01-08 DIAGNOSIS — Z12.31 VISIT FOR SCREENING MAMMOGRAM: ICD-10-CM

## 2020-01-08 PROCEDURE — 77067 SCR MAMMO BI INCL CAD: CPT

## 2020-02-17 DIAGNOSIS — G47.00 INSOMNIA, UNSPECIFIED TYPE: ICD-10-CM

## 2020-02-18 RX ORDER — TRAZODONE HYDROCHLORIDE 100 MG/1
TABLET ORAL
Qty: 90 TAB | Refills: 3 | Status: SHIPPED | OUTPATIENT
Start: 2020-02-18 | End: 2021-02-16

## 2020-03-04 ENCOUNTER — NON-PROVIDER VISIT (OUTPATIENT)
Dept: CARDIOLOGY | Facility: MEDICAL CENTER | Age: 70
End: 2020-03-04
Payer: MEDICARE

## 2020-03-04 DIAGNOSIS — Z95.0 CARDIAC PACEMAKER IN SITU: ICD-10-CM

## 2020-03-04 PROCEDURE — 93280 PM DEVICE PROGR EVAL DUAL: CPT | Performed by: INTERNAL MEDICINE

## 2020-04-29 ENCOUNTER — HOSPITAL ENCOUNTER (OUTPATIENT)
Dept: LAB | Facility: MEDICAL CENTER | Age: 70
End: 2020-04-29
Attending: FAMILY MEDICINE
Payer: MEDICARE

## 2020-04-29 DIAGNOSIS — I48.0 PAF (PAROXYSMAL ATRIAL FIBRILLATION) (HCC): ICD-10-CM

## 2020-04-29 DIAGNOSIS — Z11.59 NEED FOR HEPATITIS C SCREENING TEST: ICD-10-CM

## 2020-04-29 LAB
BASOPHILS # BLD AUTO: 0.6 % (ref 0–1.8)
BASOPHILS # BLD: 0.03 K/UL (ref 0–0.12)
EOSINOPHIL # BLD AUTO: 0.08 K/UL (ref 0–0.51)
EOSINOPHIL NFR BLD: 1.5 % (ref 0–6.9)
ERYTHROCYTE [DISTWIDTH] IN BLOOD BY AUTOMATED COUNT: 45.5 FL (ref 35.9–50)
HCT VFR BLD AUTO: 46.8 % (ref 37–47)
HCV AB SER QL: NORMAL
HGB BLD-MCNC: 14.9 G/DL (ref 12–16)
IMM GRANULOCYTES # BLD AUTO: 0.01 K/UL (ref 0–0.11)
IMM GRANULOCYTES NFR BLD AUTO: 0.2 % (ref 0–0.9)
LYMPHOCYTES # BLD AUTO: 1.46 K/UL (ref 1–4.8)
LYMPHOCYTES NFR BLD: 27.5 % (ref 22–41)
MCH RBC QN AUTO: 31.4 PG (ref 27–33)
MCHC RBC AUTO-ENTMCNC: 31.8 G/DL (ref 33.6–35)
MCV RBC AUTO: 98.5 FL (ref 81.4–97.8)
MONOCYTES # BLD AUTO: 0.44 K/UL (ref 0–0.85)
MONOCYTES NFR BLD AUTO: 8.3 % (ref 0–13.4)
NEUTROPHILS # BLD AUTO: 3.28 K/UL (ref 2–7.15)
NEUTROPHILS NFR BLD: 61.9 % (ref 44–72)
NRBC # BLD AUTO: 0 K/UL
NRBC BLD-RTO: 0 /100 WBC
PLATELET # BLD AUTO: 272 K/UL (ref 164–446)
PMV BLD AUTO: 11 FL (ref 9–12.9)
RBC # BLD AUTO: 4.75 M/UL (ref 4.2–5.4)
WBC # BLD AUTO: 5.3 K/UL (ref 4.8–10.8)

## 2020-04-29 PROCEDURE — 85025 COMPLETE CBC W/AUTO DIFF WBC: CPT

## 2020-04-29 PROCEDURE — G0472 HEP C SCREEN HIGH RISK/OTHER: HCPCS | Mod: GA

## 2020-04-29 PROCEDURE — 36415 COLL VENOUS BLD VENIPUNCTURE: CPT

## 2020-05-04 ENCOUNTER — OFFICE VISIT (OUTPATIENT)
Dept: MEDICAL GROUP | Facility: PHYSICIAN GROUP | Age: 70
End: 2020-05-04
Payer: MEDICARE

## 2020-05-04 VITALS
RESPIRATION RATE: 16 BRPM | BODY MASS INDEX: 26.2 KG/M2 | TEMPERATURE: 98.1 F | HEIGHT: 66 IN | HEART RATE: 60 BPM | DIASTOLIC BLOOD PRESSURE: 68 MMHG | SYSTOLIC BLOOD PRESSURE: 104 MMHG | WEIGHT: 163 LBS | OXYGEN SATURATION: 91 %

## 2020-05-04 DIAGNOSIS — G47.00 INSOMNIA, UNSPECIFIED TYPE: ICD-10-CM

## 2020-05-04 DIAGNOSIS — Z95.0 CARDIAC PACEMAKER IN SITU: ICD-10-CM

## 2020-05-04 DIAGNOSIS — Z00.00 ENCOUNTER FOR MEDICARE ANNUAL WELLNESS EXAM: ICD-10-CM

## 2020-05-04 DIAGNOSIS — I48.0 PAF (PAROXYSMAL ATRIAL FIBRILLATION) (HCC): ICD-10-CM

## 2020-05-04 DIAGNOSIS — D75.89 MACROCYTOSIS WITHOUT ANEMIA: ICD-10-CM

## 2020-05-04 DIAGNOSIS — I49.5 SSS (SICK SINUS SYNDROME) (HCC): ICD-10-CM

## 2020-05-04 PROCEDURE — G0439 PPPS, SUBSEQ VISIT: HCPCS | Performed by: FAMILY MEDICINE

## 2020-05-04 ASSESSMENT — ACTIVITIES OF DAILY LIVING (ADL): BATHING_REQUIRES_ASSISTANCE: 0

## 2020-05-04 ASSESSMENT — PATIENT HEALTH QUESTIONNAIRE - PHQ9: CLINICAL INTERPRETATION OF PHQ2 SCORE: 0

## 2020-05-04 ASSESSMENT — FIBROSIS 4 INDEX: FIB4 SCORE: 1.09

## 2020-05-04 ASSESSMENT — ENCOUNTER SYMPTOMS: GENERAL WELL-BEING: GOOD

## 2020-05-04 NOTE — PROGRESS NOTES
Chief Complaint   Patient presents with   • Annual Wellness Visit       HPI:  Simona Jensen is a 69 y.o. here for Medicare Annual Wellness Visit     Patient Active Problem List    Diagnosis Date Noted   • Agatston coronary artery calcium score less than 100 07/16/2019   • Hearing problem of both ears 09/11/2017   • Vitamin D insufficiency 12/08/2015   • Hyperlipidemia 02/25/2014   • Insomnia 02/25/2014   • PAF (paroxysmal atrial fibrillation) ([facility]) 08/14/2013   • Cardiac pacemaker in situ 01/25/2012   • SSS (sick sinus syndrome) ([facility]) 01/25/2012       Current Outpatient Medications   Medication Sig Dispense Refill   • traZODone (DESYREL) 100 MG Tab TAKE 1 TABLET AT BEDTIME AS NEEDED FOR SLEEP 90 Tab 3   • flecainide (TAMBOCOR) 100 MG Tab TAKE 1 TABLET TWICE A  Tab 3   • simvastatin (ZOCOR) 40 MG Tab Take 1 Tab by mouth every evening. 90 Tab 3   • metoprolol SR (TOPROL XL) 25 MG TABLET SR 24 HR Take 25 mg by mouth every day. 90 Tab 3   • Cholecalciferol (VITAMIN D3) 2000 UNIT Cap Take 1 Cap by mouth every day. 30 Cap 0   • aspirin EC (ECOTRIN) 81 MG TBEC Take 81 mg by mouth every day.     • MULTIPLE VITAMINS PO Take 1 Tab by mouth every day.       No current facility-administered medications for this visit.             Current supplements as per medication list.       Allergies: Patient has no known allergies.    Current social contact/activities: Walking with friends      She  reports that she has never smoked. She has never used smokeless tobacco. She reports current alcohol use of about 0.5 oz of alcohol per week. She reports that she does not use drugs.  Counseling given: Yes      DPA/Advanced Directive:  Patient has Advanced Directive on file.     ROS:    Gait: Uses no assistive device  Ostomy: No  Other tubes: No  Amputations: No  Chronic oxygen use: No  Last eye exam: December -    Wears hearing aids: No   : Reports urinary leakage during the last 6 months that has not interfered at  all with their daily activities or sleep. Stress symptoms    Screening:    Depression Screening    Little interest or pleasure in doing things?  0 - not at all  Feeling down, depressed , or hopeless? 0 - not at all  Patient Health Questionnaire Score: 0     If depressive symptoms identified deferred to follow up visit unless specifically addressed in assessment and plan.    Interpretation of PHQ-9 Total Score   Score Severity   1-4 No Depression   5-9 Mild Depression   10-14 Moderate Depression   15-19 Moderately Severe Depression   20-27 Severe Depression    Screening for Cognitive Impairment    Three Minute Recall (village, kitchen, baby) 2/3    Stanislav clock face with all 12 numbers and set the hands to show 10 past 10.  Yes    Cognitive concerns identified deferred for follow up unless specifically addressed in assessment and plan.    Fall Risk Assessment    Has the patient had two or more falls in the last year or any fall with injury in the last year?  No    Safety Assessment    Throw rugs on floor.  Yes  Handrails on all stairs.  Yes  Good lighting in all hallways.  Yes  Difficulty hearing.  No  Patient counseled about all safety risks that were identified.    Functional Assessment ADLs    Are there any barriers preventing you from cooking for yourself or meeting nutritional needs?  No.    Are there any barriers preventing you from driving safely or obtaining transportation?  No.    Are there any barriers preventing you from using a telephone or calling for help?  No.    Are there any barriers preventing you from shopping?  No.    Are there any barriers preventing you from taking care of your own finances?  No.    Are there any barriers preventing you from managing your medications?  No.    Are there any barriers preventing you from showering, bathing or dressing yourself?  No.    Are you currently engaging in any exercise or physical activity?  Yes.  Walking     What is your perception of your health?   Good.      Health Maintenance Summary                Annual Wellness Visit Overdue 1950     COLONOSCOPY Overdue 10/15/2019      Previously completed 10/15/2009     IMM ZOSTER VACCINES Postponed 10/4/2020 Originally 12/10/2012. System: vaccine not available, other system reasons     Done 10/15/2012 Imm Admin: Zoster Vaccine Live (ZVL) (Zostavax)     Patient has more history with this topic...    MAMMOGRAM Next Due 1/8/2021      Done 1/8/2020 MA-SCREENING MAMMO BILAT W/TOMOSYNTHESIS W/CAD     Patient has more history with this topic...    IMM DTaP/Tdap/Td Vaccine Next Due 1/10/2021      Done 1/10/2011 Imm Admin: Dtap Vaccine    BONE DENSITY Next Due 11/9/2021      Done 11/9/2016 DS-BONE DENSITY STUDY (DEXA)          Patient Care Team:  Amanda Mccallum M.D. as PCP - General (Family Medicine)  Jonathan Malagon M.D. as Consulting Physician (Cardiology)  Alex Cruz M.D. as Consulting Physician (Surgery)  Devon Cook O.D. as Consulting Physician (Optometry)  Deon Clemente M.D. as Consulting Physician (Cardiology)        Social History     Tobacco Use   • Smoking status: Never Smoker   • Smokeless tobacco: Never Used   Substance Use Topics   • Alcohol use: Yes     Alcohol/week: 0.5 oz     Types: 1 Glasses of wine per week     Comment: social, 1 per week   • Drug use: No     Family History   Problem Relation Age of Onset   • Arrythmia Mother    • Diabetes Mother    • Cancer Mother         breast, lung   • Hypertension Mother    • Hyperlipidemia Mother    • Arrythmia Brother    • Diabetes Brother    • Hypertension Brother    • Cancer Brother         Prsotate CA   • Heart Disease Brother    • Heart Disease Father    • Cancer Maternal Aunt         breast   • Cancer Maternal Aunt         breast     She  has a past medical history of Agatston coronary artery calcium score less than 100, Cardiac pacemaker in situ (01/25/2012), Cataract, Hyperlipidemia, Menopause, Other specified disorder of intestines,  "Paroxysmal atrial fibrillation (HCC), and SSS (sick sinus syndrome) (HCC).   Past Surgical History:   Procedure Laterality Date   • BREAST BIOPSY  4/28/2014    Performed by Alex Cruz M.D. at SURGERY SAME DAY [facility] ORS   • LOW ANTERIOR RESECTION ROBOTIC  5/28/2013    Performed by Prashant Almanzar M.D. at SURGERY [facility] ORS   • COLON RESECTION  2013 8 in colectomy   • PACEMAKER INSERTION  June 2010    [employer] Altrua 60 S606 implanted by Dr. Marte, [facility].   • ABDOMINAL HYSTERECTOMY TOTAL  1981    for endometriosis, still has ovaries       Exam:   /68 (BP Location: Right arm, Patient Position: Sitting, BP Cuff Size: Adult)   Pulse 60   Temp 36.7 °C (98.1 °F) (Temporal)   Resp 16   Ht 1.676 m (5' 6\")   Wt 73.9 kg (163 lb)   SpO2 91%  Body mass index is 26.31 kg/m².    Hearing good.    Dentition good  Alert, oriented in no acute distress.  Eye contact is good, speech goal directed, affect calm    Assessment and Plan. The following treatment and monitoring plan is recommended:      1. Encounter for Medicare annual wellness exam  Simona is a pleasant 69-year-old woman here today for her Medicare annual wellness visit.  She has no acute concerns.    2. PAF (paroxysmal atrial fibrillation) (HCC)  This is a chronic condition, stable.  She is on flecainide and metoprolol and she reports it works well to control any symptoms.  She is on aspirin for anticoagulation and denies any chest pain, shortness of breath, or heart palpitations.  -Continue to follow with cardiology  -Continue metoprolol, flecainide, and aspirin    3. SSS (sick sinus syndrome) (HCC)  4. Cardiac pacemaker in situ  This is a chronic condition, stable.  She has a cardiac pacemaker in place due to sick sinus syndrome.  She has had it for approximately 10 years and reports it continues to work.  She has a pacemaker check in June and she reports that she is nearing the end of the lifetime of this current " pacemaker and will need to be replaced soon.  -Continue to follow with cardiology    5. Insomnia, unspecified type  This is a chronic condition, stable.  She has a history of insomnia and is on trazodone nightly.  She states that it helps keep her sleep.  -Continue trazodone    6. Macrocytosis without anemia  This is a new condition.  Recent labs did show macrocytosis without anemia.  She reports that she drinks alcohol very rarely so likely alcohol is not the source.  We will check a vitamin B12 and folate level to make sure there is no vitamin deficiency that could explain it.  - VITAMIN B12; Future  - FOLATE; Future      Services suggested: No services needed at this time  Health Care Screening: Age-appropriate preventive services recommended by USPTF and ACIP covered by Medicare were discussed today. Services ordered if indicated and agreed upon by the patient.  Referrals offered: Community-based lifestyle interventions to reduce health risks and promote self-management and wellness, fall prevention, nutrition, physical activity, tobacco-use cessation, weight loss, and mental health services as per orders if indicated.    Discussion today about general wellness and lifestyle habits:    · Prevent falls and reduce trip hazards; Cautioned about securing or removing rugs.  · Have a working fire alarm and carbon monoxide detector;   · Engage in regular physical activity and social activities     Follow-up: Return in about 6 months (around 11/4/2020) for Med check.

## 2020-05-04 NOTE — LETTER
[facility]"[employer] [facility]  Amanda Mccallum M.D.  [address]  [place] [address]  Fax: [phone]   Authorization for Release/Disclosure of   Protected Health Information   Name: JOAN HIRSCH : 1950 SSN: [id number]   Address: [address] [place] [address]  [place] [address] Phone:    [phone] (home)    I authorize the entity listed below to release/disclose the PHI below to:   [facility]"[employer] [facility]/Amanda Mccallum M.D. and Amanda Mccallum M.D.   Provider or Entity Name:  [facility]   Address   City, State, Zip   Phone:      Fax:     Reason for request: continuity of care   Information to be released:    [X] LAST COLONOSCOPY,  including any PATH REPORT and follow-up  [  ] LAST FIT/COLOGUARD RESULT [  ] LAST DEXA  [  ] LAST MAMMOGRAM  [  ] LAST PAP  [  ] LAST LABS [  ] RETINA EXAM REPORT  [  ] IMMUNIZATION RECORDS  [ ] Release all info      [  ] Check here and initial the line next to each item to release ALL health information INCLUDING  _____ Care and treatment for drug and / or alcohol abuse  _____ HIV testing, infection status, or AIDS  _____ Genetic Testing    DATES OF SERVICE OR TIME PERIOD TO BE DISCLOSED: _____________  I understand and acknowledge that:  * This Authorization may be revoked at any time by you in writing, except if your health information has already been used or disclosed.  * Your health information that will be used or disclosed as a result of you signing this authorization could be re-disclosed by the recipient. If this occurs, your re-disclosed health information may no longer be protected by State or Federal laws.  * You may refuse to sign this Authorization. Your refusal will not affect your ability to obtain treatment.  * This Authorization becomes effective upon signing and will  on (date) __________.      If no date is indicated, this Authorization will  one (1) year from the signature date.    Name: Joan Hirsch    Signature:   Date:     2020            PLEASE FAX REQUESTED RECORDS BACK TO: [phone]

## 2020-06-09 ENCOUNTER — NON-PROVIDER VISIT (OUTPATIENT)
Dept: CARDIOLOGY | Facility: MEDICAL CENTER | Age: 70
End: 2020-06-09
Payer: MEDICARE

## 2020-06-09 DIAGNOSIS — Z95.0 CARDIAC PACEMAKER IN SITU: ICD-10-CM

## 2020-06-09 PROCEDURE — 93280 PM DEVICE PROGR EVAL DUAL: CPT | Performed by: INTERNAL MEDICINE

## 2020-06-22 DIAGNOSIS — I48.0 PAROXYSMAL ATRIAL FIBRILLATION (HCC): ICD-10-CM

## 2020-07-02 RX ORDER — METOPROLOL SUCCINATE 25 MG/1
TABLET, EXTENDED RELEASE ORAL
Qty: 90 TAB | Refills: 1 | Status: SHIPPED | OUTPATIENT
Start: 2020-07-02 | End: 2020-07-09

## 2020-07-09 ENCOUNTER — OFFICE VISIT (OUTPATIENT)
Dept: CARDIOLOGY | Facility: MEDICAL CENTER | Age: 70
End: 2020-07-09
Payer: MEDICARE

## 2020-07-09 VITALS
HEIGHT: 66 IN | HEART RATE: 60 BPM | DIASTOLIC BLOOD PRESSURE: 58 MMHG | BODY MASS INDEX: 26.52 KG/M2 | WEIGHT: 165 LBS | OXYGEN SATURATION: 96 % | SYSTOLIC BLOOD PRESSURE: 106 MMHG

## 2020-07-09 DIAGNOSIS — Z95.0 CARDIAC PACEMAKER IN SITU: ICD-10-CM

## 2020-07-09 DIAGNOSIS — I48.0 PAROXYSMAL ATRIAL FIBRILLATION (HCC): ICD-10-CM

## 2020-07-09 DIAGNOSIS — E78.2 MIXED HYPERLIPIDEMIA: ICD-10-CM

## 2020-07-09 DIAGNOSIS — I49.5 SSS (SICK SINUS SYNDROME) (HCC): ICD-10-CM

## 2020-07-09 DIAGNOSIS — I48.0 PAF (PAROXYSMAL ATRIAL FIBRILLATION) (HCC): ICD-10-CM

## 2020-07-09 DIAGNOSIS — R93.1 AGATSTON CORONARY ARTERY CALCIUM SCORE LESS THAN 100: ICD-10-CM

## 2020-07-09 LAB — EKG IMPRESSION: NORMAL

## 2020-07-09 PROCEDURE — 93000 ELECTROCARDIOGRAM COMPLETE: CPT | Performed by: INTERNAL MEDICINE

## 2020-07-09 PROCEDURE — 99214 OFFICE O/P EST MOD 30 MIN: CPT | Performed by: INTERNAL MEDICINE

## 2020-07-09 RX ORDER — METOPROLOL SUCCINATE 25 MG/1
TABLET, EXTENDED RELEASE ORAL
Qty: 90 TAB | Refills: 3 | Status: SHIPPED | OUTPATIENT
Start: 2020-07-09 | End: 2021-09-13

## 2020-07-09 RX ORDER — SIMVASTATIN 40 MG
40 TABLET ORAL EVERY EVENING
Qty: 90 TAB | Refills: 3 | Status: SHIPPED | OUTPATIENT
Start: 2020-07-09 | End: 2021-09-13

## 2020-07-09 RX ORDER — FLECAINIDE ACETATE 100 MG/1
TABLET ORAL
Qty: 180 TAB | Refills: 3 | Status: SHIPPED | OUTPATIENT
Start: 2020-07-09 | End: 2021-07-29

## 2020-07-09 ASSESSMENT — FIBROSIS 4 INDEX: FIB4 SCORE: 1.09

## 2020-07-09 ASSESSMENT — ENCOUNTER SYMPTOMS: INSOMNIA: 1

## 2020-07-09 NOTE — PROGRESS NOTES
Cardiology Follow-up Consultation Note    Date of note:    7/9/2020  Primary Care Provider: Pcp Pt States None  Referring Provider: Micky Patel M.D.     Patient Name: Simona Jensen   YOB: 1950  MRN:              [id number]    Chief Complaint: atrial fibrillation    History of Present Illness: Simona Jensen is a 69 y.o. female whose current medical problems include dyslipidemia, sick sinus syndrome status post pacemaker implantation ([employer], 6/2010), and paroxysmal atrial fibrillation rhythm controlled with flecainide who is here for follow-up.    At our visit, 7/16/2019:  In terms of pacemaker, v paced 28%, a paced 81%.     Interim Events:  In terms of pacemaker, v paced 47%, a paced 99%.     In terms of atrial fibrillation, no palpitations.       Review of Systems   Psychiatric/Behavioral: The patient has insomnia.    Constitution: Negative for chills, fever and night sweats.   HENT: Negative for nosebleeds.    Eyes: Negative for vision loss in left eye and vision loss in right eye.   Respiratory: Negative for hemoptysis.    Gastrointestinal: Negative for hematemesis, hematochezia and melena.   Genitourinary: Negative for hematuria.   Neurological: Negative for focal weakness, numbness and paresthesias.         All other systems reviewed and discussed using a comprehensive questionnaire and are negative.     Past Medical History:   Diagnosis Date   • Agatston coronary artery calcium score less than 100    • Cardiac pacemaker in situ 01/25/2012 June 2010: [employer] Altrua 60 [id number] implanted by Dr. Marte, [facility].    • Cataract     OU   • Hyperlipidemia    • Menopause    • Other specified disorder of intestines     Divertuculosis   • Paroxysmal atrial fibrillation (HCC)     Treated with Flecainide   • SSS (sick sinus syndrome) (HCC)          Past Surgical History:   Procedure Laterality Date   • BREAST BIOPSY  4/28/2014    Performed by  Alex Cruz M.D. at SURGERY SAME DAY [facility] ORS   • LOW ANTERIOR RESECTION ROBOTIC  5/28/2013    Performed by Prashant Almanzar M.D. at SURGERY [facility] ORS   • COLON RESECTION  2013 8 in colectomy   • PACEMAKER INSERTION  June 2010    [employer] Altrua 60 [id number] implanted by Dr. Marte, [facility].   • ABDOMINAL HYSTERECTOMY TOTAL  1981    for endometriosis, still has ovaries         Current Outpatient Medications   Medication Sig Dispense Refill   • metoprolol SR (TOPROL XL) 25 MG TABLET SR 24 HR TAKE 1 TABLET DAILY 90 Tab 1   • traZODone (DESYREL) 100 MG Tab TAKE 1 TABLET AT BEDTIME AS NEEDED FOR SLEEP 90 Tab 3   • flecainide (TAMBOCOR) 100 MG Tab TAKE 1 TABLET TWICE A  Tab 3   • simvastatin (ZOCOR) 40 MG Tab Take 1 Tab by mouth every evening. 90 Tab 3   • Cholecalciferol (VITAMIN D3) 2000 UNIT Cap Take 1 Cap by mouth every day. 30 Cap 0   • aspirin EC (ECOTRIN) 81 MG TBEC Take 81 mg by mouth every day.     • MULTIPLE VITAMINS PO Take 1 Tab by mouth every day.       No current facility-administered medications for this visit.          No Known Allergies      Family History   Problem Relation Age of Onset   • Arrythmia Mother    • Diabetes Mother    • Cancer Mother         breast, lung   • Hypertension Mother    • Hyperlipidemia Mother    • Arrythmia Brother    • Diabetes Brother    • Hypertension Brother    • Cancer Brother         Prsotate CA   • Heart Disease Brother    • Heart Disease Father    • Cancer Maternal Aunt         breast   • Cancer Maternal Aunt         breast         Social History     Socioeconomic History   • Marital status:      Spouse name: Not on file   • Number of children: Not on file   • Years of education: Not on file   • Highest education level: Not on file   Occupational History   • Not on file   Social Needs   • Financial resource strain: Not on file   • Food insecurity     Worry: Not on file     Inability: Not on file   • Transportation needs      "Medical: Not on file     Non-medical: Not on file   Tobacco Use   • Smoking status: Never Smoker   • Smokeless tobacco: Never Used   Substance and Sexual Activity   • Alcohol use: Yes     Alcohol/week: 0.5 oz     Types: 1 Glasses of wine per week     Comment: social, 1 per week   • Drug use: No   • Sexual activity: Not Currently     Partners: Male   Lifestyle   • Physical activity     Days per week: Not on file     Minutes per session: Not on file   • Stress: Not on file   Relationships   • Social connections     Talks on phone: Not on file     Gets together: Not on file     Attends Shinto service: Not on file     Active member of club or organization: Not on file     Attends meetings of clubs or organizations: Not on file     Relationship status: Not on file   • Intimate partner violence     Fear of current or ex partner: Not on file     Emotionally abused: Not on file     Physically abused: Not on file     Forced sexual activity: Not on file   Other Topics Concern   • Not on file   Social History Narrative    Retired .          Physical Exam:  Ambulatory Vitals  /58 (BP Location: Left arm, Patient Position: Sitting)   Pulse 60   Ht 1.676 m (5' 6\")   Wt 74.8 kg (165 lb)   SpO2 96%    Oxygen Therapy:  Pulse Oximetry: 96 %  BP Readings from Last 4 Encounters:   07/09/20 106/58   05/04/20 104/68   10/31/19 110/64   07/16/19 130/82       Weight/BMI: Body mass index is 26.63 kg/m².  Wt Readings from Last 4 Encounters:   07/09/20 74.8 kg (165 lb)   05/04/20 73.9 kg (163 lb)   10/31/19 69.9 kg (154 lb)   07/16/19 67.3 kg (148 lb 6.4 oz)       General: Well appearing and in no apparent distress  Eyes: nl conjunctiva  ENT: OP covered by mask, normal external appearance of nose and ears  Neck: JVP <8 cm H2O, no carotid bruits  Lungs: normal respiratory effort, CTAB  Heart: RRR, no murmurs, no rubs or gallops, no edema bilateral lower extremities. No LV/RV heave on cardiac palpatation. 2+ " bilateral radial pulses.  2+ bilateral dp pulses.   Abdomen: soft, non tender, non distended, no masses, normal bowel sounds.  No HSM.  Extremities/MSK: no clubbing, no cyanosis  Neurological: No focal sensory deficits  Psychiatric: Appropriate affect, A/O x 3, intact judgement and insight  Skin: Warm extremities    Exam repeated in full and unchanged except for as noted above.    Lab Data Review:  Lab Results   Component Value Date/Time    CHOLSTRLTOT 306 (H) 10/21/2019 10:59 AM     (H) 10/21/2019 10:59 AM    HDL 65 10/21/2019 10:59 AM    TRIGLYCERIDE 201 (H) 10/21/2019 10:59 AM       Lab Results   Component Value Date/Time    SODIUM 142 10/21/2019 10:59 AM    POTASSIUM 4.2 10/21/2019 10:59 AM    CHLORIDE 106 10/21/2019 10:59 AM    CO2 26 10/21/2019 10:59 AM    GLUCOSE 76 10/21/2019 10:59 AM    BUN 23 (H) 10/21/2019 10:59 AM    CREATININE 0.88 10/21/2019 10:59 AM     Lab Results   Component Value Date/Time    ALKPHOSPHAT 75 09/04/2018 12:45 PM    ASTSGOT 21 09/04/2018 12:45 PM    ALTSGPT 23 09/04/2018 12:45 PM    TBILIRUBIN 0.6 09/04/2018 12:45 PM      Lab Results   Component Value Date/Time    WBC 5.3 04/29/2020 12:47 PM     No components found for: HBGA1C  No components found for: TROPONIN  No components found for: BNP      Cardiac Imaging and Procedures Review:    EKG dated 7/16/2019 : My personal interpretation is a paced, non-specific IVCD, QRS 110ms, low voltage, nl QT. QRS duration minimally changed from 2014 EKG.       Radiology test Review:  CXR: 3/2019  There is a left-sided pacer with leads projecting over the right atrium and right ventricle. The heart is not enlarged. No focal consolidation, pleural effusion or pneumothorax is identified.  Costophrenic angles are clear. Degenerative changes are seen   in the spine.    CCS 2/21/2019:  FINDINGS:    Coronary calcification:  LMA - 0.0  LCX - 0.0  LAD - 5.9  RCA - 0.0    Total Calcium Score: 5.9    Percentile: Calcium score is above the 25th  percentile for the patient's age and sex.    Other findings:  Heart: Normal size.  Lungs: Clear.  Mediastinum: Normal.  Upper abdomen: Normal.      Medical Decision Makin. SSS (sick sinus syndrome) ([facility])  F/u with pacemaker clinic as needed    2. PAF (paroxysmal atrial fibrillation) ([facility])  Asymptomatic.  -continue flecainide  -continue metoprolol XL 25mg PO Daily  -[id number] score of 2, no NOAC indicated at this time, continue aspirin.    3. Mixed hyperlipidemia  Severe and possibly familial off statin. Low CCS.   -contiue simvastatin 40mg PO daily    4. Agatston coronary artery calcium score less than 100  No need to recheck as she will remain on statin.     Return in about 1 year (around 2021).      Deon Clemente MD, [facility]  [facility], [address]  [address] [address]  Phone: [phone]  Fax: [phone]

## 2020-09-04 ENCOUNTER — NON-PROVIDER VISIT (OUTPATIENT)
Dept: CARDIOLOGY | Facility: MEDICAL CENTER | Age: 70
End: 2020-09-04
Payer: MEDICARE

## 2020-09-04 ENCOUNTER — TELEPHONE (OUTPATIENT)
Dept: CARDIOLOGY | Facility: MEDICAL CENTER | Age: 70
End: 2020-09-04

## 2020-09-04 DIAGNOSIS — I49.5 SSS (SICK SINUS SYNDROME) (HCC): ICD-10-CM

## 2020-09-04 DIAGNOSIS — Z95.0 CARDIAC PACEMAKER IN SITU: ICD-10-CM

## 2020-09-04 PROCEDURE — 93280 PM DEVICE PROGR EVAL DUAL: CPT | Performed by: INTERNAL MEDICINE

## 2020-09-04 NOTE — TELEPHONE ENCOUNTER
Patient's device @ [facility]-- please call to schedule for gen change for 4-6 weeks.  She has a dual chamber [employer] pacemaker.    Thanks,  Samantha

## 2020-09-10 NOTE — TELEPHONE ENCOUNTER
Tried to call patient to schedule. Her phone isn't accepting calls from my phone number. [employer] message sent to patient requesting she call and ask for me to schedule this procedure.

## 2020-09-15 NOTE — TELEPHONE ENCOUNTER
Patient scheduled for PM gen change on 10-16-20 at [facility] with Dr. Scanlon.

## 2020-10-12 ENCOUNTER — PRE-ADMISSION TESTING (OUTPATIENT)
Dept: ADMISSIONS | Facility: MEDICAL CENTER | Age: 70
End: 2020-10-12
Attending: INTERNAL MEDICINE
Payer: MEDICARE

## 2020-10-12 DIAGNOSIS — Z01.812 PRE-OPERATIVE LABORATORY EXAMINATION: ICD-10-CM

## 2020-10-12 DIAGNOSIS — Z01.810 PRE-OPERATIVE CARDIOVASCULAR EXAMINATION: ICD-10-CM

## 2020-10-12 LAB
ALBUMIN SERPL BCP-MCNC: 4.5 G/DL (ref 3.2–4.9)
ALBUMIN/GLOB SERPL: 1.6 G/DL
ALP SERPL-CCNC: 87 U/L (ref 30–99)
ALT SERPL-CCNC: 15 U/L (ref 2–50)
ANION GAP SERPL CALC-SCNC: 9 MMOL/L (ref 7–16)
AST SERPL-CCNC: 16 U/L (ref 12–45)
BILIRUB SERPL-MCNC: 0.4 MG/DL (ref 0.1–1.5)
BUN SERPL-MCNC: 20 MG/DL (ref 8–22)
CALCIUM SERPL-MCNC: 10 MG/DL (ref 8.5–10.5)
CHLORIDE SERPL-SCNC: 104 MMOL/L (ref 96–112)
CO2 SERPL-SCNC: 28 MMOL/L (ref 20–33)
COVID ORDER STATUS COVID19: NORMAL
CREAT SERPL-MCNC: 0.82 MG/DL (ref 0.5–1.4)
EKG IMPRESSION: NORMAL
ERYTHROCYTE [DISTWIDTH] IN BLOOD BY AUTOMATED COUNT: 44.4 FL (ref 35.9–50)
GLOBULIN SER CALC-MCNC: 2.9 G/DL (ref 1.9–3.5)
GLUCOSE SERPL-MCNC: 82 MG/DL (ref 65–99)
HCT VFR BLD AUTO: 45.7 % (ref 37–47)
HGB BLD-MCNC: 15.3 G/DL (ref 12–16)
INR PPP: 0.88 (ref 0.87–1.13)
MCH RBC QN AUTO: 32.1 PG (ref 27–33)
MCHC RBC AUTO-ENTMCNC: 33.5 G/DL (ref 33.6–35)
MCV RBC AUTO: 96 FL (ref 81.4–97.8)
PLATELET # BLD AUTO: 252 K/UL (ref 164–446)
PMV BLD AUTO: 10.9 FL (ref 9–12.9)
POTASSIUM SERPL-SCNC: 4.6 MMOL/L (ref 3.6–5.5)
PROT SERPL-MCNC: 7.4 G/DL (ref 6–8.2)
PROTHROMBIN TIME: 12.3 SEC (ref 12–14.6)
RBC # BLD AUTO: 4.76 M/UL (ref 4.2–5.4)
SARS-COV-2 RNA RESP QL NAA+PROBE: NOTDETECTED
SODIUM SERPL-SCNC: 141 MMOL/L (ref 135–145)
SPECIMEN SOURCE: NORMAL
WBC # BLD AUTO: 5.4 K/UL (ref 4.8–10.8)

## 2020-10-12 PROCEDURE — 85610 PROTHROMBIN TIME: CPT

## 2020-10-12 PROCEDURE — 80053 COMPREHEN METABOLIC PANEL: CPT

## 2020-10-12 PROCEDURE — 93010 ELECTROCARDIOGRAM REPORT: CPT | Performed by: INTERNAL MEDICINE

## 2020-10-12 PROCEDURE — U0003 INFECTIOUS AGENT DETECTION BY NUCLEIC ACID (DNA OR RNA); SEVERE ACUTE RESPIRATORY SYNDROME CORONAVIRUS 2 (SARS-COV-2) (CORONAVIRUS DISEASE [COVID-19]), AMPLIFIED PROBE TECHNIQUE, MAKING USE OF HIGH THROUGHPUT TECHNOLOGIES AS DESCRIBED BY CMS-2020-01-R: HCPCS

## 2020-10-12 PROCEDURE — 85027 COMPLETE CBC AUTOMATED: CPT

## 2020-10-12 PROCEDURE — 93005 ELECTROCARDIOGRAM TRACING: CPT

## 2020-10-12 PROCEDURE — 36415 COLL VENOUS BLD VENIPUNCTURE: CPT

## 2020-10-15 NOTE — OR NURSING
COVID-19 Pre-Surgery Screenin. Do you have an undiagnosed respiratory illness or symptoms such as coughing or sneezing? no     • Onset of Sx: n/a    • Acute vs. chronic respiratory illness: n/a     2. Do you have an unexplained fever greater than 100.4 degrees Fahrenheit or 38 degrees Celsius? no  ?  3. Have you had direct exposure to a patient who tested positive for Covid-19? no    4. Patient informed of current visitation and mask policies by this RN.

## 2020-10-16 ENCOUNTER — HOSPITAL ENCOUNTER (OUTPATIENT)
Facility: MEDICAL CENTER | Age: 70
End: 2020-10-16
Attending: INTERNAL MEDICINE | Admitting: INTERNAL MEDICINE
Payer: MEDICARE

## 2020-10-16 ENCOUNTER — APPOINTMENT (OUTPATIENT)
Dept: CARDIOLOGY | Facility: MEDICAL CENTER | Age: 70
End: 2020-10-16
Attending: INTERNAL MEDICINE
Payer: MEDICARE

## 2020-10-16 VITALS
DIASTOLIC BLOOD PRESSURE: 67 MMHG | RESPIRATION RATE: 16 BRPM | OXYGEN SATURATION: 94 % | HEIGHT: 66 IN | WEIGHT: 164.68 LBS | SYSTOLIC BLOOD PRESSURE: 118 MMHG | HEART RATE: 60 BPM | BODY MASS INDEX: 26.47 KG/M2 | TEMPERATURE: 97.3 F

## 2020-10-16 DIAGNOSIS — I49.5 SSS (SICK SINUS SYNDROME) (HCC): ICD-10-CM

## 2020-10-16 PROCEDURE — 160002 HCHG RECOVERY MINUTES (STAT)

## 2020-10-16 PROCEDURE — 99153 MOD SED SAME PHYS/QHP EA: CPT

## 2020-10-16 PROCEDURE — 700102 HCHG RX REV CODE 250 W/ 637 OVERRIDE(OP)

## 2020-10-16 PROCEDURE — 99152 MOD SED SAME PHYS/QHP 5/>YRS: CPT | Performed by: INTERNAL MEDICINE

## 2020-10-16 PROCEDURE — A9270 NON-COVERED ITEM OR SERVICE: HCPCS

## 2020-10-16 PROCEDURE — 700101 HCHG RX REV CODE 250

## 2020-10-16 PROCEDURE — 33228 REMV&REPLC PM GEN DUAL LEAD: CPT | Performed by: INTERNAL MEDICINE

## 2020-10-16 PROCEDURE — 700111 HCHG RX REV CODE 636 W/ 250 OVERRIDE (IP)

## 2020-10-16 RX ORDER — LIDOCAINE HYDROCHLORIDE 20 MG/ML
INJECTION, SOLUTION INFILTRATION; PERINEURAL
Status: COMPLETED
Start: 2020-10-16 | End: 2020-10-16

## 2020-10-16 RX ORDER — CEFAZOLIN SODIUM 1 G/3ML
INJECTION, POWDER, FOR SOLUTION INTRAMUSCULAR; INTRAVENOUS
Status: COMPLETED
Start: 2020-10-16 | End: 2020-10-16

## 2020-10-16 RX ORDER — ACETAMINOPHEN 325 MG/1
TABLET ORAL
Status: COMPLETED
Start: 2020-10-16 | End: 2020-10-16

## 2020-10-16 RX ORDER — MIDAZOLAM HYDROCHLORIDE 1 MG/ML
INJECTION INTRAMUSCULAR; INTRAVENOUS
Status: COMPLETED
Start: 2020-10-16 | End: 2020-10-16

## 2020-10-16 RX ORDER — DOXYCYCLINE 100 MG/1
100 CAPSULE ORAL 2 TIMES DAILY
Qty: 8 CAP | Refills: 0 | Status: SHIPPED | OUTPATIENT
Start: 2020-10-16 | End: 2020-10-22

## 2020-10-16 RX ORDER — TRAMADOL HYDROCHLORIDE 50 MG/1
50 TABLET ORAL EVERY 6 HOURS PRN
Status: DISCONTINUED | OUTPATIENT
Start: 2020-10-16 | End: 2020-10-16 | Stop reason: HOSPADM

## 2020-10-16 RX ORDER — ACETAMINOPHEN 325 MG/1
650 TABLET ORAL EVERY 4 HOURS PRN
Status: DISCONTINUED | OUTPATIENT
Start: 2020-10-16 | End: 2020-10-16 | Stop reason: HOSPADM

## 2020-10-16 RX ADMIN — MIDAZOLAM HYDROCHLORIDE 2 MG: 1 INJECTION, SOLUTION INTRAMUSCULAR; INTRAVENOUS at 08:11

## 2020-10-16 RX ADMIN — FENTANYL CITRATE 100 MCG: 50 INJECTION INTRAMUSCULAR; INTRAVENOUS at 08:11

## 2020-10-16 RX ADMIN — CEFAZOLIN 2000 MG: 330 INJECTION, POWDER, FOR SOLUTION INTRAMUSCULAR; INTRAVENOUS at 07:56

## 2020-10-16 RX ADMIN — CEFAZOLIN 1000 MG: 330 INJECTION, POWDER, FOR SOLUTION INTRAMUSCULAR; INTRAVENOUS at 07:56

## 2020-10-16 RX ADMIN — LIDOCAINE HYDROCHLORIDE: 20 INJECTION, SOLUTION INFILTRATION; PERINEURAL at 07:56

## 2020-10-16 RX ADMIN — ACETAMINOPHEN 650 MG: 325 TABLET, FILM COATED ORAL at 10:04

## 2020-10-16 RX ADMIN — MIDAZOLAM HYDROCHLORIDE 1.5 MG: 1 INJECTION, SOLUTION INTRAMUSCULAR; INTRAVENOUS at 08:21

## 2020-10-16 RX ADMIN — ACETAMINOPHEN 650 MG: 325 TABLET ORAL at 10:04

## 2020-10-16 ASSESSMENT — FIBROSIS 4 INDEX: FIB4 SCORE: 1.13

## 2020-10-16 NOTE — DISCHARGE INSTRUCTIONS
ACTIVITY: Rest and take it easy for the first 24 hours.  A responsible adult is recommended to remain with you during that time.  It is normal to feel sleepy.  We encourage you to not do anything that requires balance, judgment or coordination.    MILD FLU-LIKE SYMPTOMS ARE NORMAL. YOU MAY EXPERIENCE GENERALIZED MUSCLE ACHES, THROAT IRRITATION, HEADACHE AND/OR SOME NAUSEA.    FOR 24 HOURS DO NOT:  Drive, operate machinery or run household appliances.  Drink beer or alcoholic beverages.   Make important decisions or sign legal documents.    SPECIAL INSTRUCTIONS:     Pacemaker Battery Change, Care After  This sheet gives you information about how to care for yourself after your procedure. Your health care provider may also give you more specific instructions. If you have problems or questions, contact your health care provider.  What can I expect after the procedure?  After your procedure, it is common to have:  · Pain or soreness at the site where the pacemaker was inserted.  · Swelling at the site where the pacemaker was inserted.  Follow these instructions at home:  Incision care    · Keep the incision clean and dry.  ? Do not take baths, swim, or use a hot tub until your health care provider approves.  ? You may shower the day after your procedure, or as directed by your health care provider.  ? Pat the area dry with a clean towel. Do not rub the area. This may cause bleeding.  · Follow instructions from your health care provider about how to take care of your incision. Make sure you:  ? Wash your hands with soap and water before you change your bandage (dressing). If soap and water are not available, use hand .  ? Change your dressing as told by your health care provider.  ? Leave stitches (sutures), skin glue, or adhesive strips in place. These skin closures may need to stay in place for 2 weeks or longer. If adhesive strip edges start to loosen and curl up, you may trim the loose edges. Do not remove  adhesive strips completely unless your health care provider tells you to do that.  · Check your incision area every day for signs of infection. Check for:  ? More redness, swelling, or pain.  ? More fluid or blood.  ? Warmth.  ? Pus or a bad smell.  Activity  · Do not lift anything that is heavier than 10 lb (4.5 kg) until your health care provider says it is okay to do so.  · For the first 2 weeks, or as long as told by your health care provider:  ? Avoid lifting your left arm higher than your shoulder.  ? Be gentle when you move your arms over your head. It is okay to raise your arm to comb your hair.  ? Avoid strenuous exercise.  · Ask your health care provider when it is okay to:  ? Resume your normal activities.  ? Return to work or school.  ? Resume sexual activity.  Eating and drinking  · Eat a heart-healthy diet. This should include plenty of fresh fruits and vegetables, whole grains, low-fat dairy products, and lean protein like chicken and fish.  · Limit alcohol intake to no more than 1 drink a day for non-pregnant women and 2 drinks a day for men. One drink equals 12 oz of beer, 5 oz of wine, or 1½ oz of hard liquor.  · Check ingredients and nutrition facts on packaged foods and beverages. Avoid the following types of food:  ? Food that is high in salt (sodium).  ? Food that is high in saturated fat, like full-fat dairy or red meat.  ? Food that is high in trans fat, like fried food.  ? Food and drinks that are high in sugar.  Lifestyle  · Do not use any products that contain nicotine or tobacco, such as cigarettes and e-cigarettes. If you need help quitting, ask your health care provider.  · Take steps to manage and control your weight.  · Get regular exercise. Aim for 150 minutes of moderate-intensity exercise (such as walking or yoga) or 75 minutes of vigorous exercise (such as running or swimming) each week.  · Manage other health problems, such as diabetes or high blood pressure. Ask your health care  provider how you can manage these conditions.  General instructions  · Do not drive for 24 hours after your procedure if you were given a medicine to help you relax (sedative).  · Take over-the-counter and prescription medicines only as told by your health care provider.  · Avoid putting pressure on the area where the pacemaker was placed.  · If you need an MRI after your pacemaker has been placed, be sure to tell the health care provider who orders the MRI that you have a pacemaker.  · Avoid close and prolonged exposure to electrical devices that have strong magnetic fields. These include:  ? Cell phones. Avoid keeping them in a pocket near the pacemaker, and try using the ear opposite the pacemaker.  ? MP3 players.  ? Household appliances, like microwaves.  ? Metal detectors.  ? Electric generators.  ? High-tension wires.  · Keep all follow-up visits as directed by your health care provider. This is important.  Contact a health care provider if:  · You have pain at the incision site that is not relieved by over-the-counter or prescription medicines.  · You have any of these around your incision site or coming from it:  ? More redness, swelling, or pain.  ? Fluid or blood.  ? Warmth to the touch.  ? Pus or a bad smell.  · You have a fever.  · You feel brief, occasional palpitations, light-headedness, or any symptoms that you think might be related to your heart.  Get help right away if:  · You experience chest pain that is different from the pain at the pacemaker site.  · You develop a red streak that extends above or below the incision site.  · You experience shortness of breath.  · You have palpitations or an irregular heartbeat.  · You have light-headedness that does not go away quickly.  · You faint or have dizzy spells.  · Your pulse suddenly drops or increases rapidly and does not return to normal.  · You begin to gain weight and your legs and ankles swell.  Summary  · After your procedure, it is common to  have pain, soreness, and some swelling where the pacemaker was inserted.  · Make sure to keep your incision clean and dry. Follow instructions from your health care provider about how to take care of your incision.  · Check your incision every day for signs of infection, such as more pain or swelling, pus or a bad smell, warmth, or leaking fluid and blood.  · Avoid strenuous exercise and lifting your left arm higher than your shoulder for 2 weeks, or as long as told by your health care provider.  This information is not intended to replace advice given to you by your health care provider. Make sure you discuss any questions you have with your health care provider.  Document Released: 10/08/2014 Document Revised: 11/30/2018 Document Reviewed: 11/09/2017  [employer] Patient Education © 2020 [employer]    DIET: To avoid nausea, slowly advance diet as tolerated, avoiding spicy or greasy foods for the first day.  Add more substantial food to your diet according to your physician's instructions. INCREASE FLUIDS AND FIBER TO AVOID CONSTIPATION.    SURGICAL DRESSING/BATHING: Keep dressing and incision dry and intact for 7 days. Sponge bath only for 7 days. May remove dressing and shower after your follow up appointment. If steri strips in place underneath outer dressing, please allow to fall off on their own.     FOLLOW-UP APPOINTMENT:  A follow-up appointment should be arranged with the device clinic, [phone]; call to schedule.    You should CALL YOUR PHYSICIAN if you develop:  Fever greater than 101 degrees F.  Pain not relieved by medication, or persistent nausea or vomiting.  Excessive bleeding (blood soaking through dressing) or unexpected drainage from the wound.  Extreme redness or swelling around the incision site, drainage of pus or foul smelling drainage.  Inability to urinate or empty your bladder within 8 hours.  Problems with breathing or chest pain.    You should call 911 if you develop problems with  breathing or chest pain.  If you are unable to contact your doctor or surgical center, you should go to the nearest emergency room or urgent care center.  Physician's telephone #: [phone]    If any questions arise, call your doctor.  If your doctor is not available, please feel free to call the Surgical Center at [phone]. The Contact Center is open Monday through Friday 7AM to 5PM and may speak to a nurse at [phone], or toll free at [phone].     A registered nurse may call you a few days after your surgery to see how you are doing after your procedure.    MEDICATIONS: Resume taking daily medication.  Take prescribed pain medication with food.  If no medication is prescribed, you may take non-aspirin pain medication if needed.  PAIN MEDICATION CAN BE VERY CONSTIPATING.  Take a stool softener or laxative such as senokot, pericolace, or milk of magnesia if needed.    If your physician has prescribed pain medication that includes Acetaminophen (Tylenol), do not take additional Acetaminophen (Tylenol) while taking the prescribed medication.    Depression / Suicide Risk    As you are discharged from this [facility] beds at the [facility] facility, it is important to learn how to keep safe from harming yourself.    Recognize the warning signs:  · Abrupt changes in personality, positive or negative- including increase in energy   · Giving away possessions  · Change in eating patterns- significant weight changes-  positive or negative  · Change in sleeping patterns- unable to sleep or sleeping all the time   · Unwillingness or inability to communicate  · Depression  · Unusual sadness, discouragement and loneliness  · Talk of wanting to die  · Neglect of personal appearance   · Rebelliousness- reckless behavior  · Withdrawal from people/activities they love  · Confusion- inability to concentrate     If you or a loved one observes any of these behaviors or has concerns about self-harm, here's what you can do:  · Talk about it-  your feelings and reasons for harming yourself  · Remove any means that you might use to hurt yourself (examples: pills, rope, extension cords, firearm)  · Get professional help from the community (Mental Health, Substance Abuse, psychological counseling)  · Do not be alone:Call your Safe Contact- someone whom you trust who will be there for you.  · Call your local CRISIS HOTLINE 692-3336 or 128-259-0003  · Call your local Children's Mobile Crisis Response Team [place] (388) 179-8574 or www.BRAND-YOURSELF  · Call the toll free National Suicide Prevention Hotlines   · National Suicide Prevention Lifeline 786-052-FENX (3310)  · National Hope Line Network 800-SUICIDE (109-1011)

## 2020-10-16 NOTE — OP REPORT
Electrophysiology Procedure Note  [facility]    PROCEDURE: Dual-chamber pacemaker generator change,   moderate sedation administered by RN and supervised by physician    : John Scanlon M.D.    ANESTHESIA: Moderate sedation,  start time 758, stop time 834  the moderate sedation document has been reviewed, signed and scanned into media     ESTIMATED BLOOD LOSS: 20 cc.    SPECIMENS: None.    COMPLICATIONS: None    INDICATION: Device at [facility], sick sinus syndrome    PRE-PROCEDURE ECG: Atrial pacing    POST-PROCEDURE ECG: Atrial pacing    DESCRIPTION OF PROCEDURE: After informed written consent, the patient was brought to the electrophysiology lab in the fasting, unsedated state. The patient was prepped and draped in the usual sterile fashion. The procedure was performed under moderate sedation with local anesthetic. An incision was made with a scalpel along the old scar. Access to the device pocket was made using a combination of blunt dissection and electrocautery. The old generator and leads were freed from adhesions and the generator disconnected from the leads.  Pocket was made directly on the muscle and slightly more medial. Leads tested fine.  The pocket was irrigated with antibiotic solution, and the new generator was connected to the leads and inserted back in the pocket. The wound was closed with three layers of absorbable sutures and covered with Steri-Strips.   I personally supervised the administration of moderate sedation by the RN and observed the level of consciousness and physiologic status throughout the procedure.  Following recovery from sedation, the patient was transferred to a monitored bed in good condition.    IMPLANTED DEVICE INFORMATION:    Pulse generator is a [employer] model L301  Serial number [id number]     LEAD INFORMATION:  1. Right atrial lead is a [employer] model 4135, serial number [id number], P wave 4.1 millivolts, threshold 0.9 volts, pacing impedance 384 ohms, implant  date 6/3/2010  2. Right ventricular lead is a [employer] model 4136, serial number [id number], R wave 17 millivolts, threshold 0.4 volts, pacing impedance 788 ohms, implant date 6/3/2010    DEVICE PROGRAMMING:    As previous programmed     IMPRESSIONS:  1. Successful dual-chamber pacemaker generator change.    RECOMMENDATIONS:  1. Transfer to PPU.  2. Follow-up in device clinic for wound check and device interrogation.

## 2020-10-16 NOTE — H&P
Physician H&P    Patient ID:  Simona Jensen  [id number]  70 y.o. female  1950    History:  Primary Diagnosis: For generator change    HPI:  Placed in [place] for SSS. PAF. No constitutional symptoms. CHADS vasc 2. At [facility]    Past Medical History:  has a past medical history of Agatston coronary artery calcium score less than 100, Cardiac pacemaker in situ (01/25/2012), Cataract, High cholesterol, Hyperlipidemia, Menopause, Other specified disorder of intestines, Paroxysmal atrial fibrillation (HCC), and SSS (sick sinus syndrome) (HCC).  Past Surgical History:  has a past surgical history that includes pacemaker insertion (June 2010); low anterior resection robotic (5/28/2013); colon resection (2013); abdominal hysterectomy total (1981); and breast biopsy (4/28/2014).  Past Social History:  reports that she has never smoked. She has never used smokeless tobacco. She reports current alcohol use of about 0.5 oz of alcohol per week. She reports that she does not use drugs.  Past Family History:   Family History   Problem Relation Age of Onset   • Arrythmia Mother    • Diabetes Mother    • Cancer Mother         breast, lung   • Hypertension Mother    • Hyperlipidemia Mother    • Arrythmia Brother    • Diabetes Brother    • Hypertension Brother    • Cancer Brother         Prsotate CA   • Heart Disease Brother    • Heart Disease Father    • Cancer Maternal Aunt         breast   • Cancer Maternal Aunt         breast     Allergies: Patient has no known allergies.    Current Medications:  Prior to Admission medications    Medication Sig Start Date End Date Taking? Authorizing Provider   simvastatin (ZOCOR) 40 MG Tab Take 1 Tab by mouth every evening. 7/9/20   Deon Clemente M.D.   metoprolol SR (TOPROL XL) 25 MG TABLET SR 24 HR TAKE 1 TABLET DAILY  Patient taking differently: Take 25 mg by mouth every bedtime. TAKE 1 TABLET DAILY 7/9/20   Deon Clemente M.D.   flecainide (TAMBOCOR) 100 MG Tab TAKE 1  "TABLET TWICE A DAY 7/9/20   Deon Clemente M.D.   traZODone (DESYREL) 100 MG Tab TAKE 1 TABLET AT BEDTIME AS NEEDED FOR SLEEP 2/18/20   Amanda Mccallum M.D.   Cholecalciferol (VITAMIN D3) 2000 UNIT Cap Take 1 Cap by mouth every day.  Patient taking differently: Take 1 Cap by mouth every morning. 12/20/18   Amanda Mccallum M.D.   aspirin EC (ECOTRIN) 81 MG TBEC Take 81 mg by mouth every morning.    Physician Outpatient   MULTIPLE VITAMINS PO Take 1 Tab by mouth every morning.    Physician Outpatient       Review of Systems:  ROS  /61   Pulse 60   Temp 36.6 °C (97.9 °F) (Temporal)   Resp 20   Ht 1.676 m (5' 6\")   Wt 74.7 kg (164 lb 10.9 oz)   SpO2 94%     Physical Examination:  Physical Exam  Vitals signs reviewed.   Constitutional:       General: She is not in acute distress.     Appearance: She is well-developed. She is not diaphoretic.   Eyes:      Conjunctiva/sclera: Conjunctivae normal.   Neck:      Thyroid: No thyroid mass or thyromegaly.      Vascular: No JVD.      Trachea: No tracheal deviation.   Cardiovascular:      Rate and Rhythm: Normal rate and regular rhythm.      Heart sounds: No murmur.   Pulmonary:      Effort: Pulmonary effort is normal. No respiratory distress.      Breath sounds: Normal breath sounds.   Chest:      Chest wall: No tenderness.   Abdominal:      Palpations: Abdomen is soft.      Tenderness: There is no abdominal tenderness.   Musculoskeletal: Normal range of motion.   Skin:     General: Skin is warm and dry.   Neurological:      Mental Status: She is alert and oriented to person, place, and time.      Motor: No tremor.   Psychiatric:         Behavior: Behavior normal.         Impression:  1. SSS s/p PPM for generator change.  The risks, benefits, and alternatives to permanent pacemaker replacement were discussed in great detail.  Specific risks mentioned to the patient including bleeding, cardiac perforation with possible tamponade possibly requiring " pericardiocentesis or open heart surgery.  In addition the possibility of lead dislodgment (2-3%), pneumothorax (3%), hemothorax, infection were discussed.  Also, mentioned were the risk of death, stroke, and myocardial infarction.  The patient verbalized understanding of the potential complications and wishes to proceed with the procedure.      Pre Procedure Assessment:  Pre-Procedure Assessment - Applicable for patients receiving sedation by non-anesthesia practitioner.  Previous drug history, other anesthetic experiences, potential anesthetic problems and choice of anesthesia assessed, discussed with patient.     No prior complications.  Results of relevant diagnostic studies reviewed.    Plan of Sedation:  IV conscious sedationPatient assessed immediately prior to sedationPatient deemed appropriate candidate for conscious sedation options and plans discussed with patient / family    ASA 3 - Patient with severe systemic disease

## 2020-10-16 NOTE — OR NURSING
0847 Pt over from cath lab post gen change. Left chest site CDI and soft, no bleeding. Pt awakens to voice, denies pain and nausea. VSS.   0855 Family to bedside  0915 Tolerating orals  1000 Pt resting comfortably, no needs at this time  1020 Discharge instructions provided to pt and spouse. Discussed diet, activity, follow up, prescriptions and symptom management. Pt and spouse state understanding. Pt and spouse state all questions have been answered. Copy of discharge provided to pt.  1030 Pt ambulated to restroom, tolerated well  1035 Criteria met, L chest site CDI and soft, no signs of bleeding  1050 Wheeled off unit with all belongings, by RN, without incident

## 2020-10-22 ENCOUNTER — OFFICE VISIT (OUTPATIENT)
Dept: CARDIOLOGY | Facility: MEDICAL CENTER | Age: 70
End: 2020-10-22
Payer: MEDICARE

## 2020-10-22 VITALS
DIASTOLIC BLOOD PRESSURE: 72 MMHG | OXYGEN SATURATION: 95 % | BODY MASS INDEX: 26.84 KG/M2 | HEIGHT: 66 IN | SYSTOLIC BLOOD PRESSURE: 114 MMHG | HEART RATE: 70 BPM | WEIGHT: 167 LBS

## 2020-10-22 DIAGNOSIS — I48.0 PAF (PAROXYSMAL ATRIAL FIBRILLATION) (HCC): ICD-10-CM

## 2020-10-22 PROCEDURE — 93000 ELECTROCARDIOGRAM COMPLETE: CPT | Performed by: INTERNAL MEDICINE

## 2020-10-22 PROCEDURE — 99024 POSTOP FOLLOW-UP VISIT: CPT | Performed by: NURSE PRACTITIONER

## 2020-10-22 ASSESSMENT — FIBROSIS 4 INDEX: FIB4 SCORE: 1.13

## 2020-10-22 NOTE — PROGRESS NOTES
Seen for wound check and interrogation after gen change by Dr. Scanlon 9/16/20.    Device is working normally. No mode switching episodes.  Normal sensing and capture of RA and RV leads; stable impedances. Battery longevity is >8 years.  No changes are made today.    Left chest PPM site is clean and dry; no evidence of hematoma or erythema.  Mild distal pocket edema observed.  Steri Strip in tact.  Incision appears approximated underneath.     FU in 5 month for next PM check and in July as requested by Bryn Willis.   Collaborating MD: Isai

## 2020-10-27 LAB — EKG IMPRESSION: NORMAL

## 2020-11-09 ENCOUNTER — HOSPITAL ENCOUNTER (OUTPATIENT)
Dept: LAB | Facility: MEDICAL CENTER | Age: 70
End: 2020-11-09
Attending: FAMILY MEDICINE
Payer: MEDICARE

## 2020-11-09 ENCOUNTER — OFFICE VISIT (OUTPATIENT)
Dept: MEDICAL GROUP | Facility: PHYSICIAN GROUP | Age: 70
End: 2020-11-09
Payer: MEDICARE

## 2020-11-09 VITALS
WEIGHT: 168 LBS | SYSTOLIC BLOOD PRESSURE: 128 MMHG | TEMPERATURE: 97.3 F | HEART RATE: 65 BPM | OXYGEN SATURATION: 93 % | HEIGHT: 66 IN | RESPIRATION RATE: 14 BRPM | BODY MASS INDEX: 27 KG/M2 | DIASTOLIC BLOOD PRESSURE: 86 MMHG

## 2020-11-09 DIAGNOSIS — Z23 NEED FOR VACCINATION: ICD-10-CM

## 2020-11-09 DIAGNOSIS — I49.5 SSS (SICK SINUS SYNDROME) (HCC): Primary | ICD-10-CM

## 2020-11-09 DIAGNOSIS — E78.2 MIXED HYPERLIPIDEMIA: ICD-10-CM

## 2020-11-09 DIAGNOSIS — G47.00 INSOMNIA, UNSPECIFIED TYPE: ICD-10-CM

## 2020-11-09 DIAGNOSIS — D75.89 MACROCYTOSIS WITHOUT ANEMIA: ICD-10-CM

## 2020-11-09 DIAGNOSIS — I48.0 PAF (PAROXYSMAL ATRIAL FIBRILLATION) (HCC): ICD-10-CM

## 2020-11-09 LAB
CHOLEST SERPL-MCNC: 186 MG/DL (ref 100–199)
FASTING STATUS PATIENT QL REPORTED: NORMAL
FOLATE SERPL-MCNC: 31.5 NG/ML
HDLC SERPL-MCNC: 68 MG/DL
LDLC SERPL CALC-MCNC: 63 MG/DL
TRIGL SERPL-MCNC: 276 MG/DL (ref 0–149)
VIT B12 SERPL-MCNC: 659 PG/ML (ref 211–911)

## 2020-11-09 PROCEDURE — 82746 ASSAY OF FOLIC ACID SERUM: CPT

## 2020-11-09 PROCEDURE — 36415 COLL VENOUS BLD VENIPUNCTURE: CPT

## 2020-11-09 PROCEDURE — 90662 IIV NO PRSV INCREASED AG IM: CPT | Performed by: FAMILY MEDICINE

## 2020-11-09 PROCEDURE — G0008 ADMIN INFLUENZA VIRUS VAC: HCPCS | Performed by: FAMILY MEDICINE

## 2020-11-09 PROCEDURE — 82607 VITAMIN B-12: CPT

## 2020-11-09 PROCEDURE — 99214 OFFICE O/P EST MOD 30 MIN: CPT | Mod: 25 | Performed by: FAMILY MEDICINE

## 2020-11-09 PROCEDURE — 80061 LIPID PANEL: CPT

## 2020-11-09 ASSESSMENT — FIBROSIS 4 INDEX: FIB4 SCORE: 1.13

## 2020-11-09 NOTE — PROGRESS NOTES
"Subjective:     CC: 6 month follow up    HPI:   Simona presents today with     SSS (sick sinus syndrome)  This is a chronic condition. She had a pacemaker placed in 2010. It was replaced in October, 2020. She had it checked and it is working appropriately.    PAF (paroxysmal atrial fibrillation)  This is a chronic condition. She has paraoxysmal a fib and is on flecainide and metoprolol. She follows with cardiology regularly. She denies any palpitations, cp, sob. She hasn't had any episodes of a fib for \"quite a few years\".     Insomnia  This is a chronic condition. She has had trouble staying asleep for years. She is on trazodone which works somewhat for her sleep. She has no interest in trying anything stronger. She uses it every night.      Past Medical History:   Diagnosis Date   • Agatston coronary artery calcium score less than 100    • Cardiac pacemaker in situ 01/25/2012 June 2010: [employer] Altrua 60 [id number] implanted by Dr. Marte, [facility].    • Cataract     OU   • High cholesterol    • Hyperlipidemia    • Menopause    • Other specified disorder of intestines     Divertuculosis   • Paroxysmal atrial fibrillation (HCC)     Treated with Flecainide   • SSS (sick sinus syndrome) (HCC)        Social History     Tobacco Use   • Smoking status: Never Smoker   • Smokeless tobacco: Never Used   Substance Use Topics   • Alcohol use: Yes     Alcohol/week: 0.5 oz     Types: 1 Glasses of wine per week     Comment: social, 1 per week   • Drug use: No       Current Outpatient Medications Ordered in Epic   Medication Sig Dispense Refill   • simvastatin (ZOCOR) 40 MG Tab Take 1 Tab by mouth every evening. 90 Tab 3   • metoprolol SR (TOPROL XL) 25 MG TABLET SR 24 HR TAKE 1 TABLET DAILY (Patient taking differently: Take 25 mg by mouth every bedtime. TAKE 1 TABLET DAILY) 90 Tab 3   • flecainide (TAMBOCOR) 100 MG Tab TAKE 1 TABLET TWICE A  Tab 3   • traZODone (DESYREL) 100 MG Tab TAKE 1 TABLET AT " "BEDTIME AS NEEDED FOR SLEEP 90 Tab 3   • Cholecalciferol (VITAMIN D3) 2000 UNIT Cap Take 1 Cap by mouth every day. (Patient taking differently: Take 1 Cap by mouth every morning.) 30 Cap 0   • aspirin EC (ECOTRIN) 81 MG TBEC Take 81 mg by mouth every morning.     • MULTIPLE VITAMINS PO Take 1 Tab by mouth every morning.       No current [facility]-ordered facility-administered medications on file.        Allergies:  Patient has no known allergies.    Health Maintenance: Completed    ROS:  Gen: no fevers/chills  Pulm: + sob - a little winded when walking  CV: no chest pain      Objective:       Exam:  /86 (BP Location: Right arm, Patient Position: Sitting, BP Cuff Size: Adult)   Pulse 65   Temp 36.3 °C (97.3 °F) (Temporal)   Resp 14   Ht 1.676 m (5' 6\")   Wt 76.2 kg (168 lb)   SpO2 93%   BMI 27.12 kg/m²  Body mass index is 27.12 kg/m².    Gen: Alert and oriented, No apparent distress.  Neck: Neck is supple without lymphadenopathy.  Lungs: Normal effort, CTA bilaterally, no wheezes, rhonchi, or rales  CV: Regular rate and rhythm. No murmurs, rubs, or gallops.  Ext: No clubbing, cyanosis, edema.    Assessment & Plan:     70 y.o. female with the following -     1. SSS (sick sinus syndrome) ([facility])  This is a chronic condition, controlled.  She had a pacemaker placed in 2010 reports in October, 2020 she had the pacemaker replaced.  She did have a pacer check showing it was working appropriately.  She will continue to follow with cardiology for this condition.    2. PAF (paroxysmal atrial fibrillation) ([facility])  This is a chronic condition, controlled.  She has a longstanding history of paroxysmal A. fib but reports ever since the flecainide dose was increased several years ago she has not had any episodes of palpitations or A. Fib.  -Continue flecainide 100 mg twice daily  -Continue metoprolol SR 25 mg daily  -Continue to follow with cardiology    3. Mixed hyperlipidemia  This is a chronic condition, control unknown.  " She was on simvastatin for many years but was taken off because her coronary calcium score was under 100.  However, her cholesterol panel rebounded and she had a total cholesterol over 300s her cardiologist restarted her on simvastatin.  We have not rechecked a cholesterol panel since restarting the medication so I have ordered that today.  -Continue simvastatin 40 mg daily  - Lipid Profile; Future    4. Insomnia, unspecified type  This is a chronic condition, stable.  She has a longstanding history of insomnia and uses trazodone as needed to help her sleep.  She will use it every night and reports that it is somewhat helpful.  She thinks it is largely providing a placebo effect.  She has no interest in changing the medication or dose at this time.  -Continue trazodone 100 mg nightly as needed    5. Need for vaccination  - INFLUENZA VACCINE, HIGH DOSE (65+ ONLY)      Return in about 6 months (around 5/9/2021) for Medicare Wellness.    Please note that this dictation was created using voice recognition software. I have made every reasonable attempt to correct obvious errors, but I expect that there are errors of grammar and possibly content that I did not discover before finalizing the note.

## 2020-11-09 NOTE — ASSESSMENT & PLAN NOTE
This is a chronic condition. She had a pacemaker placed in 2010. It was replaced in October, 2020. She had it checked and it is working appropriately.

## 2020-11-09 NOTE — ASSESSMENT & PLAN NOTE
"This is a chronic condition. She has paraoxysmal a fib and is on flecainide and metoprolol. She follows with cardiology regularly. She denies any palpitations, cp, sob. She hasn't had any episodes of a fib for \"quite a few years\".   "

## 2021-01-08 ENCOUNTER — HOSPITAL ENCOUNTER (OUTPATIENT)
Dept: RADIOLOGY | Facility: MEDICAL CENTER | Age: 71
End: 2021-01-08
Attending: FAMILY MEDICINE
Payer: MEDICARE

## 2021-01-08 DIAGNOSIS — Z12.31 VISIT FOR SCREENING MAMMOGRAM: ICD-10-CM

## 2021-01-08 PROCEDURE — 77063 BREAST TOMOSYNTHESIS BI: CPT

## 2021-02-16 DIAGNOSIS — G47.00 INSOMNIA, UNSPECIFIED TYPE: ICD-10-CM

## 2021-02-16 RX ORDER — TRAZODONE HYDROCHLORIDE 100 MG/1
TABLET ORAL
Qty: 90 TABLET | Refills: 3 | Status: SHIPPED | OUTPATIENT
Start: 2021-02-16 | End: 2022-02-07

## 2021-02-16 NOTE — TELEPHONE ENCOUNTER
Received request via: Pharmacy    Was the patient seen in the last year in this department? Yes    Does the patient have an active prescription (recently filled or refills available) for medication(s) requested? No  
01-Jan-2018 18:49
30-Dec-2017
30-Dec-2017 16:14
31-Dec-2017 17:20

## 2021-03-03 DIAGNOSIS — Z23 NEED FOR VACCINATION: ICD-10-CM

## 2021-03-17 ENCOUNTER — NON-PROVIDER VISIT (OUTPATIENT)
Dept: CARDIOLOGY | Facility: MEDICAL CENTER | Age: 71
End: 2021-03-17
Payer: MEDICARE

## 2021-03-17 DIAGNOSIS — Z95.0 CARDIAC PACEMAKER IN SITU: ICD-10-CM

## 2021-03-17 DIAGNOSIS — I49.5 SSS (SICK SINUS SYNDROME) (HCC): ICD-10-CM

## 2021-03-17 PROCEDURE — 93280 PM DEVICE PROGR EVAL DUAL: CPT | Performed by: INTERNAL MEDICINE

## 2021-03-19 ENCOUNTER — OFFICE VISIT (OUTPATIENT)
Dept: MEDICAL GROUP | Facility: PHYSICIAN GROUP | Age: 71
End: 2021-03-19
Payer: MEDICARE

## 2021-03-19 VITALS
TEMPERATURE: 98.4 F | DIASTOLIC BLOOD PRESSURE: 72 MMHG | BODY MASS INDEX: 26.58 KG/M2 | WEIGHT: 165.4 LBS | RESPIRATION RATE: 20 BRPM | OXYGEN SATURATION: 94 % | HEIGHT: 66 IN | SYSTOLIC BLOOD PRESSURE: 102 MMHG | HEART RATE: 60 BPM

## 2021-03-19 DIAGNOSIS — M72.2 PLANTAR FASCIITIS: ICD-10-CM

## 2021-03-19 PROBLEM — M79.673 FOOT PAIN: Status: ACTIVE | Noted: 2021-03-19

## 2021-03-19 PROCEDURE — 99213 OFFICE O/P EST LOW 20 MIN: CPT | Performed by: FAMILY MEDICINE

## 2021-03-19 ASSESSMENT — PATIENT HEALTH QUESTIONNAIRE - PHQ9: CLINICAL INTERPRETATION OF PHQ2 SCORE: 0

## 2021-03-19 ASSESSMENT — FIBROSIS 4 INDEX: FIB4 SCORE: 1.13

## 2021-03-19 NOTE — ASSESSMENT & PLAN NOTE
Onset: 1 year, worse in last 6 months  Location: right heel  Duration: intermittent  Quality: sharp  Modifying factors: worse - first movement after rest  Precipitating trauma: none  Home treatments: new shoes, inserts - no relief,     She started walking more regularly about a year ago.

## 2021-03-19 NOTE — PROGRESS NOTES
"Subjective:     CC: foot pain    HPI:   Simona presents today with     Plantar fasciitis  Onset: 1 year, worse in last 6 months  Location: right heel  Duration: intermittent  Quality: sharp  Modifying factors: worse - first movement after rest  Precipitating trauma: none  Home treatments: new shoes, inserts - no relief,     She started walking more regularly about a year ago.      Current Outpatient Medications Ordered in Epic   Medication Sig Dispense Refill   • traZODone (DESYREL) 100 MG Tab TAKE 1 TABLET AT BEDTIME AS NEEDED FOR SLEEP 90 tablet 3   • simvastatin (ZOCOR) 40 MG Tab Take 1 Tab by mouth every evening. 90 Tab 3   • metoprolol SR (TOPROL XL) 25 MG TABLET SR 24 HR TAKE 1 TABLET DAILY (Patient taking differently: Take 25 mg by mouth every bedtime. TAKE 1 TABLET DAILY) 90 Tab 3   • flecainide (TAMBOCOR) 100 MG Tab TAKE 1 TABLET TWICE A  Tab 3   • Cholecalciferol (VITAMIN D3) 2000 UNIT Cap Take 1 Cap by mouth every day. (Patient taking differently: Take 1 Cap by mouth every morning.) 30 Cap 0   • aspirin EC (ECOTRIN) 81 MG TBEC Take 81 mg by mouth every morning.     • MULTIPLE VITAMINS PO Take 1 Tab by mouth every morning.       No current [facility]-ordered facility-administered medications on file.       Health Maintenance: Completed    ROS:  Gen: no fevers/chills  Pulm: no sob  CV: no chest pain    Objective:     Exam:  /72 (BP Location: Left arm, Patient Position: Sitting, BP Cuff Size: Adult)   Pulse 60   Temp 36.9 °C (98.4 °F) (Temporal)   Resp 20   Ht 1.676 m (5' 6\")   Wt 75 kg (165 lb 6.4 oz)   SpO2 94%   BMI 26.70 kg/m²  Body mass index is 26.7 kg/m².    Gen: Alert and oriented, No apparent distress.  Neck: Neck is supple without lymphadenopathy.  Lungs: Normal effort, CTA bilaterally, no wheezes, rhonchi, or rales  CV: Regular rate and rhythm. No murmurs, rubs, or gallops.  Ext: No clubbing, cyanosis, edema. No tenderness to palpation over the foot. Full ROM in ankle without " pain.    Assessment & Plan:     70 y.o. female with the following -     1. Plantar fasciitis  This is a chronic condition.  For last year she had intermittent right-sided heel pain that sounds like a plantar fasciitis based on history.  It has gotten worse in the last 6 months.  I did provide some rehabilitation exercises for her to complete.  If after few weeks these do not improve her pain then we can send her to podiatry.  We did discuss that she can use ibuprofen or other NSAIDs as needed for pain as well.    Return if symptoms worsen or fail to improve.    Please note that this dictation was created using voice recognition software. I have made every reasonable attempt to correct obvious errors, but I expect that there are errors of grammar and possibly content that I did not discover before finalizing the note.

## 2021-04-23 ENCOUNTER — PATIENT MESSAGE (OUTPATIENT)
Dept: MEDICAL GROUP | Facility: PHYSICIAN GROUP | Age: 71
End: 2021-04-23

## 2021-04-28 ENCOUNTER — TELEPHONE (OUTPATIENT)
Dept: CARDIOLOGY | Facility: MEDICAL CENTER | Age: 71
End: 2021-04-28

## 2021-04-28 NOTE — TELEPHONE ENCOUNTER
FYI-- patients device transmitted via home monitor.  Patient had atrial flutter episode 3/27 starting @ 4:55 am lasting approximately 20 minutes.  No other episodes noted.

## 2021-04-29 NOTE — TELEPHONE ENCOUNTER
Spoke to ALLISON CLEMONS and he stated to call the patient and determine if she is having any symptoms.  But there will be no changes to medications at this time.     Will call next business day.

## 2021-04-30 NOTE — TELEPHONE ENCOUNTER
Called and left VM to callback, patient identifier on VM.  Called patient mobile number and no answer, LM to callback.

## 2021-04-30 NOTE — TELEPHONE ENCOUNTER
Received call from Elizabeth rowell with patient returning call.    Spoke to the patient and she stated she has not been having any symptoms and didn't realize it was happening. Advised to call us back if she starts having symptoms or if anything changes. Confirmed upcoming appt in July and no changes to plan of care or medications at this time.   Verbalized understanding.      Routed to ALLIOSN CLEMONS as FYI.

## 2021-05-10 ENCOUNTER — OFFICE VISIT (OUTPATIENT)
Dept: MEDICAL GROUP | Facility: PHYSICIAN GROUP | Age: 71
End: 2021-05-10
Payer: MEDICARE

## 2021-05-10 VITALS
HEIGHT: 66 IN | TEMPERATURE: 97.8 F | DIASTOLIC BLOOD PRESSURE: 58 MMHG | OXYGEN SATURATION: 96 % | BODY MASS INDEX: 26.65 KG/M2 | HEART RATE: 66 BPM | WEIGHT: 165.8 LBS | SYSTOLIC BLOOD PRESSURE: 108 MMHG | RESPIRATION RATE: 16 BRPM

## 2021-05-10 DIAGNOSIS — R93.1 AGATSTON CORONARY ARTERY CALCIUM SCORE LESS THAN 100: ICD-10-CM

## 2021-05-10 DIAGNOSIS — G47.00 INSOMNIA, UNSPECIFIED TYPE: ICD-10-CM

## 2021-05-10 DIAGNOSIS — M72.2 PLANTAR FASCIITIS: ICD-10-CM

## 2021-05-10 DIAGNOSIS — Z95.0 CARDIAC PACEMAKER IN SITU: ICD-10-CM

## 2021-05-10 DIAGNOSIS — I48.0 PAF (PAROXYSMAL ATRIAL FIBRILLATION) (HCC): ICD-10-CM

## 2021-05-10 DIAGNOSIS — E78.2 MIXED HYPERLIPIDEMIA: ICD-10-CM

## 2021-05-10 DIAGNOSIS — I49.5 SSS (SICK SINUS SYNDROME) (HCC): ICD-10-CM

## 2021-05-10 DIAGNOSIS — Z00.00 ENCOUNTER FOR MEDICARE ANNUAL WELLNESS EXAM: ICD-10-CM

## 2021-05-10 PROCEDURE — G0439 PPPS, SUBSEQ VISIT: HCPCS | Performed by: FAMILY MEDICINE

## 2021-05-10 SDOH — ECONOMIC STABILITY: TRANSPORTATION INSECURITY
IN THE PAST 12 MONTHS, HAS THE LACK OF TRANSPORTATION KEPT YOU FROM MEDICAL APPOINTMENTS OR FROM GETTING MEDICATIONS?: NO

## 2021-05-10 SDOH — ECONOMIC STABILITY: INCOME INSECURITY: IN THE LAST 12 MONTHS, WAS THERE A TIME WHEN YOU WERE NOT ABLE TO PAY THE MORTGAGE OR RENT ON TIME?: NO

## 2021-05-10 SDOH — HEALTH STABILITY: MENTAL HEALTH
STRESS IS WHEN SOMEONE FEELS TENSE, NERVOUS, ANXIOUS, OR CAN'T SLEEP AT NIGHT BECAUSE THEIR MIND IS TROUBLED. HOW STRESSED ARE YOU?: TO SOME EXTENT

## 2021-05-10 SDOH — ECONOMIC STABILITY: TRANSPORTATION INSECURITY
IN THE PAST 12 MONTHS, HAS LACK OF RELIABLE TRANSPORTATION KEPT YOU FROM MEDICAL APPOINTMENTS, MEETINGS, WORK OR FROM GETTING THINGS NEEDED FOR DAILY LIVING?: NO

## 2021-05-10 SDOH — ECONOMIC STABILITY: HOUSING INSECURITY
IN THE LAST 12 MONTHS, WAS THERE A TIME WHEN YOU DID NOT HAVE A STEADY PLACE TO SLEEP OR SLEPT IN A SHELTER (INCLUDING NOW)?: NO

## 2021-05-10 SDOH — HEALTH STABILITY: PHYSICAL HEALTH: ON AVERAGE, HOW MANY DAYS PER WEEK DO YOU ENGAGE IN MODERATE TO STRENUOUS EXERCISE (LIKE A BRISK WALK)?: 2 DAYS

## 2021-05-10 SDOH — ECONOMIC STABILITY: HOUSING INSECURITY: IN THE LAST 12 MONTHS, HOW MANY PLACES HAVE YOU LIVED?: 1

## 2021-05-10 SDOH — HEALTH STABILITY: PHYSICAL HEALTH: ON AVERAGE, HOW MANY MINUTES DO YOU ENGAGE IN EXERCISE AT THIS LEVEL?: 60 MINUTES

## 2021-05-10 ASSESSMENT — SOCIAL DETERMINANTS OF HEALTH (SDOH)
HOW OFTEN DO YOU ATTEND CHURCH OR RELIGIOUS SERVICES?: MORE THAN 4 TIMES PER YEAR
HOW OFTEN DO YOU ATTENT MEETINGS OF THE CLUB OR ORGANIZATION YOU BELONG TO?: NEVER
WITHIN THE PAST 12 MONTHS, THE FOOD YOU BOUGHT JUST DIDN'T LAST AND YOU DIDN'T HAVE MONEY TO GET MORE: NEVER TRUE
HOW MANY DRINKS CONTAINING ALCOHOL DO YOU HAVE ON A TYPICAL DAY WHEN YOU ARE DRINKING: 1 OR 2
HOW OFTEN DO YOU GET TOGETHER WITH FRIENDS OR RELATIVES?: TWICE A WEEK
WITHIN THE PAST 12 MONTHS, YOU WORRIED THAT YOUR FOOD WOULD RUN OUT BEFORE YOU GOT THE MONEY TO BUY MORE: NEVER TRUE
DO YOU BELONG TO ANY CLUBS OR ORGANIZATIONS SUCH AS CHURCH GROUPS UNIONS, FRATERNAL OR ATHLETIC GROUPS, OR SCHOOL GROUPS?: NO
HOW OFTEN DO YOU HAVE A DRINK CONTAINING ALCOHOL: MONTHLY OR LESS
HOW HARD IS IT FOR YOU TO PAY FOR THE VERY BASICS LIKE FOOD, HOUSING, MEDICAL CARE, AND HEATING?: NOT HARD AT ALL
IN A TYPICAL WEEK, HOW MANY TIMES DO YOU TALK ON THE PHONE WITH FAMILY, FRIENDS, OR NEIGHBORS?: ONCE A WEEK
HOW OFTEN DO YOU HAVE SIX OR MORE DRINKS ON ONE OCCASION: NEVER

## 2021-05-10 ASSESSMENT — ENCOUNTER SYMPTOMS: GENERAL WELL-BEING: GOOD

## 2021-05-10 ASSESSMENT — FIBROSIS 4 INDEX: FIB4 SCORE: 1.13

## 2021-05-10 ASSESSMENT — PATIENT HEALTH QUESTIONNAIRE - PHQ9: CLINICAL INTERPRETATION OF PHQ2 SCORE: 0

## 2021-05-10 ASSESSMENT — ACTIVITIES OF DAILY LIVING (ADL): BATHING_REQUIRES_ASSISTANCE: 0

## 2021-05-10 NOTE — PROGRESS NOTES
Chief Complaint   Patient presents with   • Annual Wellness Visit         HPI:  Simona is a 70 y.o. here for Medicare Annual Wellness Visit      Patient Active Problem List    Diagnosis Date Noted   • Plantar fasciitis 03/19/2021   • Agatston coronary artery calcium score less than 100 07/16/2019   • Hearing problem of both ears 09/11/2017   • Vitamin D insufficiency 12/08/2015   • Hyperlipidemia 02/25/2014   • Insomnia 02/25/2014   • PAF (paroxysmal atrial fibrillation) ([facility]) 08/14/2013   • Cardiac pacemaker in situ 01/25/2012   • SSS (sick sinus syndrome) ([facility]) 01/25/2012       Current Outpatient Medications   Medication Sig Dispense Refill   • traZODone (DESYREL) 100 MG Tab TAKE 1 TABLET AT BEDTIME AS NEEDED FOR SLEEP 90 tablet 3   • simvastatin (ZOCOR) 40 MG Tab Take 1 Tab by mouth every evening. 90 Tab 3   • metoprolol SR (TOPROL XL) 25 MG TABLET SR 24 HR TAKE 1 TABLET DAILY (Patient taking differently: Take 25 mg by mouth every bedtime. TAKE 1 TABLET DAILY) 90 Tab 3   • flecainide (TAMBOCOR) 100 MG Tab TAKE 1 TABLET TWICE A  Tab 3   • Cholecalciferol (VITAMIN D3) 2000 UNIT Cap Take 1 Cap by mouth every day. (Patient taking differently: Take 1 Cap by mouth every morning.) 30 Cap 0   • aspirin EC (ECOTRIN) 81 MG TBEC Take 81 mg by mouth every morning.     • MULTIPLE VITAMINS PO Take 1 Tab by mouth every morning.       No current facility-administered medications for this visit.        Patient is taking medications as noted in medication list.  Current supplements as per medication list.     Allergies: Patient has no known allergies.    Current social contact/activities: Meet with friends, walking, gambling and eating out     Is patient current with immunizations? Yes.    She  reports that she has never smoked. She has never used smokeless tobacco. She reports current alcohol use of about 0.5 oz of alcohol per week. She reports that she does not use drugs.  Counseling given: Yes        DPA/Advanced  directive: Patient has Advanced Directive on file.     ROS:    Gait: Uses no assistive device   Ostomy: No   Other tubes: No   Amputations: No   Chronic oxygen use No   Last eye exam 2/2021  Wears hearing aids: No   : Reports urinary leakage during the last 6 months that has not interfered at all with their daily activities or sleep.      Screening:      Depression Screening    Little interest or pleasure in doing things?  0 - not at all  Feeling down, depressed, or hopeless? 0 - not at all  Patient Health Questionnaire Score: 0    If depressive symptoms identified deferred to follow up visit unless specifically addressed in assessment and plan.    Interpretation of PHQ-9 Total Score   Score Severity   1-4 No Depression   5-9 Mild Depression   10-14 Moderate Depression   15-19 Moderately Severe Depression   20-27 Severe Depression    Screening for Cognitive Impairment    Three Minute Recall (captain, garden, picture)  3/3    Stanislav clock face with all 12 numbers and set the hands to show 5 past 8.  Yes 4/5  If cognitive concerns identified, deferred for follow up unless specifically addressed in assessment and plan.    Fall Risk Assessment    Has the patient had two or more falls in the last year or any fall with injury in the last year?  No  If fall risk identified, deferred for follow up unless specifically addressed in assessment and plan.    Safety Assessment    Throw rugs on floor.  Yes  Handrails on all stairs.  No  Good lighting in all hallways.  Yes  Difficulty hearing.  No  Patient counseled about all safety risks that were identified.    Functional Assessment ADLs    Are there any barriers preventing you from cooking for yourself or meeting nutritional needs?  No.    Are there any barriers preventing you from driving safely or obtaining transportation?  No.    Are there any barriers preventing you from using a telephone or calling for help?  No.    Are there any barriers preventing you from shopping?  No.     Are there any barriers preventing you from taking care of your own finances?  No.    Are there any barriers preventing you from managing your medications?  No.    Are there any barriers preventing you from showering, bathing or dressing yourself?  No.    Are you currently engaging in any exercise or physical activity?  Yes.     What is your perception of your health?  Good.    Health Maintenance Summary                IMM ZOSTER VACCINES Postponed 8/19/2021 Originally 12/10/2012. Patient Refused     Done 10/15/2012 Imm Admin: Zoster Vaccine Live (ZVL) (Zostavax) - HISTORICAL DATA     Patient has more history with this topic...    IMM DTaP/Tdap/Td Vaccine Postponed 3/19/2022 Originally 1/10/2021. Insurance/Financial     Done 1/10/2011 Imm Admin: Dtap Vaccine    BONE DENSITY Next Due 11/9/2021      Done 11/9/2016 DS-BONE DENSITY STUDY (DEXA)    MAMMOGRAM Next Due 1/8/2022      Done 1/8/2021 MA-SCREENING MAMMO BILAT W/TOMOSYNTHESIS W/CAD     Patient has more history with this topic...    Annual Wellness Visit Next Due 5/11/2022      Done 5/10/2021 Visit Dx: Encounter for Medicare annual wellness exam     Patient has more history with this topic...    COLONOSCOPY Next Due 1/6/2030      Done 1/6/2020 REFERRAL TO GI FOR COLONOSCOPY     Patient has more history with this topic...          Patient Care Team:  Amanda Mccallum M.D. as PCP - General (Family Medicine)  Jonathan Malagon M.D. (Inactive) as Consulting Physician (Cardiology)  Alex Cruz M.D. as Consulting Physician (Surgery)  Devon Cook O.D. as Consulting Physician (Optometry)  Deon Clemente M.D. as Consulting Physician (Cardiology)    Social History     Tobacco Use   • Smoking status: Never Smoker   • Smokeless tobacco: Never Used   Substance Use Topics   • Alcohol use: Yes     Alcohol/week: 0.5 oz     Types: 1 Glasses of wine per week     Comment: social, 1 per week   • Drug use: No     Family History   Problem Relation Age of Onset   •  "Arrythmia Mother    • Diabetes Mother    • Cancer Mother         breast, lung   • Hypertension Mother    • Hyperlipidemia Mother    • Arrythmia Brother    • Diabetes Brother    • Hypertension Brother    • Cancer Brother         Prsotate CA   • Heart Disease Brother    • Heart Disease Father    • Cancer Maternal Aunt         breast   • Cancer Maternal Aunt         breast     She  has a past medical history of Agatston coronary artery calcium score less than 100, Cardiac pacemaker in situ (01/25/2012), Cataract, High cholesterol, Hyperlipidemia, Menopause, Other specified disorder of intestines, Paroxysmal atrial fibrillation (HCC), and SSS (sick sinus syndrome) (HCC).   Past Surgical History:   Procedure Laterality Date   • BREAST BIOPSY  4/28/2014    Performed by Alex Cruz M.D. at SURGERY SAME DAY [facility] ORS   • LOW ANTERIOR RESECTION ROBOTIC  5/28/2013    Performed by Prashant Almanzar M.D. at SURGERY [facility] ORS   • COLON RESECTION  2013 8 in colectomy   • PACEMAKER INSERTION  June 2010    [employer] Altrua 60 [id number] implanted by Dr. Marte, [facility].   • ABDOMINAL HYSTERECTOMY TOTAL  1981    for endometriosis, still has ovaries       Exam:     /58 (BP Location: Left arm, Patient Position: Sitting, BP Cuff Size: Adult)   Pulse 66   Temp 36.6 °C (97.8 °F) (Temporal)   Resp 16   Ht 1.676 m (5' 6\")   Wt 75.2 kg (165 lb 12.8 oz)   SpO2 96%  Body mass index is 26.76 kg/m².    Hearing good.    Dentition good  Alert, oriented in no acute distress.  Eye contact is good, speech goal directed, affect calm    Assessment and Plan. The following treatment and monitoring plan is recommended:     1. Encounter for Medicare annual wellness exam  Simona is a pleasant 70-year-old woman here today for her Medicare wellness visit.  She has no acute concerns.    2. PAF (paroxysmal atrial fibrillation) (HCC)  This is a chronic condition, controlled.  She has a history of paroxysmal atrial " fibrillation and is on flecainide and metoprolol for both rate and rhythm control.  She is on aspirin for anticoagulation.  She follows with cardiology regularly who is managing this condition.  She is asymptomatic today.  -Continue metoprolol, flecainide, and aspirin  -Continue to follow with cardiology    3. SSS (sick sinus syndrome) (HCC)  4. Cardiac pacemaker in situ  This is a chronic condition, stable.  She is have a history of sick sinus syndrome and has a pacemaker in place to manage that.  She recently had a pacemaker replaced in January, 2020 and she reports it continues to work well.  She follows with cardiology regularly who does pacer checks.  -Continue to follow with cardiology    5. Mixed hyperlipidemia  6. Agatston coronary artery calcium score less than 100  These are chronic conditions, controlled.  She has a history of hyperlipidemia with coronary calcifications indicating plaque.  She is on simvastatin for primary prevention and tolerating it well.  Her last cholesterol panel did show LDL under 70.  -Continue simvastatin    7. Insomnia, unspecified type  This is a chronic condition, stable.  She has had insomnia for decades.  She was on Ambien but transitioned to trazodone approximately 15 years ago.  She continues to wake up 2-3 times at night partially because she is a light sleeper.  She does use the trazodone which is somewhat helpful.  -Continue trazodone    8. Plantar fasciitis  This is a chronic condition, improved.  For about a year she has had right-sided plantar fasciitis.  She did see podiatry who provided some inserts for her walking shoes which is providing relief.  She does know to follow back up with podiatry if it worsens for possible corticosteroid injection.  -Continue to follow with podiatry    Services suggested: No services needed at this time  Health Care Screening recommendations as per orders if indicated.  Referrals offered: PT/OT/Nutrition counseling/Behavioral  Health/Smoking cessation as per orders if indicated.    Discussion today about general wellness and lifestyle habits:    · Prevent falls and reduce trip hazards; Cautioned about securing or removing rugs.  · Have a working fire alarm and carbon monoxide detector;   · Engage in regular physical activity and social activities       Follow-up: Return in about 1 year (around 5/10/2022) for Med check.

## 2021-07-13 ENCOUNTER — NON-PROVIDER VISIT (OUTPATIENT)
Dept: CARDIOLOGY | Facility: MEDICAL CENTER | Age: 71
End: 2021-07-13
Payer: MEDICARE

## 2021-07-13 DIAGNOSIS — I49.5 SSS (SICK SINUS SYNDROME) (HCC): ICD-10-CM

## 2021-07-13 DIAGNOSIS — Z95.0 CARDIAC PACEMAKER IN SITU: ICD-10-CM

## 2021-07-13 PROCEDURE — 93280 PM DEVICE PROGR EVAL DUAL: CPT | Performed by: INTERNAL MEDICINE

## 2021-09-07 ENCOUNTER — TELEPHONE (OUTPATIENT)
Dept: CARDIOLOGY | Facility: MEDICAL CENTER | Age: 71
End: 2021-09-07

## 2021-09-07 NOTE — TELEPHONE ENCOUNTER
Pt states in a [employer] message today that she had no serious symptoms during the event other than a fast heart beat.

## 2021-09-07 NOTE — TELEPHONE ENCOUNTER
Remote Transmission 9/5/2021:    1-AFL episode 9/3/2021@1:11pm lasting 3 hrs and 20 mins.    Pt not on OAC, ASA 81 mg only.    Full report scanned into media.

## 2021-09-13 DIAGNOSIS — I48.0 PAROXYSMAL ATRIAL FIBRILLATION (HCC): ICD-10-CM

## 2021-09-13 DIAGNOSIS — E78.2 MIXED HYPERLIPIDEMIA: ICD-10-CM

## 2021-09-13 RX ORDER — SIMVASTATIN 40 MG
TABLET ORAL
Qty: 90 TABLET | Refills: 0 | Status: SHIPPED | OUTPATIENT
Start: 2021-09-13 | End: 2021-10-19

## 2021-09-13 RX ORDER — METOPROLOL SUCCINATE 25 MG/1
TABLET, EXTENDED RELEASE ORAL
Qty: 90 TABLET | Refills: 0 | Status: SHIPPED | OUTPATIENT
Start: 2021-09-13 | End: 2021-10-19

## 2021-10-19 ENCOUNTER — OFFICE VISIT (OUTPATIENT)
Dept: CARDIOLOGY | Facility: MEDICAL CENTER | Age: 71
End: 2021-10-19
Payer: MEDICARE

## 2021-10-19 VITALS
WEIGHT: 166 LBS | OXYGEN SATURATION: 92 % | HEIGHT: 66 IN | DIASTOLIC BLOOD PRESSURE: 70 MMHG | HEART RATE: 60 BPM | RESPIRATION RATE: 16 BRPM | BODY MASS INDEX: 26.68 KG/M2 | SYSTOLIC BLOOD PRESSURE: 116 MMHG

## 2021-10-19 DIAGNOSIS — I48.0 PAF (PAROXYSMAL ATRIAL FIBRILLATION) (HCC): ICD-10-CM

## 2021-10-19 DIAGNOSIS — I48.0 PAROXYSMAL ATRIAL FIBRILLATION (HCC): ICD-10-CM

## 2021-10-19 DIAGNOSIS — E78.2 MIXED HYPERLIPIDEMIA: ICD-10-CM

## 2021-10-19 PROCEDURE — 99214 OFFICE O/P EST MOD 30 MIN: CPT | Performed by: INTERNAL MEDICINE

## 2021-10-19 PROCEDURE — 93000 ELECTROCARDIOGRAM COMPLETE: CPT | Performed by: INTERNAL MEDICINE

## 2021-10-19 RX ORDER — METOPROLOL SUCCINATE 25 MG/1
25 TABLET, EXTENDED RELEASE ORAL DAILY
Qty: 90 TABLET | Refills: 3 | Status: SHIPPED | OUTPATIENT
Start: 2021-10-19 | End: 2022-11-02 | Stop reason: SDUPTHER

## 2021-10-19 RX ORDER — FLECAINIDE ACETATE 100 MG/1
100 TABLET ORAL 2 TIMES DAILY
Qty: 180 TABLET | Refills: 3 | Status: SHIPPED | OUTPATIENT
Start: 2021-10-19 | End: 2022-10-26

## 2021-10-19 RX ORDER — SIMVASTATIN 40 MG
TABLET ORAL
Qty: 90 TABLET | Refills: 3 | Status: SHIPPED | OUTPATIENT
Start: 2021-10-19 | End: 2022-11-02 | Stop reason: SDUPTHER

## 2021-10-19 ASSESSMENT — ENCOUNTER SYMPTOMS
BACK PAIN: 1
INSOMNIA: 1
DIZZINESS: 1

## 2021-10-19 ASSESSMENT — FIBROSIS 4 INDEX: FIB4 SCORE: 1.147550621098493892

## 2021-10-19 NOTE — PROGRESS NOTES
Cardiology Follow-up Consultation Note    Date of note:   10/19/2021  Primary Care Provider: Pcp Pt States None  Referring Provider: Micky Patel M.D.     Patient Name: Simona Jensen   YOB: 1950  MRN:              [id number]    Chief Complaint: atrial fibrillation    History of Present Illness: Simona Jensen is a 71 y.o. female whose current medical problems include dyslipidemia, sick sinus syndrome status post pacemaker implantation ([employer], 6/2010), and paroxysmal atrial fibrillation rhythm controlled with flecainide who is here for follow-up.    At our visit, 7/16/2019:  In terms of pacemaker, v paced 28%, a paced 81%.     At our visit,  7/9/2020:  In terms of pacemaker, v paced 47%, a paced 99%.     In terms of atrial fibrillation, no palpitations.     Interim Events:  In terms of a fib, <1% on device, did have one episode of symptomatic palpitations.     In terms of pacemaker, v paced 0%, a paced 96%.     Occasional dizziness right after taking flecainide, only sometimes, and lasts for minutes.       Review of Systems   Musculoskeletal: Positive for back pain and joint pain.   Neurological: Positive for dizziness.   Psychiatric/Behavioral: The patient has insomnia.    Constitution: Negative for chills, fever and night sweats.   HENT: Negative for nosebleeds.    Eyes: Negative for vision loss in left eye and vision loss in right eye.   Respiratory: Negative for hemoptysis.    Gastrointestinal: Negative for hematemesis, hematochezia and melena.   Genitourinary: Negative for hematuria.   Neurological: Negative for focal weakness, numbness and paresthesias.     All other systems reviewed and discussed using a comprehensive questionnaire and are negative.         Past Medical History:   Diagnosis Date   • Agatston coronary artery calcium score less than 100    • Cardiac pacemaker in situ 01/25/2012 June 2010: [employer] Altrua 60 [id number] implanted by   Estuardo, [facility].    • Cataract     OU   • High cholesterol    • Hyperlipidemia    • Menopause    • Other specified disorder of intestines     Divertuculosis   • Paroxysmal atrial fibrillation (HCC)     Treated with Flecainide   • SSS (sick sinus syndrome) (HCC)          Past Surgical History:   Procedure Laterality Date   • BREAST BIOPSY  4/28/2014    Performed by Alex Cruz M.D. at SURGERY SAME DAY [facility] ORS   • LOW ANTERIOR RESECTION ROBOTIC  5/28/2013    Performed by Prashant Almanzar M.D. at SURGERY [facility] ORS   • COLON RESECTION  2013 8 in colectomy   • PACEMAKER INSERTION  June 2010    [employer] Altrua 60 [id number] implanted by Dr. Marte, [facility].   • ABDOMINAL HYSTERECTOMY TOTAL  1981    for endometriosis, still has ovaries         Current Outpatient Medications   Medication Sig Dispense Refill   • simvastatin (ZOCOR) 40 MG Tab TAKE 1 TABLET EVERY EVENING 90 Tablet 0   • metoprolol SR (TOPROL XL) 25 MG TABLET SR 24 HR TAKE 1 TABLET DAILY 90 Tablet 0   • flecainide (TAMBOCOR) 100 MG Tab TAKE 1 TABLET TWICE A  tablet 0   • traZODone (DESYREL) 100 MG Tab TAKE 1 TABLET AT BEDTIME AS NEEDED FOR SLEEP 90 tablet 3   • Cholecalciferol (VITAMIN D3) 2000 UNIT Cap Take 1 Cap by mouth every day. (Patient taking differently: Take 1 Cap by mouth every morning.) 30 Cap 0   • aspirin EC (ECOTRIN) 81 MG TBEC Take 81 mg by mouth every morning.     • MULTIPLE VITAMINS PO Take 1 Tab by mouth every morning.       No current facility-administered medications for this visit.         No Known Allergies      Family History   Problem Relation Age of Onset   • Arrythmia Mother    • Diabetes Mother    • Cancer Mother         breast, lung   • Hypertension Mother    • Hyperlipidemia Mother    • Arrythmia Brother    • Diabetes Brother    • Hypertension Brother    • Cancer Brother         Prsotate CA   • Heart Disease Brother    • Heart Disease Father    • Cancer Maternal Aunt          breast   • Cancer Maternal Aunt         breast         Social History     Socioeconomic History   • Marital status:      Spouse name: Not on file   • Number of children: Not on file   • Years of education: Not on file   • Highest education level: Some college, no degree   Occupational History   • Not on file   Tobacco Use   • Smoking status: Never Smoker   • Smokeless tobacco: Never Used   Substance and Sexual Activity   • Alcohol use: Yes     Alcohol/week: 0.5 oz     Types: 1 Glasses of wine per week     Comment: social, 1 per week   • Drug use: No   • Sexual activity: Not Currently     Partners: Male   Other Topics Concern   • Not on file   Social History Narrative    Retired .      Social Determinants of Health     Financial Resource Strain: Low Risk    • Difficulty of Paying Living Expenses: Not hard at all   Food Insecurity: No Food Insecurity   • Worried About Running Out of Food in the Last Year: Never true   • Ran Out of Food in the Last Year: Never true   Transportation Needs: No Transportation Needs   • Lack of Transportation (Medical): No   • Lack of Transportation (Non-Medical): No   Physical Activity: Insufficiently Active   • Days of Exercise per Week: 2 days   • Minutes of Exercise per Session: 60 min   Stress: Stress Concern Present   • Feeling of Stress : To some extent   Social Connections: Moderately Integrated   • Frequency of Communication with Friends and Family: Once a week   • Frequency of Social Gatherings with Friends and Family: Twice a week   • Attends Sabianist Services: More than 4 times per year   • Active Member of Clubs or Organizations: No   • Attends Club or Organization Meetings: Never   • Marital Status:    Intimate Partner Violence:    • Fear of Current or Ex-Partner:    • Emotionally Abused:    • Physically Abused:    • Sexually Abused:          Physical Exam:  Ambulatory Vitals  /70 (BP Location: Left arm, Patient Position: Sitting,  "BP Cuff Size: Adult)   Pulse 60   Resp 16   Ht 1.676 m (5' 6\")   Wt 75.3 kg (166 lb)   SpO2 92%    Oxygen Therapy:  Pulse Oximetry: 92 %  BP Readings from Last 4 Encounters:   10/19/21 116/70   05/10/21 108/58   03/19/21 102/72   11/09/20 128/86       Weight/BMI: Body mass index is 26.79 kg/m².  Wt Readings from Last 4 Encounters:   10/19/21 75.3 kg (166 lb)   05/10/21 75.2 kg (165 lb 12.8 oz)   03/19/21 75 kg (165 lb 6.4 oz)   11/09/20 76.2 kg (168 lb)       General: Well appearing and in no apparent distress  Eyes: nl conjunctiva  ENT: OP covered by mask  Neck: JVP <8 cm H2O, no carotid bruits  Lungs: normal respiratory effort, CTAB  Heart: RRR, no murmurs, no rubs or gallops, no edema bilateral lower extremities. No LV/RV heave on cardiac palpatation. 2+ bilateral radial pulses.  2+ bilateral dp pulses.   Abdomen: soft, non tender, non distended, no masses, normal bowel sounds.  No HSM.  Extremities/MSK: no clubbing, no cyanosis  Neurological: No focal sensory deficits  Psychiatric: Appropriate affect, A/O x 3, intact judgement and insight  Skin: Warm extremities    Exam repeated in full and unchanged except for as noted above.    Lab Data Review:  Lab Results   Component Value Date/Time    CHOLSTRLTOT 186 11/09/2020 01:44 PM    LDL 63 11/09/2020 01:44 PM    HDL 68 11/09/2020 01:44 PM    TRIGLYCERIDE 276 (H) 11/09/2020 01:44 PM       Lab Results   Component Value Date/Time    SODIUM 141 10/12/2020 11:17 AM    POTASSIUM 4.6 10/12/2020 11:17 AM    CHLORIDE 104 10/12/2020 11:17 AM    CO2 28 10/12/2020 11:17 AM    GLUCOSE 82 10/12/2020 11:17 AM    BUN 20 10/12/2020 11:17 AM    CREATININE 0.82 10/12/2020 11:17 AM     Lab Results   Component Value Date/Time    ALKPHOSPHAT 87 10/12/2020 11:17 AM    ASTSGOT 16 10/12/2020 11:17 AM    ALTSGPT 15 10/12/2020 11:17 AM    TBILIRUBIN 0.4 10/12/2020 11:17 AM      Lab Results   Component Value Date/Time    WBC 5.4 10/12/2020 11:17 AM     No components found for: HBGA1C  No " components found for: TROPONIN  No results found for: BNP      Cardiac Imaging and Procedures Review:    EKG dated 2019 : My personal interpretation is a paced, non-specific IVCD, QRS 110ms, low voltage, nl QT. QRS duration minimally changed from 2014 EKG.     EKg personally reviewed and showed a paced, RAD, nonspecific st changes.       Radiology test Review:  CXR: 3/2019  There is a left-sided pacer with leads projecting over the right atrium and right ventricle. The heart is not enlarged. No focal consolidation, pleural effusion or pneumothorax is identified.  Costophrenic angles are clear. Degenerative changes are seen   in the spine.    CCS 2019:  FINDINGS:    Coronary calcification:  LMA - 0.0  LCX - 0.0  LAD - 5.9  RCA - 0.0    Total Calcium Score: 5.9    Percentile: Calcium score is above the 25th percentile for the patient's age and sex.    Other findings:  Heart: Normal size.  Lungs: Clear.  Mediastinum: Normal.  Upper abdomen: Normal.      Medical Decision Makin. SSS (sick sinus syndrome) ([facility])  F/u with pacemaker clinic as needed    2. PAF (paroxysmal atrial fibrillation) ([facility])  Asymptomatic.  -continue flecainide 100mg PO bid  -continue metoprolol XL 25mg PO Daily  -[id number] score of 2, no NOAC indicated at this time, continue aspirin.    3. Mixed hyperlipidemia  Severe and possibly familial off statin. Low CCS.   -contiue simvastatin 40mg PO daily    4. Agatston coronary artery calcium score less than 100  No need to recheck as she will remain on statin.     Return in about 1 year (around 10/19/2022).      Deon Clemente MD, [facility]  [facility], [address]  [address] [address]  Phone: [phone]  Fax: [phone]

## 2021-10-20 LAB — EKG IMPRESSION: NORMAL

## 2021-12-28 ENCOUNTER — OFFICE VISIT (OUTPATIENT)
Dept: MEDICAL GROUP | Facility: PHYSICIAN GROUP | Age: 71
End: 2021-12-28
Payer: MEDICARE

## 2021-12-28 VITALS
WEIGHT: 169.6 LBS | SYSTOLIC BLOOD PRESSURE: 128 MMHG | HEART RATE: 60 BPM | BODY MASS INDEX: 27.26 KG/M2 | HEIGHT: 66 IN | OXYGEN SATURATION: 96 % | TEMPERATURE: 98.1 F | RESPIRATION RATE: 16 BRPM | DIASTOLIC BLOOD PRESSURE: 62 MMHG

## 2021-12-28 DIAGNOSIS — R05.9 COUGH: ICD-10-CM

## 2021-12-28 DIAGNOSIS — R09.81 NASAL CONGESTION: ICD-10-CM

## 2021-12-28 PROCEDURE — 99213 OFFICE O/P EST LOW 20 MIN: CPT | Performed by: STUDENT IN AN ORGANIZED HEALTH CARE EDUCATION/TRAINING PROGRAM

## 2021-12-28 RX ORDER — BENZONATATE 100 MG/1
100 CAPSULE ORAL 3 TIMES DAILY PRN
Qty: 60 CAPSULE | Refills: 0 | Status: SHIPPED | OUTPATIENT
Start: 2021-12-28 | End: 2022-05-10

## 2021-12-28 RX ORDER — BENZONATATE 100 MG/1
100 CAPSULE ORAL 3 TIMES DAILY PRN
Qty: 60 CAPSULE | Refills: 0 | Status: SHIPPED | OUTPATIENT
Start: 2021-12-28 | End: 2021-12-28 | Stop reason: SDUPTHER

## 2021-12-28 ASSESSMENT — FIBROSIS 4 INDEX: FIB4 SCORE: 1.147550621098493892

## 2021-12-28 NOTE — PROGRESS NOTES
"CC:  Diagnoses of Cough and Nasal congestion were pertinent to this visit.    HISTORY OF THE PRESENT ILLNESS: Patient is a 71 y.o. female. This pleasant patient is here today to discuss cough and sinus cngestion. Her PCP is Dr. Amanda Mccallum MD.    1. Cough and nasal congestion.  Onset December 24, 2021.  Symptoms have been nonproductive cough and increased nasal congestion with ear congestion.  Very little nasal discharge.  No headaches, no NVD, no subjective fever or myalgias.    Her  has been ill with similar symptoms.      No problem-specific Assessment & Plan notes found for this encounter.      Current Outpatient Medications Ordered in Epic   Medication Sig Dispense Refill   • simvastatin (ZOCOR) 40 MG Tab TAKE 1 TABLET EVERY EVENING 90 Tablet 3   • metoprolol SR (TOPROL XL) 25 MG TABLET SR 24 HR Take 1 Tablet by mouth every day. 90 Tablet 3   • flecainide (TAMBOCOR) 100 MG Tab Take 1 Tablet by mouth 2 times a day. 180 Tablet 3   • traZODone (DESYREL) 100 MG Tab TAKE 1 TABLET AT BEDTIME AS NEEDED FOR SLEEP 90 tablet 3   • Cholecalciferol (VITAMIN D3) 2000 UNIT Cap Take 1 Cap by mouth every day. (Patient taking differently: Take 1 Cap by mouth every morning.) 30 Cap 0   • aspirin EC (ECOTRIN) 81 MG TBEC Take 81 mg by mouth every morning.     • MULTIPLE VITAMINS PO Take 1 Tab by mouth every morning.     • benzonatate (TESSALON) 100 MG Cap Take 1 Capsule by mouth 3 times a day as needed for Cough. 60 Capsule 0     No current Epic-ordered facility-administered medications on file.     ROS:     See HPI.  Objective:     Exam: /62 (BP Location: Left arm, Patient Position: Sitting, BP Cuff Size: Adult)   Pulse 60   Temp 36.7 °C (98.1 °F) (Temporal)   Resp 16   Ht 1.676 m (5' 6\")   Wt 76.9 kg (169 lb 9.6 oz)   SpO2 96%  Body mass index is 27.37 kg/m².    General: Normal appearing. No distress.  HEENT: Sternal auditory canals are clear bilaterally, tympanic membranes are benign, nasal mucosa benign, " oropharynx is without erythema, edema or exudates.   Pulmonary: Clear to ausculation.  Normal effort. No rales, ronchi, or wheezing.  Cardiovascular: Regular rate and rhythm without murmur.   Lymph: No cervical/submandibular or supraclavicular lymph nodes are palpable  Skin: Warm and dry.  No obvious lesions.    A chaperone was offered to the patient during today's exam. Patient declined chaperone.    Labs:   No pertinent labs    Assessment & Plan:   71 y.o. female with the following -    1.  Cough with nasal congestion  -Acute uncomplicated illness.  -Highly likely viral URI.  -Allegra-D 12-hour formulation twice daily as needed congestion.  -Benzonatate 100 mg 3 times daily as needed for cough.  Dispense 60.  Refill 0.  -Ibuprofen and Tylenol according to package instructions as needed.    Return if symptoms worsen or fail to improve.    Please note that this dictation was created using voice recognition software. I have made every reasonable attempt to correct obvious errors, but I expect that there are errors of grammar and possibly content that I did not discover before finalizing the note.    Odin Augustine PA-C 12/28/2021

## 2021-12-28 NOTE — TELEPHONE ENCOUNTER
Received request via: Patient    Was the patient seen in the last year in this department? Yes    Does the patient have an active prescription (recently filled or refills available) for medication(s) requested? No       Patient was seen today and refill was placed however sent to the wrong pharmacy, Patient is requesting medication be sent to [facility] on [address].

## 2022-01-17 ENCOUNTER — HOSPITAL ENCOUNTER (OUTPATIENT)
Dept: RADIOLOGY | Facility: MEDICAL CENTER | Age: 72
End: 2022-01-17
Attending: FAMILY MEDICINE
Payer: MEDICARE

## 2022-01-17 DIAGNOSIS — Z12.31 VISIT FOR SCREENING MAMMOGRAM: ICD-10-CM

## 2022-01-17 PROCEDURE — 77063 BREAST TOMOSYNTHESIS BI: CPT

## 2022-01-24 ENCOUNTER — HOSPITAL ENCOUNTER (OUTPATIENT)
Dept: RADIOLOGY | Facility: MEDICAL CENTER | Age: 72
End: 2022-01-24
Attending: FAMILY MEDICINE
Payer: MEDICARE

## 2022-01-24 DIAGNOSIS — E55.9 VITAMIN D INSUFFICIENCY: ICD-10-CM

## 2022-01-24 DIAGNOSIS — Z78.0 POSTMENOPAUSAL: ICD-10-CM

## 2022-01-24 PROCEDURE — 77080 DXA BONE DENSITY AXIAL: CPT

## 2022-02-23 ENCOUNTER — HOSPITAL ENCOUNTER (OUTPATIENT)
Dept: LAB | Facility: MEDICAL CENTER | Age: 72
End: 2022-02-23
Attending: FAMILY MEDICINE
Payer: MEDICARE

## 2022-02-23 ENCOUNTER — HOSPITAL ENCOUNTER (OUTPATIENT)
Dept: LAB | Facility: MEDICAL CENTER | Age: 72
End: 2022-02-23
Attending: INTERNAL MEDICINE
Payer: MEDICARE

## 2022-02-23 DIAGNOSIS — I48.0 PAF (PAROXYSMAL ATRIAL FIBRILLATION) (HCC): ICD-10-CM

## 2022-02-23 DIAGNOSIS — E78.2 MIXED HYPERLIPIDEMIA: ICD-10-CM

## 2022-02-23 LAB
ALBUMIN SERPL BCP-MCNC: 4.6 G/DL (ref 3.2–4.9)
ALP SERPL-CCNC: 88 U/L (ref 30–99)
ALT SERPL-CCNC: 15 U/L (ref 2–50)
ANION GAP SERPL CALC-SCNC: 13 MMOL/L (ref 7–16)
AST SERPL-CCNC: 23 U/L (ref 12–45)
BILIRUB CONJ SERPL-MCNC: <0.2 MG/DL (ref 0.1–0.5)
BILIRUB INDIRECT SERPL-MCNC: NORMAL MG/DL (ref 0–1)
BILIRUB SERPL-MCNC: 0.4 MG/DL (ref 0.1–1.5)
BUN SERPL-MCNC: 21 MG/DL (ref 8–22)
CALCIUM SERPL-MCNC: 9.8 MG/DL (ref 8.5–10.5)
CHLORIDE SERPL-SCNC: 103 MMOL/L (ref 96–112)
CO2 SERPL-SCNC: 24 MMOL/L (ref 20–33)
CREAT SERPL-MCNC: 0.81 MG/DL (ref 0.5–1.4)
ERYTHROCYTE [DISTWIDTH] IN BLOOD BY AUTOMATED COUNT: 46.1 FL (ref 35.9–50)
GLUCOSE SERPL-MCNC: 83 MG/DL (ref 65–99)
HCT VFR BLD AUTO: 45.6 % (ref 37–47)
HGB BLD-MCNC: 15.3 G/DL (ref 12–16)
MCH RBC QN AUTO: 31.9 PG (ref 27–33)
MCHC RBC AUTO-ENTMCNC: 33.6 G/DL (ref 33.6–35)
MCV RBC AUTO: 95.2 FL (ref 81.4–97.8)
PLATELET # BLD AUTO: 236 K/UL (ref 164–446)
PMV BLD AUTO: 11.2 FL (ref 9–12.9)
POTASSIUM SERPL-SCNC: 4.5 MMOL/L (ref 3.6–5.5)
PROT SERPL-MCNC: 7.2 G/DL (ref 6–8.2)
RBC # BLD AUTO: 4.79 M/UL (ref 4.2–5.4)
SODIUM SERPL-SCNC: 140 MMOL/L (ref 135–145)
WBC # BLD AUTO: 5.9 K/UL (ref 4.8–10.8)

## 2022-02-23 PROCEDURE — 80076 HEPATIC FUNCTION PANEL: CPT

## 2022-02-23 PROCEDURE — 80048 BASIC METABOLIC PNL TOTAL CA: CPT

## 2022-02-23 PROCEDURE — 85027 COMPLETE CBC AUTOMATED: CPT

## 2022-02-23 PROCEDURE — 36415 COLL VENOUS BLD VENIPUNCTURE: CPT

## 2022-05-06 SDOH — HEALTH STABILITY: PHYSICAL HEALTH: ON AVERAGE, HOW MANY MINUTES DO YOU ENGAGE IN EXERCISE AT THIS LEVEL?: 60 MIN

## 2022-05-06 SDOH — ECONOMIC STABILITY: FOOD INSECURITY: WITHIN THE PAST 12 MONTHS, THE FOOD YOU BOUGHT JUST DIDN'T LAST AND YOU DIDN'T HAVE MONEY TO GET MORE.: NEVER TRUE

## 2022-05-06 SDOH — ECONOMIC STABILITY: FOOD INSECURITY: WITHIN THE PAST 12 MONTHS, YOU WORRIED THAT YOUR FOOD WOULD RUN OUT BEFORE YOU GOT MONEY TO BUY MORE.: NEVER TRUE

## 2022-05-06 SDOH — ECONOMIC STABILITY: INCOME INSECURITY: HOW HARD IS IT FOR YOU TO PAY FOR THE VERY BASICS LIKE FOOD, HOUSING, MEDICAL CARE, AND HEATING?: NOT HARD AT ALL

## 2022-05-06 SDOH — ECONOMIC STABILITY: INCOME INSECURITY: IN THE LAST 12 MONTHS, WAS THERE A TIME WHEN YOU WERE NOT ABLE TO PAY THE MORTGAGE OR RENT ON TIME?: NO

## 2022-05-06 SDOH — HEALTH STABILITY: MENTAL HEALTH
STRESS IS WHEN SOMEONE FEELS TENSE, NERVOUS, ANXIOUS, OR CAN'T SLEEP AT NIGHT BECAUSE THEIR MIND IS TROUBLED. HOW STRESSED ARE YOU?: ONLY A LITTLE

## 2022-05-06 SDOH — ECONOMIC STABILITY: HOUSING INSECURITY: IN THE LAST 12 MONTHS, HOW MANY PLACES HAVE YOU LIVED?: 1

## 2022-05-06 SDOH — HEALTH STABILITY: PHYSICAL HEALTH: ON AVERAGE, HOW MANY DAYS PER WEEK DO YOU ENGAGE IN MODERATE TO STRENUOUS EXERCISE (LIKE A BRISK WALK)?: 2 DAYS

## 2022-05-06 SDOH — ECONOMIC STABILITY: TRANSPORTATION INSECURITY
IN THE PAST 12 MONTHS, HAS LACK OF TRANSPORTATION KEPT YOU FROM MEETINGS, WORK, OR FROM GETTING THINGS NEEDED FOR DAILY LIVING?: NO

## 2022-05-06 ASSESSMENT — SOCIAL DETERMINANTS OF HEALTH (SDOH)
HOW OFTEN DO YOU GET TOGETHER WITH FRIENDS OR RELATIVES?: TWICE A WEEK
IN A TYPICAL WEEK, HOW MANY TIMES DO YOU TALK ON THE PHONE WITH FAMILY, FRIENDS, OR NEIGHBORS?: TWICE A WEEK
HOW OFTEN DO YOU HAVE A DRINK CONTAINING ALCOHOL: MONTHLY OR LESS
HOW MANY DRINKS CONTAINING ALCOHOL DO YOU HAVE ON A TYPICAL DAY WHEN YOU ARE DRINKING: 1 OR 2
HOW OFTEN DO YOU ATTEND CHURCH OR RELIGIOUS SERVICES?: MORE THAN 4 TIMES PER YEAR
DO YOU BELONG TO ANY CLUBS OR ORGANIZATIONS SUCH AS CHURCH GROUPS UNIONS, FRATERNAL OR ATHLETIC GROUPS, OR SCHOOL GROUPS?: NO
HOW OFTEN DO YOU ATTEND CHURCH OR RELIGIOUS SERVICES?: MORE THAN 4 TIMES PER YEAR
HOW OFTEN DO YOU GET TOGETHER WITH FRIENDS OR RELATIVES?: TWICE A WEEK
HOW HARD IS IT FOR YOU TO PAY FOR THE VERY BASICS LIKE FOOD, HOUSING, MEDICAL CARE, AND HEATING?: NOT HARD AT ALL
DO YOU BELONG TO ANY CLUBS OR ORGANIZATIONS SUCH AS CHURCH GROUPS UNIONS, FRATERNAL OR ATHLETIC GROUPS, OR SCHOOL GROUPS?: NO
WITHIN THE PAST 12 MONTHS, YOU WORRIED THAT YOUR FOOD WOULD RUN OUT BEFORE YOU GOT THE MONEY TO BUY MORE: NEVER TRUE
IN A TYPICAL WEEK, HOW MANY TIMES DO YOU TALK ON THE PHONE WITH FAMILY, FRIENDS, OR NEIGHBORS?: TWICE A WEEK
HOW OFTEN DO YOU ATTENT MEETINGS OF THE CLUB OR ORGANIZATION YOU BELONG TO?: NEVER
HOW OFTEN DO YOU HAVE SIX OR MORE DRINKS ON ONE OCCASION: NEVER
HOW OFTEN DO YOU ATTENT MEETINGS OF THE CLUB OR ORGANIZATION YOU BELONG TO?: NEVER

## 2022-05-06 ASSESSMENT — LIFESTYLE VARIABLES
HOW MANY STANDARD DRINKS CONTAINING ALCOHOL DO YOU HAVE ON A TYPICAL DAY: 1 OR 2
HOW OFTEN DO YOU HAVE A DRINK CONTAINING ALCOHOL: MONTHLY OR LESS
HOW OFTEN DO YOU HAVE SIX OR MORE DRINKS ON ONE OCCASION: NEVER

## 2022-05-10 ENCOUNTER — OFFICE VISIT (OUTPATIENT)
Dept: MEDICAL GROUP | Facility: PHYSICIAN GROUP | Age: 72
End: 2022-05-10
Payer: MEDICARE

## 2022-05-10 VITALS
WEIGHT: 169.2 LBS | OXYGEN SATURATION: 93 % | HEART RATE: 74 BPM | SYSTOLIC BLOOD PRESSURE: 124 MMHG | RESPIRATION RATE: 16 BRPM | HEIGHT: 66 IN | BODY MASS INDEX: 27.19 KG/M2 | DIASTOLIC BLOOD PRESSURE: 66 MMHG | TEMPERATURE: 98.5 F

## 2022-05-10 DIAGNOSIS — I49.5 SSS (SICK SINUS SYNDROME) (HCC): ICD-10-CM

## 2022-05-10 DIAGNOSIS — D68.69 SECONDARY HYPERCOAGULABLE STATE (HCC): ICD-10-CM

## 2022-05-10 DIAGNOSIS — I48.0 PAF (PAROXYSMAL ATRIAL FIBRILLATION) (HCC): ICD-10-CM

## 2022-05-10 DIAGNOSIS — E78.2 MIXED HYPERLIPIDEMIA: Primary | ICD-10-CM

## 2022-05-10 DIAGNOSIS — Z95.0 CARDIAC PACEMAKER IN SITU: ICD-10-CM

## 2022-05-10 DIAGNOSIS — Z13.79 GENETIC SCREENING: ICD-10-CM

## 2022-05-10 DIAGNOSIS — R93.1 AGATSTON CORONARY ARTERY CALCIUM SCORE LESS THAN 100: ICD-10-CM

## 2022-05-10 PROBLEM — R05.9 COUGH: Status: RESOLVED | Noted: 2021-12-28 | Resolved: 2022-05-10

## 2022-05-10 PROBLEM — R09.81 NASAL CONGESTION: Status: RESOLVED | Noted: 2021-12-28 | Resolved: 2022-05-10

## 2022-05-10 PROCEDURE — 99214 OFFICE O/P EST MOD 30 MIN: CPT | Performed by: FAMILY MEDICINE

## 2022-05-10 ASSESSMENT — ENCOUNTER SYMPTOMS
SHORTNESS OF BREATH: 0
CHILLS: 0
FEVER: 0

## 2022-05-10 ASSESSMENT — PATIENT HEALTH QUESTIONNAIRE - PHQ9: CLINICAL INTERPRETATION OF PHQ2 SCORE: 0

## 2022-05-10 ASSESSMENT — FIBROSIS 4 INDEX: FIB4 SCORE: 1.76

## 2022-05-10 NOTE — PROGRESS NOTES
"Subjective:     CC: a fib    HPI:   Simona presents today with    Problem   PAF (paroxysmal atrial fibrillation) (HCC)    Chronic. Paroxysmal  On flecainide and metoprolol  Stroke prophylaxis = low dose aspirin    Cardiology considering strong anticoagulation at age 76     Cardiac Pacemaker in Situ    June 2010: [employer] Altrua 60 [id number] implanted by Dr. Marte, [facility].   June, 2020: replaced     Cough (Resolved)   Nasal Congestion (Resolved)       Health Maintenance: Completed    ROS:  Review of Systems   Constitutional: Negative for chills and fever.   Respiratory: Negative for shortness of breath.    Cardiovascular: Negative for chest pain.       Objective:     Exam:  /66 (BP Location: Right arm, Patient Position: Sitting, BP Cuff Size: Adult)   Pulse 74   Temp 36.9 °C (98.5 °F) (Temporal)   Resp 16   Ht 1.676 m (5' 6\")   Wt 76.7 kg (169 lb 3.2 oz)   SpO2 93%   BMI 27.31 kg/m²  Body mass index is 27.31 kg/m².    Physical Exam  Constitutional:       Appearance: Normal appearance.   Cardiovascular:      Rate and Rhythm: Normal rate and regular rhythm.      Heart sounds: Normal heart sounds.   Pulmonary:      Effort: Pulmonary effort is normal.      Breath sounds: Normal breath sounds.   Musculoskeletal:      Cervical back: Normal range of motion and neck supple.   Neurological:      Mental Status: She is alert.       Assessment & Plan:     71 y.o. female with the following -     1. Mixed hyperlipidemia  2. Agatston coronary artery calcium score less than 100  Chronic, controlled. She is on a statin for primary prevention and tolerating it well. The  last cholesterol panel in 2020 was within normal limits except triglyceride, so we will continue the current dosing.  -Simvastatin 40 mg daily    3. PAF (paroxysmal atrial fibrillation) (HCC)  4. Secondary hypercoagulable state (HCC)  Chronic, stable. Well-controlled with rate and rhythm control medications. She is on aspirin for stroke " prophylaxis. She will continue these medications and follow up with cardiology as directed.  -Flecainide 100 mg twice daily  -Metoprolol SR 25 mg daily  -Aspirin 81 mg daily    5. SSS (sick sinus syndrome) (HCC)  6. Cardiac pacemaker in situ  Chronic, controlled. She will continue to follow with cardiology who is monitoring and managing this condition.    7. Genetic screening  Referral for genetic research was offered. Patient accepted.  - Referral to Genetic Research Studies    Return in about 6 months (around 11/10/2022) for Medicare Wellness.    Please note that this dictation was created using voice recognition software. I have made every reasonable attempt to correct obvious errors, but I expect that there are errors of grammar and possibly content that I did not discover before finalizing the note.

## 2022-07-18 ENCOUNTER — NON-PROVIDER VISIT (OUTPATIENT)
Dept: CARDIOLOGY | Facility: MEDICAL CENTER | Age: 72
End: 2022-07-18
Payer: MEDICARE

## 2022-07-18 PROCEDURE — 93294 REM INTERROG EVL PM/LDLS PM: CPT | Performed by: INTERNAL MEDICINE

## 2022-07-19 NOTE — CARDIAC REMOTE MONITOR - SCAN
Device transmission reviewed. Device demonstrated appropriate function.       Electronically Signed by: Jill Mancilla M.D.    7/27/2022  7:56 AM

## 2022-10-17 ENCOUNTER — NON-PROVIDER VISIT (OUTPATIENT)
Dept: CARDIOLOGY | Facility: MEDICAL CENTER | Age: 72
End: 2022-10-17
Payer: MEDICARE

## 2022-10-17 PROCEDURE — 93294 REM INTERROG EVL PM/LDLS PM: CPT | Performed by: INTERNAL MEDICINE

## 2022-10-17 NOTE — CARDIAC REMOTE MONITOR - SCAN
Device transmission reviewed. Device demonstrated appropriate function.       Electronically Signed by: Wayne Harper M.D.    10/17/2022  7:04 PM

## 2022-10-19 ENCOUNTER — APPOINTMENT (OUTPATIENT)
Dept: CARDIOLOGY | Facility: MEDICAL CENTER | Age: 72
End: 2022-10-19
Payer: MEDICARE

## 2022-10-24 DIAGNOSIS — I48.0 PAF (PAROXYSMAL ATRIAL FIBRILLATION) (HCC): ICD-10-CM

## 2022-10-25 NOTE — TELEPHONE ENCOUNTER
Is the patient due for a refill? Yes    Was the patient seen the past year? No    Date of last office visit: 10/19/21    Does the patient have an upcoming appointment?  Yes   If yes, When? 11/2/22    Provider to refill: ALLISON     Does the patients insurance require a 100 day supply?  No

## 2022-10-26 RX ORDER — FLECAINIDE ACETATE 100 MG/1
TABLET ORAL
Qty: 180 TABLET | Refills: 0 | Status: SHIPPED | OUTPATIENT
Start: 2022-10-26 | End: 2022-11-02 | Stop reason: SDUPTHER

## 2022-10-31 NOTE — PROGRESS NOTES
Chief Complaint   Patient presents with    Atrial Fibrillation       Follow up            Subjective:   Simona Jensen is a 72 y.o. female who presents today for annual follow-up with her  John.    Patient of Dr. Clemente.  Current medical problems include atrial fibrillation on flecainide, sick sinus syndrome s/p PPM, hypercholesterolemia, coronary calcium score of 6 in 2019.    Simona has been doing well over the last year since her visit.  No hospitalizations or surgeries.  She had her pacemaker checked this morning, normal function, she had 1 atrial fibrillation episode on 8/3.  She does not recall having symptoms.  She is not bothered by palpitations or racing heart rate.    She does do aerobic activity routinely.  She does not have any exertional symptoms.    She continues to take her simvastatin for her hypercholesterolemia.    She is planning to take a cruise to the [place] over the holidays.    Past Medical History:   Diagnosis Date    Agatston coronary artery calcium score less than 100     Cardiac pacemaker in situ 01/25/2012 June 2010: [employer] Altrua 60 [id number] implanted by Dr. Marte, [facility].     Cataract     OU    High cholesterol     Hyperlipidemia     Menopause     Other specified disorder of intestines     Divertuculosis    Paroxysmal atrial fibrillation (HCC)     Treated with Flecainide    SSS (sick sinus syndrome) (HCC)          Past Surgical History:   Procedure Laterality Date    BREAST BIOPSY  4/28/2014    Performed by Alex Cruz M.D. at SURGERY SAME DAY [facility] ORS    LOW ANTERIOR RESECTION ROBOTIC  5/28/2013    Performed by Prashant Almanzar M.D. at SURGERY [facility]    COLON RESECTION  2013 8 in colectomy    PACEMAKER INSERTION  June 2010    [employer] Altrua 60 [id number] implanted by Dr. Marte, [facility].    ABDOMINAL HYSTERECTOMY TOTAL  1981    for endometriosis, still has ovaries         Family History   Problem Relation  Age of Onset    Arrythmia Mother     Diabetes Mother     Cancer Mother         lung    Hypertension Mother     Hyperlipidemia Mother     Breast Cancer Mother     Arrythmia Brother     Diabetes Brother     Hypertension Brother     Cancer Brother         Prsotate CA    Heart Disease Brother     Colorectal Cancer Brother     Heart Disease Father     Breast Cancer Maternal Aunt     Breast Cancer Maternal Aunt     Ovarian Cancer Neg Hx     Tubal Cancer Neg Hx     Peritoneal Cancer Neg Hx          Social History     Tobacco Use   Smoking Status Never   Smokeless Tobacco Never          Social History     Substance and Sexual Activity   Alcohol Use Yes    Alcohol/week: 0.5 oz    Types: 1 Glasses of wine per week    Comment: social, 1 per week         No Known Allergies      Outpatient Encounter Medications as of 11/2/2022   Medication Sig Dispense Refill    flecainide (TAMBOCOR) 100 MG Tab Take 1 Tablet by mouth 2 times a day. 180 Tablet 3    metoprolol SR (TOPROL XL) 25 MG TABLET SR 24 HR Take 1 Tablet by mouth every day. 90 Tablet 3    simvastatin (ZOCOR) 40 MG Tab TAKE 1 TABLET EVERY EVENING 90 Tablet 3    traZODone (DESYREL) 100 MG Tab TAKE 1 TABLET AT BEDTIME AS NEEDED FOR SLEEP 90 Tablet 1    Cholecalciferol (VITAMIN D3) 2000 UNIT Cap Take 1 Cap by mouth every day. 30 Cap 0    aspirin EC (ECOTRIN) 81 MG TBEC Take 81 mg by mouth every morning.      [DISCONTINUED] flecainide (TAMBOCOR) 100 MG Tab TAKE 1 TABLET TWICE A  Tablet 0    [DISCONTINUED] simvastatin (ZOCOR) 40 MG Tab TAKE 1 TABLET EVERY EVENING 90 Tablet 3    [DISCONTINUED] metoprolol SR (TOPROL XL) 25 MG TABLET SR 24 HR Take 1 Tablet by mouth every day. 90 Tablet 3    [DISCONTINUED] MULTIPLE VITAMINS PO Take 1 Tab by mouth every morning.       No facility-administered encounter medications on file as of 11/2/2022.         Review of Systems   Constitutional:  Negative for chills and fever.        Weight gain   HENT:  Negative for nosebleeds.   "  Respiratory:  Negative for cough and shortness of breath.    Cardiovascular:  Negative for chest pain, palpitations, orthopnea, leg swelling and PND.   Gastrointestinal:  Negative for blood in stool, melena and nausea.   Neurological:  Negative for dizziness and loss of consciousness.        Objective:   /66 (BP Location: Left arm, Patient Position: Sitting)   Pulse 60   Resp 16   Ht 1.676 m (5' 6\")   Wt 76.2 kg (168 lb)   SpO2 94%   BMI 27.12 kg/m²        Physical Exam  Vitals and nursing note reviewed.   Constitutional:       General: She is not in acute distress.     Appearance: Normal appearance.   HENT:      Head: Normocephalic and atraumatic.   Cardiovascular:      Rate and Rhythm: Normal rate and regular rhythm.      Pulses: Normal pulses.      Heart sounds: No murmur heard.  Pulmonary:      Effort: Pulmonary effort is normal. No respiratory distress.      Breath sounds: Normal breath sounds.   Abdominal:      General: There is no distension.      Palpations: Abdomen is soft.   Musculoskeletal:      Right lower leg: No edema.      Left lower leg: No edema.   Skin:     General: Skin is warm and dry.   Neurological:      General: No focal deficit present.      Mental Status: She is alert and oriented to person, place, and time.      Gait: Gait normal.   Psychiatric:         Judgment: Judgment normal.          Assessment:     1. PAF (paroxysmal atrial fibrillation) ([facility])  flecainide (TAMBOCOR) 100 MG Tab      2. Paroxysmal atrial fibrillation (HCC)  metoprolol SR (TOPROL XL) 25 MG TABLET SR 24 HR    Basic Metabolic Panel    CBC WITHOUT DIFFERENTIAL    Lipid Profile      3. Mixed hyperlipidemia  simvastatin (ZOCOR) 40 MG Tab    Basic Metabolic Panel    CBC WITHOUT DIFFERENTIAL    Lipid Profile      4. High risk medication use  EKG           Medical Decision Making:  Today's Assessment / Plan:     SSS (sick sinus syndrome) (HCC)  F/u with pacemaker clinic annually  98% a paced     PAF (paroxysmal " atrial fibrillation) (HCC)  Asymptomatic.  -continue flecainide 100mg PO bid. I ordered BMP and EKG today for monitoring for this high risk medication due to his pro arrhythmia side effects  -continue metoprolol XL 25mg PO Daily  -[id number] score of 2, no NOAC indicated at this time, continue aspirin. Risks and benefits discussed today with Simona     Mixed hyperlipidemia  Hypertriglyceridemia  Severe and possibly familial off statin. Low CCS.   -contiue simvastatin 40mg PO daily  -Recheck lipid profile this year     Agatston coronary artery calcium score less than 100  No need to recheck as she will remain on statin      ADDENDUM: EKG today shows a paced with a QRS of 113ms, previously 104-110ms. Will check BMP, as long as renal function remains normal continue on current dose, consider repeat EKG in 6mo for surveillance

## 2022-11-02 ENCOUNTER — APPOINTMENT (OUTPATIENT)
Dept: CARDIOLOGY | Facility: MEDICAL CENTER | Age: 72
End: 2022-11-02
Payer: MEDICARE

## 2022-11-02 ENCOUNTER — NON-PROVIDER VISIT (OUTPATIENT)
Dept: CARDIOLOGY | Facility: MEDICAL CENTER | Age: 72
End: 2022-11-02
Payer: MEDICARE

## 2022-11-02 ENCOUNTER — OFFICE VISIT (OUTPATIENT)
Dept: CARDIOLOGY | Facility: MEDICAL CENTER | Age: 72
End: 2022-11-02
Payer: MEDICARE

## 2022-11-02 VITALS
HEART RATE: 60 BPM | SYSTOLIC BLOOD PRESSURE: 134 MMHG | WEIGHT: 168 LBS | HEIGHT: 66 IN | OXYGEN SATURATION: 94 % | DIASTOLIC BLOOD PRESSURE: 66 MMHG | RESPIRATION RATE: 16 BRPM | BODY MASS INDEX: 27 KG/M2

## 2022-11-02 DIAGNOSIS — E78.2 MIXED HYPERLIPIDEMIA: ICD-10-CM

## 2022-11-02 DIAGNOSIS — I49.5 SSS (SICK SINUS SYNDROME) (HCC): ICD-10-CM

## 2022-11-02 DIAGNOSIS — I48.0 PAF (PAROXYSMAL ATRIAL FIBRILLATION) (HCC): ICD-10-CM

## 2022-11-02 DIAGNOSIS — Z95.0 CARDIAC PACEMAKER IN SITU: ICD-10-CM

## 2022-11-02 DIAGNOSIS — I48.0 PAROXYSMAL ATRIAL FIBRILLATION (HCC): ICD-10-CM

## 2022-11-02 DIAGNOSIS — Z79.899 HIGH RISK MEDICATION USE: ICD-10-CM

## 2022-11-02 LAB — EKG IMPRESSION: NORMAL

## 2022-11-02 PROCEDURE — 93000 ELECTROCARDIOGRAM COMPLETE: CPT | Performed by: INTERNAL MEDICINE

## 2022-11-02 PROCEDURE — 93280 PM DEVICE PROGR EVAL DUAL: CPT | Performed by: INTERNAL MEDICINE

## 2022-11-02 PROCEDURE — 99214 OFFICE O/P EST MOD 30 MIN: CPT | Performed by: PHYSICIAN ASSISTANT

## 2022-11-02 RX ORDER — METOPROLOL SUCCINATE 25 MG/1
25 TABLET, EXTENDED RELEASE ORAL DAILY
Qty: 90 TABLET | Refills: 3 | Status: SHIPPED | OUTPATIENT
Start: 2022-11-02

## 2022-11-02 RX ORDER — FLECAINIDE ACETATE 100 MG/1
100 TABLET ORAL 2 TIMES DAILY
Qty: 180 TABLET | Refills: 3 | Status: SHIPPED | OUTPATIENT
Start: 2022-11-02

## 2022-11-02 RX ORDER — SIMVASTATIN 40 MG
TABLET ORAL
Qty: 90 TABLET | Refills: 3 | Status: SHIPPED | OUTPATIENT
Start: 2022-11-02

## 2022-11-02 ASSESSMENT — ENCOUNTER SYMPTOMS
ORTHOPNEA: 0
PND: 0
COUGH: 0
LOSS OF CONSCIOUSNESS: 0
DIZZINESS: 0
PALPITATIONS: 0
FEVER: 0
NAUSEA: 0
BLOOD IN STOOL: 0
CHILLS: 0
SHORTNESS OF BREATH: 0

## 2022-11-02 ASSESSMENT — FIBROSIS 4 INDEX: FIB4 SCORE: 1.79

## 2022-11-11 ENCOUNTER — PATIENT MESSAGE (OUTPATIENT)
Dept: HEALTH INFORMATION MANAGEMENT | Facility: OTHER | Age: 72
End: 2022-11-11

## 2023-01-16 ENCOUNTER — NON-PROVIDER VISIT (OUTPATIENT)
Dept: CARDIOLOGY | Facility: MEDICAL CENTER | Age: 73
End: 2023-01-16
Payer: MEDICARE

## 2023-01-16 PROCEDURE — 93294 REM INTERROG EVL PM/LDLS PM: CPT | Performed by: INTERNAL MEDICINE

## 2023-01-16 NOTE — CARDIAC REMOTE MONITOR - SCAN
Device transmission reviewed. Device demonstrated appropriate function.       Electronically Signed by: John Scanlon M.D.    1/16/2023  12:31 PM

## 2023-04-17 ENCOUNTER — NON-PROVIDER VISIT (OUTPATIENT)
Dept: CARDIOLOGY | Facility: MEDICAL CENTER | Age: 73
End: 2023-04-17
Payer: MEDICARE

## 2023-04-17 PROCEDURE — 93294 REM INTERROG EVL PM/LDLS PM: CPT | Performed by: INTERNAL MEDICINE

## 2023-04-17 NOTE — CARDIAC REMOTE MONITOR - SCAN
Device transmission reviewed. Device demonstrated appropriate function.       Electronically Signed by: Jill Mancilla M.D.    4/18/2023  8:08 AM

## 2024-05-08 ENCOUNTER — APPOINTMENT (RX ONLY)
Dept: URBAN - METROPOLITAN AREA CLINIC 6 | Facility: CLINIC | Age: 74
Setting detail: DERMATOLOGY
End: 2024-05-08

## 2024-05-08 DIAGNOSIS — D18.0 HEMANGIOMA: ICD-10-CM

## 2024-05-08 DIAGNOSIS — L82.0 INFLAMED SEBORRHEIC KERATOSIS: ICD-10-CM

## 2024-05-08 DIAGNOSIS — R21 RASH AND OTHER NONSPECIFIC SKIN ERUPTION: ICD-10-CM

## 2024-05-08 DIAGNOSIS — L81.4 OTHER MELANIN HYPERPIGMENTATION: ICD-10-CM

## 2024-05-08 DIAGNOSIS — D22 MELANOCYTIC NEVI: ICD-10-CM

## 2024-05-08 DIAGNOSIS — Z71.89 OTHER SPECIFIED COUNSELING: ICD-10-CM

## 2024-05-08 DIAGNOSIS — L82.1 OTHER SEBORRHEIC KERATOSIS: ICD-10-CM

## 2024-05-08 PROBLEM — D22.5 MELANOCYTIC NEVI OF TRUNK: Status: ACTIVE | Noted: 2024-05-08

## 2024-05-08 PROBLEM — D18.01 HEMANGIOMA OF SKIN AND SUBCUTANEOUS TISSUE: Status: ACTIVE | Noted: 2024-05-08

## 2024-05-08 PROCEDURE — ? COUNSELING

## 2024-05-08 PROCEDURE — 99203 OFFICE O/P NEW LOW 30 MIN: CPT | Mod: 25

## 2024-05-08 PROCEDURE — 17110 DESTRUCTION B9 LES UP TO 14: CPT | Mod: 59

## 2024-05-08 PROCEDURE — 11104 PUNCH BX SKIN SINGLE LESION: CPT

## 2024-05-08 PROCEDURE — ? SUNSCREEN RECOMMENDATIONS

## 2024-05-08 PROCEDURE — ? BIOPSY BY PUNCH METHOD

## 2024-05-08 PROCEDURE — ? LIQUID NITROGEN

## 2024-05-08 ASSESSMENT — LOCATION DETAILED DESCRIPTION DERM
LOCATION DETAILED: PERIUMBILICAL SKIN
LOCATION DETAILED: LEFT INFERIOR LATERAL NECK
LOCATION DETAILED: RIGHT MEDIAL SUPERIOR CHEST
LOCATION DETAILED: LEFT ULNAR DORSAL HAND
LOCATION DETAILED: RIGHT MEDIAL BREAST 4-5:00 REGION
LOCATION DETAILED: RIGHT RADIAL DORSAL HAND
LOCATION DETAILED: EPIGASTRIC SKIN
LOCATION DETAILED: LEFT INFERIOR MEDIAL MALAR CHEEK
LOCATION DETAILED: LEFT MEDIAL BREAST 9-10:00 REGION

## 2024-05-08 ASSESSMENT — LOCATION ZONE DERM
LOCATION ZONE: FACE
LOCATION ZONE: NECK
LOCATION ZONE: TRUNK
LOCATION ZONE: HAND

## 2024-05-08 ASSESSMENT — LOCATION SIMPLE DESCRIPTION DERM
LOCATION SIMPLE: LEFT ANTERIOR NECK
LOCATION SIMPLE: LEFT HAND
LOCATION SIMPLE: RIGHT BREAST
LOCATION SIMPLE: ABDOMEN
LOCATION SIMPLE: CHEST
LOCATION SIMPLE: LEFT CHEEK
LOCATION SIMPLE: LEFT BREAST
LOCATION SIMPLE: RIGHT HAND

## 2024-05-08 NOTE — PROCEDURE: LIQUID NITROGEN
Detail Level: Zone
Spray Paint Technique: No
Medical Necessity Clause: This procedure was medically necessary because the lesions that were treated were:
Show Spray Paint Technique Variable?: Yes
Spray Paint Text: The liquid nitrogen was applied to the skin utilizing a spray paint frosting technique.
Medical Necessity Information: It is in your best interest to select a reason for this procedure from the list below. All of these items fulfill various CMS LCD requirements except the new and changing color options.
Total Number Of Lesions Treated: 2
Post-Care Instructions: I reviewed with the patient in detail post-care instructions. Patient is to wear sunprotection, and avoid picking at any of the treated lesions. Pt may apply Vaseline to crusted or scabbing areas.
Duration Of Freeze Thaw-Cycle (Seconds): 0
Consent: The patient's consent was obtained including but not limited to risks of crusting, scabbing, blistering, scarring, darker or lighter pigmentary change, recurrence, incomplete removal and infection.

## 2024-05-08 NOTE — PROCEDURE: BIOPSY BY PUNCH METHOD
Detail Level: Detailed
Was A Bandage Applied: Yes
Punch Size In Mm: 4
Size Of Lesion In Cm (Optional): 0
Depth Of Punch Biopsy: dermis
Biopsy Type: H and E
Anesthesia Type: 1% lidocaine without epinephrine
Anesthesia Volume In Cc: 0.5
Hemostasis: None
Epidermal Sutures: 4-0 Ethilon
Wound Care: Petrolatum
Dressing: bandage
Suture Removal: 14 days
Patient Will Remove Sutures At Home?: No
Lab: [id number]
Lab Facility: 
Consent: Written consent was obtained and risks were reviewed including but not limited to scarring, infection, bleeding, scabbing, incomplete removal, nerve damage and allergy to anesthesia.
Post-Care Instructions: I reviewed with the patient in detail post-care instructions. Patient is to keep the biopsy site dry overnight, and then apply bacitracin twice daily until healed. Patient may apply hydrogen peroxide soaks to remove any crusting.
Home Suture Removal Text: Patient was provided a home suture removal kit and will remove their sutures at home.  If they have any questions or difficulties they will call the office.
Notification Instructions: Patient will be notified of biopsy results. However, patient instructed to call the office if not contacted within 2 weeks.
Billing Type: Third-Party Bill
Information: Selecting Yes will display possible errors in your note based on the variables you have selected. This validation is only offered as a suggestion for you. PLEASE NOTE THAT THE VALIDATION TEXT WILL BE REMOVED WHEN YOU FINALIZE YOUR NOTE. IF YOU WANT TO FAX A PRELIMINARY NOTE YOU WILL NEED TO TOGGLE THIS TO 'NO' IF YOU DO NOT WANT IT IN YOUR FAXED NOTE.

## 2024-05-16 ENCOUNTER — APPOINTMENT (RX ONLY)
Dept: URBAN - METROPOLITAN AREA CLINIC 6 | Facility: CLINIC | Age: 74
Setting detail: DERMATOLOGY
End: 2024-05-16

## 2024-05-16 DIAGNOSIS — L20.89 OTHER ATOPIC DERMATITIS: ICD-10-CM

## 2024-05-16 DIAGNOSIS — Z48.02 ENCOUNTER FOR REMOVAL OF SUTURES: ICD-10-CM

## 2024-05-16 PROCEDURE — ? DIAGNOSIS COMMENT

## 2024-05-16 PROCEDURE — 99213 OFFICE O/P EST LOW 20 MIN: CPT | Mod: 24

## 2024-05-16 PROCEDURE — ? MEDICATION COUNSELING

## 2024-05-16 PROCEDURE — ? SUTURE REMOVAL (GLOBAL PERIOD)

## 2024-05-16 PROCEDURE — ? PRESCRIPTION

## 2024-05-16 PROCEDURE — ? COUNSELING

## 2024-05-16 RX ORDER — TRIAMCINOLONE ACETONIDE 1 MG/G
OINTMENT TOPICAL BID
Qty: 30 | Refills: 1 | Status: ERX | COMMUNITY
Start: 2024-05-16

## 2024-05-16 RX ADMIN — TRIAMCINOLONE ACETONIDE: 1 OINTMENT TOPICAL at 00:00

## 2024-05-16 ASSESSMENT — LOCATION SIMPLE DESCRIPTION DERM: LOCATION SIMPLE: LEFT BREAST

## 2024-05-16 ASSESSMENT — LOCATION DETAILED DESCRIPTION DERM: LOCATION DETAILED: LEFT MEDIAL BREAST 7-8:00 REGION

## 2024-05-16 ASSESSMENT — LOCATION ZONE DERM: LOCATION ZONE: TRUNK

## 2024-05-16 NOTE — PROCEDURE: DIAGNOSIS COMMENT
Render Risk Assessment In Note?: no
Detail Level: Simple
Comment: Biopsy proven Accession #[id number]

## 2024-05-16 NOTE — PROCEDURE: MEDICATION COUNSELING
Calcipotriene Pregnancy And Lactation Text: The use of this medication during pregnancy or lactation is not recommended as there is insufficient data.
Cimzia Pregnancy And Lactation Text: This medication crosses the placenta but can be considered safe in certain situations. Cimzia may be excreted in breast milk.
Tremfya Pregnancy And Lactation Text: The risk during pregnancy and breastfeeding is uncertain with this medication.
Litfulo Counseling: I discussed with the patient the risks of Litfulo therapy including but not limited to upper respiratory tract infections, shingles, cold sores, and nausea. Live vaccines should be avoided.  This medication has been linked to serious infections; higher rate of mortality; malignancy and lymphoproliferative disorders; major adverse cardiovascular events; thrombosis; gastrointestinal perforations; neutropenia; lymphopenia; anemia; liver enzyme elevations; and lipid elevations.
Thalidomide Pregnancy And Lactation Text: This medication is Pregnancy Category X and is absolutely contraindicated during pregnancy. It is unknown if it is excreted in breast milk.
Bactrim Counseling:  I discussed with the patient the risks of sulfa antibiotics including but not limited to GI upset, allergic reaction, drug rash, diarrhea, dizziness, photosensitivity, and yeast infections.  Rarely, more serious reactions can occur including but not limited to aplastic anemia, agranulocytosis, methemoglobinemia, blood dyscrasias, liver or kidney failure, lung infiltrates or desquamative/blistering drug rashes.
Winlevi Counseling:  I discussed with the patient the risks of topical clascoterone including but not limited to erythema, scaling, itching, and stinging. Patient voiced their understanding.
Otezla Counseling: The side effects of Otezla were discussed with the patient, including but not limited to worsening or new depression, weight loss, diarrhea, nausea, upper respiratory tract infection, and headache. Patient instructed to call the office should any adverse effect occur.  The patient verbalized understanding of the proper use and possible adverse effects of Otezla.  All the patient's questions and concerns were addressed.
Terbinafine Counseling: Patient counseling regarding adverse effects of terbinafine including but not limited to headache, diarrhea, rash, upset stomach, liver function test abnormalities, itching, taste/smell disturbance, nausea, abdominal pain, and flatulence.  There is a rare possibility of liver failure that can occur when taking terbinafine.  The patient understands that a baseline LFT and kidney function test may be required. The patient verbalized understanding of the proper use and possible adverse effects of terbinafine.  All of the patient's questions and concerns were addressed.
Sarecycline Pregnancy And Lactation Text: This medication is Pregnancy Category D and not consider safe during pregnancy. It is also excreted in breast milk.
Solaraze Pregnancy And Lactation Text: This medication is Pregnancy Category B and is considered safe. There is some data to suggest avoiding during the third trimester. It is unknown if this medication is excreted in breast milk.
Niacinamide Pregnancy And Lactation Text: These medications are considered safe during pregnancy.
Hydroquinone Counseling:  Patient advised that medication may result in skin irritation, lightening (hypopigmentation), dryness, and burning.  In the event of skin irritation, the patient was advised to reduce the amount of the drug applied or use it less frequently.  Rarely, spots that are treated with hydroquinone can become darker (pseudoochronosis).  Should this occur, patient instructed to stop medication and call the office. The patient verbalized understanding of the proper use and possible adverse effects of hydroquinone.  All of the patient's questions and concerns were addressed.
Simponi Counseling:  I discussed with the patient the risks of golimumab including but not limited to myelosuppression, immunosuppression, autoimmune hepatitis, demyelinating diseases, lymphoma, and serious infections.  The patient understands that monitoring is required including a PPD at baseline and must alert us or the primary physician if symptoms of infection or other concerning signs are noted.
Picato Counseling:  I discussed with the patient the risks of Picato including but not limited to erythema, scaling, itching, weeping, crusting, and pain.
Opioid Pregnancy And Lactation Text: These medications can lead to premature delivery and should be avoided during pregnancy. These medications are also present in breast milk in small amounts.
Cantharidin Counseling:  I discussed with the patient the risks of Cantharidin including but not limited to pain, redness, burning, itching, and blistering.
Gabapentin Counseling: I discussed with the patient the risks of gabapentin including but not limited to dizziness, somnolence, fatigue and ataxia.
Dutasteride Male Counseling: Dustasteride Counseling:  I discussed with the patient the risks of use of dutasteride including but not limited to decreased libido, decreased ejaculate volume, and gynecomastia. Women who can become pregnant should not handle medication.  All of the patient's questions and concerns were addressed.
Ketoconazole Counseling:   Patient counseled regarding improving absorption with orange juice.  Adverse effects include but are not limited to breast enlargement, headache, diarrhea, nausea, upset stomach, liver function test abnormalities, taste disturbance, and stomach pain.  There is a rare possibility of liver failure that can occur when taking ketoconazole. The patient understands that monitoring of LFTs may be required, especially at baseline. The patient verbalized understanding of the proper use and possible adverse effects of ketoconazole.  All of the patient's questions and concerns were addressed.
Hyrimoz Pregnancy And Lactation Text: This medication is Pregnancy Category B and is considered safe during pregnancy. It is unknown if this medication is excreted in breast milk.
Topical Ketoconazole Pregnancy And Lactation Text: This medication is Pregnancy Category B and is considered safe during pregnancy. It is unknown if it is excreted in breast milk.
Bactrim Pregnancy And Lactation Text: This medication is Pregnancy Category D and is known to cause fetal risk.  It is also excreted in breast milk.
Aklief counseling:  Patient advised to apply a pea-sized amount only at bedtime and wait 30 minutes after washing their face before applying.  If too drying, patient may add a non-comedogenic moisturizer.  The most commonly reported side effects including irritation, redness, scaling, dryness, stinging, burning, itching, and increased risk of sunburn.  The patient verbalized understanding of the proper use and possible adverse effects of retinoids.  All of the patient's questions and concerns were addressed.
Litfulo Pregnancy And Lactation Text: Based on animal studies, Lifulo may cause embryo-fetal harm when administered to pregnant women.  The medication should not be used in pregnancy.  Breastfeeding is not recommended during treatment.
Tranexamic Acid Counseling:  Patient advised of the small risk of bleeding problems with tranexamic acid. They were also instructed to call if they developed any nausea, vomiting or diarrhea. All of the patient's questions and concerns were addressed.
Birth Control Pills Pregnancy And Lactation Text: This medication should be avoided if pregnant and for the first 30 days post-partum.
Metronidazole Counseling:  I discussed with the patient the risks of metronidazole including but not limited to seizures, nausea/vomiting, a metallic taste in the mouth, nausea/vomiting and severe allergy.
Cyclosporine Counseling:  I discussed with the patient the risks of cyclosporine including but not limited to hypertension, gingival hyperplasia,myelosuppression, immunosuppression, liver damage, kidney damage, neurotoxicity, lymphoma, and serious infections. The patient understands that monitoring is required including baseline blood pressure, CBC, CMP, lipid panel and uric acid, and then 1-2 times monthly CMP and blood pressure.
Nsaids Counseling: NSAID Counseling: I discussed with the patient that NSAIDs should be taken with food. Prolonged use of NSAIDs can result in the development of stomach ulcers.  Patient advised to stop taking NSAIDs if abdominal pain occurs.  The patient verbalized understanding of the proper use and possible adverse effects of NSAIDs.  All of the patient's questions and concerns were addressed.
Soolantra Counseling: I discussed with the patients the risks of topial Soolantra. This is a medicine which decreases the number of mites and inflammation in the skin. You experience burning, stinging, eye irritation or allergic reactions.  Please call our office if you develop any problems from using this medication.
Acitretin Counseling:  I discussed with the patient the risks of acitretin including but not limited to hair loss, dry lips/skin/eyes, liver damage, hyperlipidemia, depression/suicidal ideation, photosensitivity.  Serious rare side effects can include but are not limited to pancreatitis, pseudotumor cerebri, bony changes, clot formation/stroke/heart attack.  Patient understands that alcohol is contraindicated since it can result in liver toxicity and significantly prolong the elimination of the drug by many years.
Otezla Pregnancy And Lactation Text: This medication is Pregnancy Category C and it isn't known if it is safe during pregnancy. It is unknown if it is excreted in breast milk.
Albendazole Pregnancy And Lactation Text: This medication is Pregnancy Category C and it isn't known if it is safe during pregnancy. It is also excreted in breast milk.
Xolair Counseling:  Patient informed of potential adverse effects including but not limited to fever, muscle aches, rash and allergic reactions.  The patient verbalized understanding of the proper use and possible adverse effects of Xolair.  All of the patient's questions and concerns were addressed.
Cosentyx Counseling:  I discussed with the patient the risks of Cosentyx including but not limited to worsening of Crohn's disease, immunosuppression, allergic reactions and infections.  The patient understands that monitoring is required including a PPD at baseline and must alert us or the primary physician if symptoms of infection or other concerning signs are noted.
Terbinafine Pregnancy And Lactation Text: This medication is Pregnancy Category B and is considered safe during pregnancy. It is also excreted in breast milk and breast feeding isn't recommended.
Cantharidin Pregnancy And Lactation Text: This medication has not been proven safe during pregnancy. It is unknown if this medication is excreted in breast milk.
Winlevi Pregnancy And Lactation Text: This medication is considered safe during pregnancy and breastfeeding.
5-Fu Counseling: 5-Fluorouracil Counseling:  I discussed with the patient the risks of 5-fluorouracil including but not limited to erythema, scaling, itching, weeping, crusting, and pain.
Picato Pregnancy And Lactation Text: This medication is Pregnancy Category C. It is unknown if this medication is excreted in breast milk.
Tetracycline Counseling: Patient counseled regarding possible photosensitivity and increased risk for sunburn.  Patient instructed to avoid sunlight, if possible.  When exposed to sunlight, patients should wear protective clothing, sunglasses, and sunscreen.  The patient was instructed to call the office immediately if the following severe adverse effects occur:  hearing changes, easy bruising/bleeding, severe headache, or vision changes.  The patient verbalized understanding of the proper use and possible adverse effects of tetracycline.  All of the patient's questions and concerns were addressed. Patient understands to avoid pregnancy while on therapy due to potential birth defects.
Hydroquinone Pregnancy And Lactation Text: This medication has not been assigned a Pregnancy Risk Category but animal studies failed to show danger with the topical medication. It is unknown if the medication is excreted in breast milk.
Cyclosporine Pregnancy And Lactation Text: This medication is Pregnancy Category C and it isn't know if it is safe during pregnancy. This medication is excreted in breast milk.
Tranexamic Acid Pregnancy And Lactation Text: It is unknown if this medication is safe during pregnancy or breast feeding.
Gabapentin Pregnancy And Lactation Text: This medication is Pregnancy Category C and isn't considered safe during pregnancy. It is excreted in breast milk.
Dutasteride Female Counseling: Dutasteride Counseling:  I discussed with the patient the risks of use of dutasteride including but not limited to decreased libido and sexual dysfunction. Explained the teratogenic nature of the medication and stressed the importance of not getting pregnant during treatment. All of the patient's questions and concerns were addressed.
Metronidazole Pregnancy And Lactation Text: This medication is Pregnancy Category B and considered safe during pregnancy.  It is also excreted in breast milk.
Ilumya Counseling: I discussed with the patient the risks of tildrakizumab including but not limited to immunosuppression, malignancy, posterior leukoencephalopathy syndrome, and serious infections.  The patient understands that monitoring is required including a PPD at baseline and must alert us or the primary physician if symptoms of infection or other concerning signs are noted.
Spironolactone Counseling: Patient advised regarding risks of diarrhea, abdominal pain, hyperkalemia, birth defects (for female patients), liver toxicity and renal toxicity. The patient may need blood work to monitor liver and kidney function and potassium levels while on therapy. The patient verbalized understanding of the proper use and possible adverse effects of spironolactone.  All of the patient's questions and concerns were addressed.
Arava Counseling:  Patient counseled regarding adverse effects of Arava including but not limited to nausea, vomiting, abnormalities in liver function tests. Patients may develop mouth sores, rash, diarrhea, and abnormalities in blood counts. The patient understands that monitoring is required including LFTs and blood counts.  There is a rare possibility of scarring of the liver and lung problems that can occur when taking methotrexate. Persistent nausea, loss of appetite, pale stools, dark urine, cough, and shortness of breath should be reported immediately. Patient advised to discontinue Arava treatment and consult with a physician prior to attempting conception. The patient will have to undergo a treatment to eliminate Arava from the body prior to conception.
Ivermectin Counseling:  Patient instructed to take medication on an empty stomach with a full glass of water.  Patient informed of potential adverse effects including but not limited to nausea, diarrhea, dizziness, itching, and swelling of the extremities or lymph nodes.  The patient verbalized understanding of the proper use and possible adverse effects of ivermectin.  All of the patient's questions and concerns were addressed.
Olumiant Counseling: I discussed with the patient the risks of Olumiant therapy including but not limited to upper respiratory tract infections, shingles, cold sores, and nausea. Live vaccines should be avoided.  This medication has been linked to serious infections; higher rate of mortality; malignancy and lymphoproliferative disorders; major adverse cardiovascular events; thrombosis; gastrointestinal perforations; neutropenia; lymphopenia; anemia; liver enzyme elevations; and lipid elevations.
Acitretin Pregnancy And Lactation Text: This medication is Pregnancy Category X and should not be given to women who are pregnant or may become pregnant in the future. This medication is excreted in breast milk.
Topical Metronidazole Counseling: Metronidazole is a topical antibiotic medication. You may experience burning, stinging, redness, or allergic reactions.  Please call our office if you develop any problems from using this medication.
Cephalexin Counseling: I counseled the patient regarding use of cephalexin as an antibiotic for prophylactic and/or therapeutic purposes. Cephalexin (commonly prescribed under brand name Keflex) is a cephalosporin antibiotic which is active against numerous classes of bacteria, including most skin bacteria. Side effects may include nausea, diarrhea, gastrointestinal upset, rash, hives, yeast infections, and in rare cases, hepatitis, kidney disease, seizures, fever, confusion, neurologic symptoms, and others. Patients with severe allergies to penicillin medications are cautioned that there is about a 10% incidence of cross-reactivity with cephalosporins. When possible, patients with penicillin allergies should use alternatives to cephalosporins for antibiotic therapy.
Soolantra Pregnancy And Lactation Text: This medication is Pregnancy Category C. This medication is considered safe during breast feeding.
Xolair Pregnancy And Lactation Text: This medication is Pregnancy Category B and is considered safe during pregnancy. This medication is excreted in breast milk.
Aklief Pregnancy And Lactation Text: It is unknown if this medication is safe to use during pregnancy.  It is unknown if this medication is excreted in breast milk.  Breastfeeding women should use the topical cream on the smallest area of the skin for the shortest time needed while breastfeeding.  Do not apply to nipple and areola.
Imiquimod Counseling:  I discussed with the patient the risks of imiquimod including but not limited to erythema, scaling, itching, weeping, crusting, and pain.  Patient understands that the inflammatory response to imiquimod is variable from person to person and was educated regarded proper titration schedule.  If flu-like symptoms develop, patient knows to discontinue the medication and contact us.
VTAMA Counseling: I discussed with the patient that VTAMA is not for use in the eyes, mouth or mouth. They should call the office if they develop any signs of allergic reactions to VTAMA. The patient verbalized understanding of the proper use and possible adverse effects of VTAMA.  All of the patient's questions and concerns were addressed.
Skyrizi Counseling: I discussed with the patient the risks of risankizumab-rzaa including but not limited to immunosuppression, and serious infections.  The patient understands that monitoring is required including a PPD at baseline and must alert us or the primary physician if symptoms of infection or other concerning signs are noted.
Nsaids Pregnancy And Lactation Text: These medications are considered safe up to 30 weeks gestation. It is excreted in breast milk.
Oxybutynin Counseling:  I discussed with the patient the risks of oxybutynin including but not limited to skin rash, drowsiness, dry mouth, difficulty urinating, and blurred vision.
Spironolactone Pregnancy And Lactation Text: This medication can cause feminization of the male fetus and should be avoided during pregnancy. The active metabolite is also found in breast milk.
5-Fu Pregnancy And Lactation Text: This medication is Pregnancy Category X and contraindicated in pregnancy and in women who may become pregnant. It is unknown if this medication is excreted in breast milk.
Methotrexate Counseling:  Patient counseled regarding adverse effects of methotrexate including but not limited to nausea, vomiting, abnormalities in liver function tests. Patients may develop mouth sores, rash, diarrhea, and abnormalities in blood counts. The patient understands that monitoring is required including LFT's and blood counts.  There is a rare possibility of scarring of the liver and lung problems that can occur when taking methotrexate. Persistent nausea, loss of appetite, pale stools, dark urine, cough, and shortness of breath should be reported immediately. Patient advised to discontinue methotrexate treatment at least three months before attempting to become pregnant.  I discussed the need for folate supplements while taking methotrexate.  These supplements can decrease side effects during methotrexate treatment. The patient verbalized understanding of the proper use and possible adverse effects of methotrexate.  All of the patient's questions and concerns were addressed.
Protopic Counseling: Patient may experience a mild burning sensation during topical application. Protopic is not approved in children less than 2 years of age. There have been case reports of hematologic and skin malignancies in patients using topical calcineurin inhibitors although causality is questionable.
Minocycline Counseling: Patient advised regarding possible photosensitivity and discoloration of the teeth, skin, lips, tongue and gums.  Patient instructed to avoid sunlight, if possible.  When exposed to sunlight, patients should wear protective clothing, sunglasses, and sunscreen.  The patient was instructed to call the office immediately if the following severe adverse effects occur:  hearing changes, easy bruising/bleeding, severe headache, or vision changes.  The patient verbalized understanding of the proper use and possible adverse effects of minocycline.  All of the patient's questions and concerns were addressed.
Glycopyrrolate Counseling:  I discussed with the patient the risks of glycopyrrolate including but not limited to skin rash, drowsiness, dry mouth, difficulty urinating, and blurred vision.
Dutasteride Pregnancy And Lactation Text: This medication is absolutely contraindicated in women, especially during pregnancy and breast feeding. Feminization of male fetuses is possible if taking while pregnant.
Cimetidine Counseling:  I discussed with the patient the risks of Cimetidine including but not limited to gynecomastia, headache, diarrhea, nausea, drowsiness, arrhythmias, pancreatitis, skin rashes, psychosis, bone marrow suppression and kidney toxicity.
Detail Level: Simple
Valtrex Counseling: I discussed with the patient the risks of valacyclovir including but not limited to kidney damage, nausea, vomiting and severe allergy.  The patient understands that if the infection seems to be worsening or is not improving, they are to call.
Azelaic Acid Counseling: Patient counseled that medicine may cause skin irritation and to avoid applying near the eyes.  In the event of skin irritation, the patient was advised to reduce the amount of the drug applied or use it less frequently.   The patient verbalized understanding of the proper use and possible adverse effects of azelaic acid.  All of the patient's questions and concerns were addressed.
Olumiant Pregnancy And Lactation Text: Based on animal studies, Olumiant may cause embryo-fetal harm when administered to pregnant women.  The medication should not be used in pregnancy.  Breastfeeding is not recommended during treatment.
Dupixent Counseling: I discussed with the patient the risks of dupilumab including but not limited to eye infection and irritation, cold sores, injection site reactions, worsening of asthma, allergic reactions and increased risk of parasitic infection.  Live vaccines should be avoided while taking dupilumab. Dupilumab will also interact with certain medications such as warfarin and cyclosporine. The patient understands that monitoring is required and they must alert us or the primary physician if symptoms of infection or other concerning signs are noted.
Topical Retinoid counseling:  Patient advised to apply a pea-sized amount only at bedtime and wait 30 minutes after washing their face before applying.  If too drying, patient may add a non-comedogenic moisturizer. The patient verbalized understanding of the proper use and possible adverse effects of retinoids.  All of the patient's questions and concerns were addressed.
Topical Metronidazole Pregnancy And Lactation Text: This medication is Pregnancy Category B and considered safe during pregnancy.  It is also considered safe to use while breastfeeding.
Cephalexin Pregnancy And Lactation Text: This medication is Pregnancy Category B and considered safe during pregnancy.  It is also excreted in breast milk but can be used safely for shorter doses.
Bexarotene Counseling:  I discussed with the patient the risks of bexarotene including but not limited to hair loss, dry lips/skin/eyes, liver abnormalities, hyperlipidemia, pancreatitis, depression/suicidal ideation, photosensitivity, drug rash/allergic reactions, hypothyroidism, anemia, leukopenia, infection, cataracts, and teratogenicity.  Patient understands that they will need regular blood tests to check lipid profile, liver function tests, white blood cell count, thyroid function tests and pregnancy test if applicable.
Finasteride Male Counseling: Finasteride Counseling:  I discussed with the patient the risks of use of finasteride including but not limited to decreased libido, decreased ejaculate volume, gynecomastia, and depression. Women should not handle medication.  All of the patient's questions and concerns were addressed.
Protopic Pregnancy And Lactation Text: This medication is Pregnancy Category C. It is unknown if this medication is excreted in breast milk when applied topically.
Glycopyrrolate Pregnancy And Lactation Text: This medication is Pregnancy Category B and is considered safe during pregnancy. It is unknown if it is excreted breast milk.
Infliximab Counseling:  I discussed with the patient the risks of infliximab including but not limited to myelosuppression, immunosuppression, autoimmune hepatitis, demyelinating diseases, lymphoma, and serious infections.  The patient understands that monitoring is required including a PPD at baseline and must alert us or the primary physician if symptoms of infection or other concerning signs are noted.
Vtama Pregnancy And Lactation Text: It is unknown if this medication can cause problems during pregnancy and breastfeeding.
Olanzapine Counseling- I discussed with the patient the common side effects of olanzapine including but are not limited to: lack of energy, dry mouth, increased appetite, sleepiness, tremor, constipation, dizziness, changes in behavior, or restlessness.  Explained that teenagers are more likely to experience headaches, abdominal pain, pain in the arms or legs, tiredness, and sleepiness.  Serious side effects include but are not limited: increased risk of death in elderly patients who are confused, have memory loss, or dementia-related psychosis; hyperglycemia; increased cholesterol and triglycerides; and weight gain.
Erivedge Counseling- I discussed with the patient the risks of Erivedge including but not limited to nausea, vomiting, diarrhea, constipation, weight loss, changes in the sense of taste, decreased appetite, muscle spasms, and hair loss.  The patient verbalized understanding of the proper use and possible adverse effects of Erivedge.  All of the patient's questions and concerns were addressed.
Azelaic Acid Pregnancy And Lactation Text: This medication is considered safe during pregnancy and breast feeding.
Topical Steroids Counseling: I discussed with the patient that prolonged use of topical steroids can result in the increased appearance of superficial blood vessels (telangiectasias), lightening (hypopigmentation) and thinning of the skin (atrophy).  Patient understands to avoid using high potency steroids in skin folds, the groin or the face.  The patient verbalized understanding of the proper use and possible adverse effects of topical steroids.  All of the patient's questions and concerns were addressed.
Methotrexate Pregnancy And Lactation Text: This medication is Pregnancy Category X and is known to cause fetal harm. This medication is excreted in breast milk.
Drysol Counseling:  I discussed with the patient the risks of drysol/aluminum chloride including but not limited to skin rash, itching, irritation, burning.
Azathioprine Counseling:  I discussed with the patient the risks of azathioprine including but not limited to myelosuppression, immunosuppression, hepatotoxicity, lymphoma, and infections.  The patient understands that monitoring is required including baseline LFTs, Creatinine, possible TPMP genotyping and weekly CBCs for the first month and then every 2 weeks thereafter.  The patient verbalized understanding of the proper use and possible adverse effects of azathioprine.  All of the patient's questions and concerns were addressed.
Rinvoq Counseling: I discussed with the patient the risks of Rinvoq therapy including but not limited to upper respiratory tract infections, shingles, cold sores, bronchitis, nausea, cough, fever, acne, and headache. Live vaccines should be avoided.  This medication has been linked to serious infections; higher rate of mortality; malignancy and lymphoproliferative disorders; major adverse cardiovascular events; thrombosis; thrombocytopenia, anemia, and neutropenia; lipid elevations; liver enzyme elevations; and gastrointestinal perforations.
Clindamycin Counseling: I counseled the patient regarding use of clindamycin as an antibiotic for prophylactic and/or therapeutic purposes. Clindamycin is active against numerous classes of bacteria, including skin bacteria. Side effects may include nausea, diarrhea, gastrointestinal upset, rash, hives, yeast infections, and in rare cases, colitis.
Clofazimine Counseling:  I discussed with the patient the risks of clofazimine including but not limited to skin and eye pigmentation, liver damage, nausea/vomiting, gastrointestinal bleeding and allergy.
Dupixent Pregnancy And Lactation Text: This medication likely crosses the placenta but the risk for the fetus is uncertain. This medication is excreted in breast milk.
Fluconazole Counseling:  Patient counseled regarding adverse effects of fluconazole including but not limited to headache, diarrhea, nausea, upset stomach, liver function test abnormalities, taste disturbance, and stomach pain.  There is a rare possibility of liver failure that can occur when taking fluconazole.  The patient understands that monitoring of LFTs and kidney function test may be required, especially at baseline. The patient verbalized understanding of the proper use and possible adverse effects of fluconazole.  All of the patient's questions and concerns were addressed.
Valtrex Pregnancy And Lactation Text: this medication is Pregnancy Category B and is considered safe during pregnancy. This medication is not directly found in breast milk but it's metabolite acyclovir is present.
Zoryve Counseling:  I discussed with the patient that Zoryve is not for use in the eyes, mouth or vagina. The most commonly reported side effects include diarrhea, headache, insomnia, application site pain, upper respiratory tract infections, and urinary tract infections.  All of the patient's questions and concerns were addressed.
Propranolol Counseling:  I discussed with the patient the risks of propranolol including but not limited to low heart rate, low blood pressure, low blood sugar, restlessness and increased cold sensitivity. They should call the office if they experience any of these side effects.
Stelara Counseling:  I discussed with the patient the risks of ustekinumab including but not limited to immunosuppression, malignancy, posterior leukoencephalopathy syndrome, and serious infections.  The patient understands that monitoring is required including a PPD at baseline and must alert us or the primary physician if symptoms of infection or other concerning signs are noted.
Include Pregnancy/Lactation Warning?: No
Qbrexza Counseling:  I discussed with the patient the risks of Qbrexza including but not limited to headache, mydriasis, blurred vision, dry eyes, nasal dryness, dry mouth, dry throat, dry skin, urinary hesitation, and constipation.  Local skin reactions including erythema, burning, stinging, and itching can also occur.
Adbry Counseling: I discussed with the patient the risks of tralokinumab including but not limited to eye infection and irritation, cold sores, injection site reactions, worsening of asthma, allergic reactions and increased risk of parasitic infection.  Live vaccines should be avoided while taking tralokinumab. The patient understands that monitoring is required and they must alert us or the primary physician if symptoms of infection or other concerning signs are noted.
Klisyri Counseling:  I discussed with the patient the risks of Klisyri including but not limited to erythema, scaling, itching, weeping, crusting, and pain.
Bexarotene Pregnancy And Lactation Text: This medication is Pregnancy Category X and should not be given to women who are pregnant or may become pregnant. This medication should not be used if you are breast feeding.
Finasteride Female Counseling: Finasteride Counseling:  I discussed with the patient the risks of use of finasteride including but not limited to decreased libido and sexual dysfunction. Explained the teratogenic nature of the medication and stressed the importance of not getting pregnant during treatment. All of the patient's questions and concerns were addressed.
Topical Steroids Applications Pregnancy And Lactation Text: Most topical steroids are considered safe to use during pregnancy and lactation.  Any topical steroid applied to the breast or nipple should be washed off before breastfeeding.
Quinolones Counseling:  I discussed with the patient the risks of fluoroquinolones including but not limited to GI upset, allergic reaction, drug rash, diarrhea, dizziness, photosensitivity, yeast infections, liver function test abnormalities, tendonitis/tendon rupture.
Hydroxychloroquine Counseling:  I discussed with the patient that a baseline ophthalmologic exam is needed at the start of therapy and every year thereafter while on therapy. A CBC may also be warranted for monitoring.  The side effects of this medication were discussed with the patient, including but not limited to agranulocytosis, aplastic anemia, seizures, rashes, retinopathy, and liver toxicity. Patient instructed to call the office should any adverse effect occur.  The patient verbalized understanding of the proper use and possible adverse effects of Plaquenil.  All the patient's questions and concerns were addressed.
Clindamycin Pregnancy And Lactation Text: This medication can be used in pregnancy if certain situations. Clindamycin is also present in breast milk.
Enbrel Counseling:  I discussed with the patient the risks of etanercept including but not limited to myelosuppression, immunosuppression, autoimmune hepatitis, demyelinating diseases, lymphoma, and infections.  The patient understands that monitoring is required including a PPD at baseline and must alert us or the primary physician if symptoms of infection or other concerning signs are noted.
Rinvoq Pregnancy And Lactation Text: Based on animal studies, Rinvoq may cause embryo-fetal harm when administered to pregnant women.  The medication should not be used in pregnancy.  Breastfeeding is not recommended during treatment and for 6 days after the last dose.
Doxepin Counseling:  Patient advised that the medication is sedating and not to drive a car after taking this medication. Patient informed of potential adverse effects including but not limited to dry mouth, urinary retention, and blurry vision.  The patient verbalized understanding of the proper use and possible adverse effects of doxepin.  All of the patient's questions and concerns were addressed.
Fluconazole Pregnancy And Lactation Text: This medication is Pregnancy Category C and it isn't know if it is safe during pregnancy. It is also excreted in breast milk.
Benzoyl Peroxide Counseling: Patient counseled that medicine may cause skin irritation and bleach clothing.  In the event of skin irritation, the patient was advised to reduce the amount of the drug applied or use it less frequently.   The patient verbalized understanding of the proper use and possible adverse effects of benzoyl peroxide.  All of the patient's questions and concerns were addressed.
Prednisone Counseling:  I discussed with the patient the risks of prolonged use of prednisone including but not limited to weight gain, insomnia, osteoporosis, mood changes, diabetes, susceptibility to infection, glaucoma and high blood pressure.  In cases where prednisone use is prolonged, patients should be monitored with blood pressure checks, serum glucose levels and an eye exam.  Additionally, the patient may need to be placed on GI prophylaxis, PCP prophylaxis, and calcium and vitamin D supplementation and/or a bisphosphonate.  The patient verbalized understanding of the proper use and the possible adverse effects of prednisone.  All of the patient's questions and concerns were addressed.
Azathioprine Pregnancy And Lactation Text: This medication is Pregnancy Category D and isn't considered safe during pregnancy. It is unknown if this medication is excreted in breast milk.
Isotretinoin Counseling: Patient should get monthly blood tests, not donate blood, not drive at night if vision affected, not share medication, and not undergo elective surgery for 6 months after tx completed. Side effects reviewed, pt to contact office should one occur.
Propranolol Pregnancy And Lactation Text: This medication is Pregnancy Category C and it isn't known if it is safe during pregnancy. It is excreted in breast milk.
Adbry Pregnancy And Lactation Text: It is unknown if this medication will adversely affect pregnancy or breast feeding.
Klisyri Pregnancy And Lactation Text: It is unknown if this medication can harm a developing fetus or if it is excreted in breast milk.
Tazorac Counseling:  Patient advised that medication is irritating and drying.  Patient may need to apply sparingly and wash off after an hour before eventually leaving it on overnight.  The patient verbalized understanding of the proper use and possible adverse effects of tazorac.  All of the patient's questions and concerns were addressed.
Elidel Counseling: Patient may experience a mild burning sensation during topical application. Elidel is not approved in children less than 2 years of age. There have been case reports of hematologic and skin malignancies in patients using topical calcineurin inhibitors although causality is questionable.
Libtayo Counseling- I discussed with the patient the risks of Libtayo including but not limited to nausea, vomiting, diarrhea, and bone or muscle pain.  The patient verbalized understanding of the proper use and possible adverse effects of Libtayo.  All of the patient's questions and concerns were addressed.
Qbrexza Pregnancy And Lactation Text: There is no available data on Qbrexza use in pregnant women.  There is no available data on Qbrexza use in lactation.
Mirvaso Counseling: Mirvaso is a topical medication which can decrease superficial blood flow where applied. Side effects are uncommon and include stinging, redness and allergic reactions.
Doxepin Pregnancy And Lactation Text: This medication is Pregnancy Category C and it isn't known if it is safe during pregnancy. It is also excreted in breast milk and breast feeding isn't recommended.
Colchicine Counseling:  Patient counseled regarding adverse effects including but not limited to stomach upset (nausea, vomiting, stomach pain, or diarrhea).  Patient instructed to limit alcohol consumption while taking this medication.  Colchicine may reduce blood counts especially with prolonged use.  The patient understands that monitoring of kidney function and blood counts may be required, especially at baseline. The patient verbalized understanding of the proper use and possible adverse effects of colchicine.  All of the patient's questions and concerns were addressed.
Griseofulvin Counseling:  I discussed with the patient the risks of griseofulvin including but not limited to photosensitivity, cytopenia, liver damage, nausea/vomiting and severe allergy.  The patient understands that this medication is best absorbed when taken with a fatty meal (e.g., ice cream or french fries).
Sotyktu Counseling:  I discussed the most common side effects of Sotyktu including: common cold, sore throat, sinus infections, cold sores, canker sores, folliculitis, and acne.? I also discussed more serious side effects of Sotyktu including but not limited to: serious allergic reactions; increased risk for infections such as TB; cancers such as lymphomas; rhabdomyolysis and elevated CPK; and elevated triglycerides and liver enzymes.?
Rituxan Counseling:  I discussed with the patient the risks of Rituxan infusions. Side effects can include infusion reactions, severe drug rashes including mucocutaneous reactions, reactivation of latent hepatitis and other infections and rarely progressive multifocal leukoencephalopathy.  All of the patient's questions and concerns were addressed.
Finasteride Pregnancy And Lactation Text: This medication is absolutely contraindicated during pregnancy. It is unknown if it is excreted in breast milk.
Benzoyl Peroxide Pregnancy And Lactation Text: This medication is Pregnancy Category C. It is unknown if benzoyl peroxide is excreted in breast milk.
Hydroxychloroquine Pregnancy And Lactation Text: This medication has been shown to cause fetal harm but it isn't assigned a Pregnancy Risk Category. There are small amounts excreted in breast milk.
Topical Sulfur Applications Counseling: Topical Sulfur Counseling: Patient counseled that this medication may cause skin irritation or allergic reactions.  In the event of skin irritation, the patient was advised to reduce the amount of the drug applied or use it less frequently.   The patient verbalized understanding of the proper use and possible adverse effects of topical sulfur application.  All of the patient's questions and concerns were addressed.
Minoxidil Counseling: Minoxidil is a topical medication which can increase blood flow where it is applied. It is uncertain how this medication increases hair growth. Side effects are uncommon and include stinging and allergic reactions.
Tazorac Pregnancy And Lactation Text: This medication is not safe during pregnancy. It is unknown if this medication is excreted in breast milk.
Isotretinoin Pregnancy And Lactation Text: This medication is Pregnancy Category X and is considered extremely dangerous during pregnancy. It is unknown if it is excreted in breast milk.
Doxycycline Counseling:  Patient counseled regarding possible photosensitivity and increased risk for sunburn.  Patient instructed to avoid sunlight, if possible.  When exposed to sunlight, patients should wear protective clothing, sunglasses, and sunscreen.  The patient was instructed to call the office immediately if the following severe adverse effects occur:  hearing changes, easy bruising/bleeding, severe headache, or vision changes.  The patient verbalized understanding of the proper use and possible adverse effects of doxycycline.  All of the patient's questions and concerns were addressed.
Bimzelx Counseling:  I discussed with the patient the risks of Bimzelx including but not limited to depression, immunosuppression, allergic reactions and infections.  The patient understands that monitoring is required including a PPD at baseline and must alert us or the primary physician if symptoms of infection or other concerning signs are noted.
Cellcept Counseling:  I discussed with the patient the risks of mycophenolate mofetil including but not limited to infection/immunosuppression, GI upset, hypokalemia, hypercholesterolemia, bone marrow suppression, lymphoproliferative disorders, malignancy, GI ulceration/bleed/perforation, colitis, interstitial lung disease, kidney failure, progressive multifocal leukoencephalopathy, and birth defects.  The patient understands that monitoring is required including a baseline creatinine and regular CBC testing. In addition, patient must alert us immediately if symptoms of infection or other concerning signs are noted.
Taltz Counseling: I discussed with the patient the risks of ixekizumab including but not limited to immunosuppression, serious infections, worsening of inflammatory bowel disease and drug reactions.  The patient understands that monitoring is required including a PPD at baseline and must alert us or the primary physician if symptoms of infection or other concerning signs are noted.
Zyclara Counseling:  I discussed with the patient the risks of imiquimod including but not limited to erythema, scaling, itching, weeping, crusting, and pain.  Patient understands that the inflammatory response to imiquimod is variable from person to person and was educated regarded proper titration schedule.  If flu-like symptoms develop, patient knows to discontinue the medication and contact us.
SSKI Counseling:  I discussed with the patient the risks of SSKI including but not limited to thyroid abnormalities, metallic taste, GI upset, fever, headache, acne, arthralgias, paraesthesias, lymphadenopathy, easy bleeding, arrhythmias, and allergic reaction.
Libtayo Pregnancy And Lactation Text: This medication is contraindicated in pregnancy and when breast feeding.
Rituxan Pregnancy And Lactation Text: This medication is Pregnancy Category C and it isn't know if it is safe during pregnancy. It is unknown if this medication is excreted in breast milk but similar antibodies are known to be excreted.
Rifampin Counseling: I discussed with the patient the risks of rifampin including but not limited to liver damage, kidney damage, red-orange body fluids, nausea/vomiting and severe allergy.
Olanzapine Pregnancy And Lactation Text: This medication is pregnancy category C.   There are no adequate and well controlled trials with olanzapine in pregnant females.  Olanzapine should be used during pregnancy only if the potential benefit justifies the potential risk to the fetus.   In a study in lactating healthy women, olanzapine was excreted in breast milk.  It is recommended that women taking olanzapine should not breast feed.
Topical Sulfur Applications Pregnancy And Lactation Text: This medication is Pregnancy Category C and has an unknown safety profile during pregnancy. It is unknown if this topical medication is excreted in breast milk.
Rhofade Counseling: Rhofade is a topical medication which can decrease superficial blood flow where applied. Side effects are uncommon and include stinging, redness and allergic reactions.
Hydroxyzine Counseling: Patient advised that the medication is sedating and not to drive a car after taking this medication.  Patient informed of potential adverse effects including but not limited to dry mouth, urinary retention, and blurry vision.  The patient verbalized understanding of the proper use and possible adverse effects of hydroxyzine.  All of the patient's questions and concerns were addressed.
Low Dose Naltrexone Counseling- I discussed with the patient the potential risks and side effects of low dose naltrexone including but not limited to: more vivid dreams, headaches, nausea, vomiting, abdominal pain, fatigue, dizziness, and anxiety.
Mirvaso Pregnancy And Lactation Text: This medication has not been assigned a Pregnancy Risk Category. It is unknown if the medication is excreted in breast milk.
Carac Counseling:  I discussed with the patient the risks of Carac including but not limited to erythema, scaling, itching, weeping, crusting, and pain.
Humira Counseling:  I discussed with the patient the risks of adalimumab including but not limited to myelosuppression, immunosuppression, autoimmune hepatitis, demyelinating diseases, lymphoma, and serious infections.  The patient understands that monitoring is required including a PPD at baseline and must alert us or the primary physician if symptoms of infection or other concerning signs are noted.
Topical Clindamycin Counseling: Patient counseled that this medication may cause skin irritation or allergic reactions.  In the event of skin irritation, the patient was advised to reduce the amount of the drug applied or use it less frequently.   The patient verbalized understanding of the proper use and possible adverse effects of clindamycin.  All of the patient's questions and concerns were addressed.
Sotyktu Pregnancy And Lactation Text: There is insufficient data to evaluate whether or not Sotyktu is safe to use during pregnancy.? ?It is not known if Sotyktu passes into breast milk and whether or not it is safe to use when breastfeeding.??
Doxycycline Pregnancy And Lactation Text: This medication is Pregnancy Category D and not consider safe during pregnancy. It is also excreted in breast milk but is considered safe for shorter treatment courses.
Griseofulvin Pregnancy And Lactation Text: This medication is Pregnancy Category X and is known to cause serious birth defects. It is unknown if this medication is excreted in breast milk but breast feeding should be avoided.
Cibinqo Counseling: I discussed with the patient the risks of Cibinqo therapy including but not limited to common cold, nausea, headache, cold sores, increased blood CPK levels, dizziness, UTIs, fatigue, acne, and vomitting. Live vaccines should be avoided.  This medication has been linked to serious infections; higher rate of mortality; malignancy and lymphoproliferative disorders; major adverse cardiovascular events; thrombosis; thrombocytopenia and lymphopenia; lipid elevations; and retinal detachment.
Odomzo Counseling- I discussed with the patient the risks of Odomzo including but not limited to nausea, vomiting, diarrhea, constipation, weight loss, changes in the sense of taste, decreased appetite, muscle spasms, and hair loss.  The patient verbalized understanding of the proper use and possible adverse effects of Odomzo.  All of the patient's questions and concerns were addressed.
Sski Pregnancy And Lactation Text: This medication is Pregnancy Category D and isn't considered safe during pregnancy. It is excreted in breast milk.
Azithromycin Counseling:  I discussed with the patient the risks of azithromycin including but not limited to GI upset, allergic reaction, drug rash, diarrhea, and yeast infections.
Bimzelx Pregnancy And Lactation Text: This medication crosses the placenta and the safety is uncertain during pregnancy. It is unknown if this medication is present in breast milk.
High Dose Vitamin A Counseling: Side effects reviewed, pt to contact office should one occur.
Siliq Counseling:  I discussed with the patient the risks of Siliq including but not limited to new or worsening depression, suicidal thoughts and behavior, immunosuppression, malignancy, posterior leukoencephalopathy syndrome, and serious infections.  The patient understands that monitoring is required including a PPD at baseline and must alert us or the primary physician if symptoms of infection or other concerning signs are noted. There is also a special program designed to monitor depression which is required with Siliq.
Low Dose Naltrexone Pregnancy And Lactation Text: Naltrexone is pregnancy category C.  There have been no adequate and well-controlled studies in pregnant women.  It should be used in pregnancy only if the potential benefit justifies the potential risk to the fetus.   Limited data indicates that naltrexone is minimally excreted into breastmilk.
Wartpeel Counseling:  I discussed with the patient the risks of Wartpeel including but not limited to erythema, scaling, itching, weeping, crusting, and pain.
Hydroxyzine Pregnancy And Lactation Text: This medication is not safe during pregnancy and should not be taken. It is also excreted in breast milk and breast feeding isn't recommended.
Eucrisa Counseling: Patient may experience a mild burning sensation during topical application. Eucrisa is not approved in children less than 2 years of age.
Erythromycin Counseling:  I discussed with the patient the risks of erythromycin including but not limited to GI upset, allergic reaction, drug rash, diarrhea, increase in liver enzymes, and yeast infections.
Itraconazole Counseling:  I discussed with the patient the risks of itraconazole including but not limited to liver damage, nausea/vomiting, neuropathy, and severe allergy.  The patient understands that this medication is best absorbed when taken with acidic beverages such as non-diet cola or ginger ale.  The patient understands that monitoring is required including baseline LFTs and repeat LFTs at intervals.  The patient understands that they are to contact us or the primary physician if concerning signs are noted.
Oral Minoxidil Counseling- I discussed with the patient the risks of oral minoxidil including but not limited to shortness of breath, swelling of the feet or ankles, dizziness, lightheadedness, unwanted hair growth and allergic reaction.  The patient verbalized understanding of the proper use and possible adverse effects of oral minoxidil.  All of the patient's questions and concerns were addressed.
Opzelura Counseling:  I discussed with the patient the risks of Opzelura including but not limited to nasopharngitis, bronchitis, ear infection, eosinophila, hives, diarrhea, folliculitis, tonsillitis, and rhinorrhea.  Taken orally, this medication has been linked to serious infections; higher rate of mortality; malignancy and lymphoproliferative disorders; major adverse cardiovascular events; thrombosis; thrombocytopenia, anemia, and neutropenia; and lipid elevations.
Rifampin Pregnancy And Lactation Text: This medication is Pregnancy Category C and it isn't know if it is safe during pregnancy. It is also excreted in breast milk and should not be used if you are breast feeding.
Cyclophosphamide Counseling:  I discussed with the patient the risks of cyclophosphamide including but not limited to hair loss, hormonal abnormalities, decreased fertility, abdominal pain, diarrhea, nausea and vomiting, bone marrow suppression and infection. The patient understands that monitoring is required while taking this medication.
High Dose Vitamin A Pregnancy And Lactation Text: High dose vitamin A therapy is contraindicated during pregnancy and breast feeding.
Thalidomide Counseling: I discussed with the patient the risks of thalidomide including but not limited to birth defects, anxiety, weakness, chest pain, dizziness, cough and severe allergy.
Tremfya Counseling: I discussed with the patient the risks of guselkumab including but not limited to immunosuppression, serious infections, and drug reactions.  The patient understands that monitoring is required including a PPD at baseline and must alert us or the primary physician if symptoms of infection or other concerning signs are noted.
Cibinqo Pregnancy And Lactation Text: It is unknown if this medication will adversely affect pregnancy or breast feeding.  You should not take this medication if you are currently pregnant or planning a pregnancy or while breastfeeding.
Dapsone Counseling: I discussed with the patient the risks of dapsone including but not limited to hemolytic anemia, agranulocytosis, rashes, methemoglobinemia, kidney failure, peripheral neuropathy, headaches, GI upset, and liver toxicity.  Patients who start dapsone require monitoring including baseline LFTs and weekly CBCs for the first month, then every month thereafter.  The patient verbalized understanding of the proper use and possible adverse effects of dapsone.  All of the patient's questions and concerns were addressed.
Xeljanz Counseling: I discussed with the patient the risks of Xeljanz therapy including increased risk of infection, liver issues, headache, diarrhea, or cold symptoms. Live vaccines should be avoided. They were instructed to call if they have any problems.
Cimzia Counseling:  I discussed with the patient the risks of Cimzia including but not limited to immunosuppression, allergic reactions and infections.  The patient understands that monitoring is required including a PPD at baseline and must alert us or the primary physician if symptoms of infection or other concerning signs are noted.
Azithromycin Pregnancy And Lactation Text: This medication is considered safe during pregnancy and is also secreted in breast milk.
Solaraze Counseling:  I discussed with the patient the risks of Solaraze including but not limited to erythema, scaling, itching, weeping, crusting, and pain.
Ketoconazole Pregnancy And Lactation Text: This medication is Pregnancy Category C and it isn't know if it is safe during pregnancy. It is also excreted in breast milk and breast feeding isn't recommended.
Sarecycline Counseling: Patient advised regarding possible photosensitivity and discoloration of the teeth, skin, lips, tongue and gums.  Patient instructed to avoid sunlight, if possible.  When exposed to sunlight, patients should wear protective clothing, sunglasses, and sunscreen.  The patient was instructed to call the office immediately if the following severe adverse effects occur:  hearing changes, easy bruising/bleeding, severe headache, or vision changes.  The patient verbalized understanding of the proper use and possible adverse effects of sarecycline.  All of the patient's questions and concerns were addressed.
Niacinamide Counseling: I recommended taking niacin or niacinamide, also know as vitamin B3, twice daily. Recent evidence suggests that taking vitamin B3 (500 mg twice daily) can reduce the risk of actinic keratoses and non-melanoma skin cancers. Side effects of vitamin B3 include flushing and headache.
Oral Minoxidil Pregnancy And Lactation Text: This medication should only be used when clearly needed if you are pregnant, attempting to become pregnant or breast feeding.
Birth Control Pills Counseling: Birth Control Pill Counseling: I discussed with the patient the potential side effects of OCPs including but not limited to increased risk of stroke, heart attack, thrombophlebitis, deep venous thrombosis, hepatic adenomas, breast changes, GI upset, headaches, and depression.  The patient verbalized understanding of the proper use and possible adverse effects of OCPs. All of the patient's questions and concerns were addressed.
Hyrimoz Counseling:  I discussed with the patient the risks of adalimumab including but not limited to myelosuppression, immunosuppression, autoimmune hepatitis, demyelinating diseases, lymphoma, and serious infections.  The patient understands that monitoring is required including a PPD at baseline and must alert us or the primary physician if symptoms of infection or other concerning signs are noted.
Topical Ketoconazole Counseling: Patient counseled that this medication may cause skin irritation or allergic reactions.  In the event of skin irritation, the patient was advised to reduce the amount of the drug applied or use it less frequently.   The patient verbalized understanding of the proper use and possible adverse effects of ketoconazole.  All of the patient's questions and concerns were addressed.
Xeltesfayez Pregnancy And Lactation Text: This medication is Pregnancy Category D and is not considered safe during pregnancy.  The risk during breast feeding is also uncertain.
Cyclophosphamide Pregnancy And Lactation Text: This medication is Pregnancy Category D and it isn't considered safe during pregnancy. This medication is excreted in breast milk.
Opzelura Pregnancy And Lactation Text: There is insufficient data to evaluate drug-associated risk for major birth defects, miscarriage, or other adverse maternal or fetal outcomes.  There is a pregnancy registry that monitors pregnancy outcomes in pregnant persons exposed to the medication during pregnancy.  It is unknown if this medication is excreted in breast milk.  Do not breastfeed during treatment and for about 4 weeks after the last dose.
Albendazole Counseling:  I discussed with the patient the risks of albendazole including but not limited to cytopenia, kidney damage, nausea/vomiting and severe allergy.  The patient understands that this medication is being used in an off-label manner.
Opioid Counseling: I discussed with the patient the potential side effects of opioids including but not limited to addiction, altered mental status, and depression. I stressed avoiding alcohol, benzodiazepines, muscle relaxants and sleep aids unless specifically okayed by a physician. The patient verbalized understanding of the proper use and possible adverse effects of opioids. All of the patient's questions and concerns were addressed. They were instructed to flush the remaining pills down the toilet if they did not need them for pain.
Erythromycin Pregnancy And Lactation Text: This medication is Pregnancy Category B and is considered safe during pregnancy. It is also excreted in breast milk.
Dapsone Pregnancy And Lactation Text: This medication is Pregnancy Category C and is not considered safe during pregnancy or breast feeding.
Calcipotriene Counseling:  I discussed with the patient the risks of calcipotriene including but not limited to erythema, scaling, itching, and irritation.

## 2024-05-16 NOTE — PROCEDURE: SUTURE REMOVAL (GLOBAL PERIOD)
Detail Level: Detailed
Add 45368 Cpt? (Important Note: In 2017 The Use Of 81847 Is Being Tracked By Cms To Determine Future Global Period Reimbursement For Global Periods): no